# Patient Record
Sex: MALE | Race: WHITE | NOT HISPANIC OR LATINO | Employment: OTHER | URBAN - METROPOLITAN AREA
[De-identification: names, ages, dates, MRNs, and addresses within clinical notes are randomized per-mention and may not be internally consistent; named-entity substitution may affect disease eponyms.]

---

## 2017-08-09 ENCOUNTER — GENERIC CONVERSION - ENCOUNTER (OUTPATIENT)
Dept: OTHER | Facility: OTHER | Age: 72
End: 2017-08-09

## 2017-08-25 ENCOUNTER — ALLSCRIPTS OFFICE VISIT (OUTPATIENT)
Dept: OTHER | Facility: OTHER | Age: 72
End: 2017-08-25

## 2017-11-01 ENCOUNTER — GENERIC CONVERSION - ENCOUNTER (OUTPATIENT)
Dept: OTHER | Facility: OTHER | Age: 72
End: 2017-11-01

## 2017-11-16 ENCOUNTER — GENERIC CONVERSION - ENCOUNTER (OUTPATIENT)
Dept: OTHER | Facility: OTHER | Age: 72
End: 2017-11-16

## 2017-11-22 ENCOUNTER — ALLSCRIPTS OFFICE VISIT (OUTPATIENT)
Dept: OTHER | Facility: OTHER | Age: 72
End: 2017-11-22

## 2017-11-22 DIAGNOSIS — N28.89 OTHER SPECIFIED DISORDERS OF KIDNEY AND URETER: ICD-10-CM

## 2017-11-23 NOTE — PROGRESS NOTES
Assessment    1  Encounter for preventive health examination (V70 0) (Z00 00)   2  Hypertension (401 9) (I10)   3  Hyperlipidemia (272 4) (E78 5)   4  COPD (chronic obstructive pulmonary disease) (496) (J44 9)   5  Paroxysmal supraventricular tachycardia (427 0) (I47 1)   6  Left renal mass (593 9) (N28 89)   7  Colitis (558 9) (K52 9)    Plan  Hypertension    · Eat a low fat and low cholesterol diet ; Status:Complete;   Done: 75EKH6605 11:07AM   · We encourage you to begin to make lifestyle changes to help control your bloodpressure  These may include losing weight, increasing your activity level, limiting salt inyour diet, decreasing alcohol intake, and eating a diet low in fat and rich in fruitsand vegetables ; Status:Complete;   Done: 17FAV8046 11:07AM  Left renal mass    · US KIDNEY AND BLADDER; Status:Hold For - Scheduling; Requested for:22Nov2017;     Discussion/Summary    Finish antibiotics  He has a follow-up with Cardiology including a repeat lipid panel  schedule kidney ultrasound to evaluate lesion left kidney  Possible side effects of new medications were reviewed with the patient/guardian today  The treatment plan was reviewed with the patient/guardian  The patient/guardian understands and agrees with the treatment plan      Chief Complaint    1  Abdominal Pain    History of Present Illness  follow up visit  Patient was seen in ER 11/15/2017 with abdominal pain  workup the emergency room CBC WBC 82507  Hemoglobin 16 2  Platelet count 813990  chemistry profile random blood glucose 93  Electrolytes normal  creatinine 1 06  LFTs normal  Lactate level 1 5  lipase 130  urinalysis trace ketones  CT scan abdomen and pelvis with contrast showed suggestion of mild circumferential diffuse colonic wall thickening  Clinical correlation for infectious / inflammatory colitis suggested  Indeterminate hypodense lesion lower pole left kidney 1 3 cm  No lymphadenopathy  medications reviewed   Patient was discharged on Cipro and Flagyl  He is currently asymptomatic  Last colonoscopy 2 years ago    hypertension stable on Losartan 50 mg daily  hyperlipidemia on Simvastatin 40 mg daily  history of COPD and chronic cough  former smoker  prior pulmonary medicine including pulmonary function tests  chest x-ray and CT scan sinuses  he is currently on [employer] daily  Up-to-date with flu vaccine and pneumococcal vaccines      Review of Systems   Constitutional: recent 10 lb weight loss, but-- as noted in HPI  Eyes: no eyesight problems  ENT: no earache,-- no sore throat,-- no nasal discharge-- and-- no hoarseness  Cardiovascular: cardiomyopathy patient followed by Cardiology  08/2014 cardiac cath normal coronary arteries  11/2017 echocardiogram mildly dilated left ventricle  Mildly reduced left ventricular systolic function  Possible inferoseptal as well as inferolateral wall motion abnormalities  Estimated EF 45% right ventricle normal  No pericardial effusion  No significant valvular disease  stress echocardiogram 12/2013 negative for ischemia  Abnormal septal motion / septal hypokinesis as well as global hypokinesis  EF 40%, but-- no chest pain,-- no palpitations-- and-- no extremity edema  Respiratory: as noted in HPI,-- no orthopnea,-- no wheezing,-- no shortness of breath during exertion-- and-- no PND  Gastrointestinal: colonoscopy < 5 years ago , but-- no abdominal pain,-- no nausea,-- no vomiting,-- no constipation,-- no diarrhea-- and-- no blood in stools  Genitourinary: no dysuria,-- no urinary hesitancy,-- no incontinence-- and-- no nocturia  Musculoskeletal: no arthralgias-- and-- no myalgias  Integumentary: history of basal cell CA left cheek, but-- no rashes-- and-- no skin lesions  Neurological: no headache,-- no dizziness-- and-- no difficulty walking  Psychiatric: no anxiety-- and-- no depression  Endocrine: erectile dysfunction-- and-- ED no improvement with Viagra  , but-- no muscle weakness  Hematologic/Lymphatic: a tendency for easy bruising-- and-- on ASA, but-- no swollen glands  Active Problems  1  Colitis (558 9) (K52 9)   2  COPD (chronic obstructive pulmonary disease) (496) (J44 9)   3  Hyperlipidemia (272 4) (E78 5)   4  Hypertension (401 9) (I10)   5  Left renal mass (593 9) (N28 89)   6  Paroxysmal supraventricular tachycardia (427 0) (I47 1)    Past Medical History  1  History of basal cell carcinoma (V10 83) (Z85 828)   2  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)    Surgical History  1  History of Cervical Vertebral Fusion   2  History of Knee Surgery Left    Family History  Father    1  Family history of malignant neoplasm of stomach (V16 0) (Z80 0)  Sister    2  Family history of Colon cancer    Social History     · Former smoker (H94 70) (Y48 537)    Current Meds   1  Aspirin 81 MG TABS; Therapy: (Recorded:25Mar2015) to Recorded   2  Breo Ellipta 100-25 MCG/INH Inhalation Aerosol Powder Breath Activated; USE 1 INHALATION ONCE DAILY; Therapy: [id number] to (Last Rx:31Jan2017)  Requested for: [id number] Ordered   3  Ciprofloxacin HCl - 500 MG Oral Tablet; TAKE 1 TABLET EVERY 12 HOURS DAILY; Therapy: (Recorded:22Nov2017) to Recorded   4  Losartan Potassium 50 MG Oral Tablet; TAKE ONE (1) TABLET daily; Therapy: (Recorded:25Mar2015) to Recorded   5  Metoprolol Succinate ER 25 MG Oral Tablet Extended Release 24 Hour; One tablet daily; Therapy: (Recorded:25Mar2015) to Recorded   6  MetroNIDAZOLE 500 MG Oral Tablet; 1 po tid; Therapy: (Recorded:22Nov2017) to Recorded   7  Simvastatin 40 MG Oral Tablet; TAKE 1 TABLET BY MOUTH EVERY DAY; Therapy: (Recorded:25Mar2015) to Recorded    Allergies  1  No Known Drug Allergies  2  No Known Environmental Allergies   3   No Known Food Allergies    Vitals  Vital Signs    Recorded: 22Nov2017 08:18AM   Temperature 96 1 F   Heart Rate 78   Respiration 18   Systolic 711   Diastolic 70   BP CUFF SIZE Large   Height 5 ft 7 in   Weight 180 lb 9 6 oz   BMI Calculated 28 29   BSA Calculated 1 94       Physical Exam   Constitutional  General appearance: No acute distress, well appearing and well nourished  Head and Face  Palpation of the face and sinuses: No sinus tenderness  Eyes  Conjunctiva and lids: No erythema, swelling or discharge  -- fundi not seen  Pupils and irises: Equal, round, reactive to light  Ears, Nose, Mouth, and Throat  Otoscopic examination: Tympanic membranes translucent with normal light reflex  Canals patent without erythema  Oropharynx: Normal with no erythema, edema, exudate or lesions  Neck  Neck: Supple, symmetric, trachea midline, no masses  -- no JVD  Thyroid: Normal, no thyromegaly  Pulmonary  Auscultation of lungs: Clear to auscultation  no rales or crackles were heard bilaterally  no wheezing  no diminished breath sounds  Cardiovascular  Auscultation of heart: Normal rate and rhythm, normal S1 and S2, no murmurs  Heart sounds: no gallop heard  Carotid pulses: 2+ bilaterally  no bruit heard over the right carotid-- and-- no bruit heard over the left carotid  Abdominal aorta: Normal   Abdominal aorta: no bruit heard  Examination of extremities for edema and/or varicosities: Normal    Abdomen  Abdomen: Non-tender, no masses  Liver and spleen: No hepatomegaly or splenomegaly  Lymphatic  Palpation of lymph nodes in neck: No lymphadenopathy  no anterior cervical node enlargement,-- no posterior cervical node enlargement,-- no submandibular node enlargement-- and-- no supraclavicular node enlargement  Musculoskeletal  Gait and station: Normal    Inspection/palpation of digits and nails: Normal without clubbing or cyanosis  Range of motion: Normal    Skin  Skin and subcutaneous tissue: Normal without rashes or lesions  Neurologic  Cranial nerves: Cranial nerves 2-12 intact  Psychiatric  Recent and remote memory: Intact     Mood and affect: Normal        Results/Data  ECHO COMPLETE WITH CONTRAST IF INDICATED [id number] 12:00AM      Test Name Result Flag Reference   ECHO COMPLETE WITH CONTRAST IF INDICATED 11/1/2017         Summary / No summary entered :    No summary entered  Documents attached :    Srini Found; Enc: [id number] - Image Encounter - Corky Tovar - (Family    Medicine) (Additional Information Document)  (1) TISSUE EXAM 29Apr2016 11:49AM Bradly Patience     Test Name Result Flag Reference   LAB AP CASE REPORT (Report)       Surgical Pathology Report             Case: [id number]                  Authorizing Provider: Daija Reed MD       Collected:      04/29/2016 1149       Ordering Location:   [facility]    Received:      04/29/2016 Ahmet Lopez [address] Operating Room                           Pathologist:      Ray Patel MD                               Intraop:        Ray Patel MD                               Specimens:  A) - Skin, Other, Left Cheek BCC- Marking suture short superior, long lateral            B) - Skin, Cyst/Tag/Debridement, Left lower lid lesion                        C) - Skin, Other, Right Upper Lid Lesion                               D) - Skin, Other, Right Lower Lid Lesion                               E) - Skin, Other, Right Lateral Lower Lid Lesion                           F) - Skin, Other, New inferior margin- Left Cheek BCC   LAB AP FINAL DIAGNOSIS (Report)       A  Skin, left Cheek BCC- Marking suture short superior, long lateral,  excision: - Basal cell carcinoma (1 1 cm), nodular and infiltrative types, extending  to deep reticular dermis  - No perineural or lymph-vascular invasion identified  - Resection margins (peripheral and deep) are negative for malignancy  - Changes consistent with prior biopsy  B  Skin, left lower lid lesion, shave biopsy: - Acrochordon/skin tag  C  Skin, right upper lid lesion, shave biopsy: - Seborrheic keratosis, pigmented     D  Skin, right lower lid lesion, shave biopsy: - Seborrheic keratosis, pigmented, reticulated and inflamed  E  Skin, right lateral lower lid lesion, shave biopsy: - Unremarkable benign dermis with adnexa and skeletal muscle; no invasive  carcinoma identified  - No epidermis present; multiple levels examined  See Note  F  Skin, new inferior margin- left cheek BCC, re-excision: - No residual basal cell carcinoma; negative for malignancy  -- No perineural or lymph-vascular invasion  Interpretation performed at [facility], [address] [place] [address]  [id number]  LAB AP INTRAOPERATIVE CONSULTATION        FSDXA: Residual basal cell carcinoma  Visualized peripheral and deep  margins negative  Detached tumor fragments at inferior deep margin  Dr Seb Felix notified 4/29/16   LAB AP NOTE (Report)       Initial levels of specimen E revealed only dermis and skeletal muscle with  no epidermis  Despite re-embedding of the tissue and additional levels, no  epidermis is present to evaluate for an epidermal neoplasm  No evidence of  invasive carcinoma is identified  If lesion remains or recurs, additional  biopsy may be considered  A copy of the final report is faxed to Dr Seb Felix  on 5/4/16   LAB AP SURGICAL ADDITIONAL INFORMATION (Report)       These tests were developed and their performance characteristics  determined by [facility] or [facility]  They may not be cleared or approved by the [place]  Food and  Drug Administration  The FDA has determined that such clearance or  approval is not necessary  These tests are used for clinical purposes  They should not be regarded as investigational or for research  This  laboratory has been approved by CLIA 88, designated as a high-complexity  laboratory and is qualified to perform these tests  LAB AP GROSS DESCRIPTION (Report)       A   The specimen is received fresh for frozen section, labeled with the  patient's name and hospital number, and is designated left cheek  BCC-marking suture short superior, long lateral  The specimen consists of  a 2 x 1 6 x 0 5 cm round excision of tan skin with a 1 0 x 0 7 cm pearly  white nodule  A short suture designating superior will be denoted 1200 and  a long suture designating lateral will be denoted 3 o'clock, are present  The specimen is inked as follows: 12-3 o'clock yellow; 3-6 o'clock green;  6-9 o'clock orange; 9-12 o'clock blue  The specimen is sectioned from 12  to 6 o'clock and entirely submitted for frozen section and subsequent  permanents in the following four blocks/cassettes: A1: 12 and 6 o'clock tips, on end, ink side cut first; A2-A4- center of specimen from 12 to 6 o'clock, 2 pieces in A2, A3; one  piece in A4  EMM  B  The specimen is received in formalin, labeled with the patient's name  and hospital number, and is designated left lower lid lesion  The  specimen consists of one tan partially wrinkled, non-hairbearing, skin  papule, which measures 0 3 x 0 2 x 0 1 cm  The margin of resection is  inked blue, and the surface inked red  Entirely submitted  One cassette  C  The specimen is received in formalin, labeled with the patient's name  and hospital number, and is designated right upper lid lesion  The  specimen consists of one tan, wrinkled, non-hairbearing, skin papule  measuring 0 3 x 0 2 x 0 2 centimeters  The margin of resection is inked  blue  Entirely submitted  One cassette  D  The specimen is received in formalin, labeled with the patient's name  and hospital number, and is designated right lower lid lesion  The  specimen consists of one tan, non-hairbearing, portion of skin measuring  0 7 x 0 4 x 0 2 centimeters  The surface exhibits a keratotic, papule  which measures 0 4 by 0 4 by 0 1 centimeters and appears to come within  less than 0 1 centimeters of nearest peripheral margin  The margin of  resection is inked blue, and the surface tips are inked red  Entirely  submitted  One cassette, trisected, and sequentially submitted    E  The specimen is received in formalin, labeled with the patient's name  and hospital number, and is designated right lateral lower lid lesion  The specimen consists of one partially ragged, tan, non-hairbearing,  portion of skin measuring 0 3 x 0 2 x 0 2 centimeters  The surface appears  partially keratotic  The apparent margin of resection is inked blue, and  the apparent surface inked red  Entirely submitted  One cassette  F  The specimen is received in formalin, labeled with the patient's name  and hospital number, and is designated new inferior margin, left cheek  BCC  The specimen consists of one somewhat arrow shaped, nonhairbearing,  portion of skin measuring 0 8 x 0 5 centimeters with attached underlying  soft tissue to a depth of 0 5 centimeters  One end of the specimen appears  partially ragged, and concave, consistent with old margin, and is inked  black  The remaining specimen is inked as follows: 3-6:00 green, 6-9:00  orange, and the 6 o'clock surface tips is inked red  The specimen is  trisected revealing white-tan dense cut surfaces  Entirely submitted  Two  cassettes  1: 6 o'clock end of resection 2: Central portion bisected  Note: The estimated total formalin fixation time based upon information  provided by the submitting clinician and the standard processing schedule  is approximately 72 hours  MAS     (1) CBC/PLT/DIFF [id number] 12:06PM Jessie Baker     Test Name Result Flag Reference   WBC COUNT 10 9         Summary / No summary entered :    No summary entered  Documents attached :    [address] Work - Jessie Olivia;  Enc: [id number] - Image Encounter - Jessie Baker -    (Family Medicine) (Additional Information Document)  Signatures   Electronically signed by : CHRIS Almanza ; Nov 22 2017 11:07AM EST                       (Author)

## 2018-01-13 VITALS
BODY MASS INDEX: 28.35 KG/M2 | HEIGHT: 67 IN | TEMPERATURE: 96.1 F | WEIGHT: 180.6 LBS | HEART RATE: 78 BPM | RESPIRATION RATE: 18 BRPM | DIASTOLIC BLOOD PRESSURE: 70 MMHG | SYSTOLIC BLOOD PRESSURE: 120 MMHG

## 2018-01-13 VITALS — HEIGHT: 67 IN | WEIGHT: 190 LBS | BODY MASS INDEX: 29.82 KG/M2

## 2018-01-18 NOTE — PROGRESS NOTES
Assessment    1  Encounter for preventive health examination (V70 0) (Z00 00)   2  Hypertension (401 9) (I10)   3  Hyperlipidemia (272 4) (E78 5)   4  COPD (chronic obstructive pulmonary disease) (496) (J44 9)   5  Paroxysmal supraventricular tachycardia (427 0) (I47 1)   6  Left renal mass (593 9) (N28 89)   7  Colitis (558 9) (K52 9)    Plan  Left renal mass    · US KIDNEY [facility] BLADDER; Status:Hold For - Scheduling; Requested for:22Nov2017;     Discussion/Summary    Finish antibiotics  He has a follow-up with Cardiology including a repeat lipid panel  schedule kidney ultrasound to evaluate lesion left kidney  Impression: Subsequent Annual Wellness Visit, with preventive exam as well as age and risk appropriate counseling completed  Cardiovascular screening and counseling: screening not indicated, counseling was given on maintaining a healthy diet and counseling was given on maintaining a healthy weight  Diabetes screening and counseling: screening is current  Colorectal cancer screening and counseling: screening is current  Prostate cancer screening and counseling: screening is current and 02/2016 PSA 1 39  Osteoporosis screening and counseling: screening not indicated  Abdominal aortic aneurysm screening and counseling: screening is current  Immunizations: the patient declines the influenza vaccination, influenza vaccine is up to date this year, the patient declines the pneumococcal vaccination, the lifetime pneumococcal vaccine has been completed and the patient declines the Zostavax vaccine  Advice and education were given regarding fall risk reduction, increasing physical activity, nutrition (non-diabetic), skin self-exam, sunscreen use and weight loss  He was referred to cardiology  Patient Discussion: follow-up visit needed in one year  Possible side effects of new medications were reviewed with the patient/guardian today  The treatment plan was reviewed with the patient/guardian  The patient/guardian understands and agrees with the treatment plan      Chief Complaint    1  Abdominal Pain    History of Present Illness  HPI: follow up visit  Patient was seen in ER 11/15/2017 with abdominal pain  workup the emergency room CBC WBC 58130  Hemoglobin 16 2  Platelet count 207881  chemistry profile random blood glucose 93  Electrolytes normal  creatinine 1 06  LFTs normal  Lactate level 1 5  lipase 130  urinalysis trace ketones  CT scan abdomen and pelvis with contrast showed suggestion of mild circumferential diffuse colonic wall thickening  Clinical correlation for infectious / inflammatory colitis suggested  Indeterminate hypodense lesion lower pole left kidney 1 3 cm  No lymphadenopathy  medications reviewed  Patient was discharged on Cipro and Flagyl  He is currently asymptomatic  Last colonoscopy 2 years ago    hypertension stable on Losartan 50 mg daily  hyperlipidemia on Simvastatin 40 mg daily  history of COPD and chronic cough  former smoker  prior pulmonary medicine including pulmonary function tests  chest x-ray and CT scan sinuses  he is currently on [employer] daily  Up-to-date with flu vaccine and pneumococcal vaccines       Welcome to Medicare and Wellness Visits: The patient is being seen for the subsequent annual wellness visit  Medicare Screening and Risk Factors   Hospitalizations: he has been previously hospitalizied  Once per lifetime medicare screening tests: ECG (12/2015) and AAA screening US (04/2013 no AAA  )  Medicare Screening Tests Risk Questions   Abdominal aortic aneurysm risk assessment: tobacco use and over 72years of age  Osteoporosis risk assessment:  and over 48years of age  Drug and Alcohol Use: The patient is a former cigarette smoker  The patient reports rare alcohol use  Diet and Physical Activity: Current diet includes well balanced meals  He exercises infrequently  Exercise: walking     Mood Disorder and Cognitive Impairment Screening: He denies feeling down, depressed, or hopeless over the past two weeks  He denies feeling little interest or pleasure in doing things over the past two weeks  Cognitive impairment screening: denies difficulty learning/retaining new information, denies difficulty handling complex tasks, denies difficulty with reasoning, denies difficulty with spatial ability and orientation, denies difficulty with language and denies difficulty with behavior  Functional Ability/Level of Safety: He denies hearing difficulties  He does not use a hearing aid  The patient is currently able to participate in social activities with limitations and able to drive with limitations, but able to do activities of daily living without limitations and able to do instrumental activities of daily living without limitations  Fall risk factors: The patient fell 0 times in the past 12 months  Injury History: no polypharmacy, no mobility impairment, no antidepressant use, no deconditioning, no postural hypotension, no sedative use, no visual impairment, no urinary incontinence, antihypertensive use, no cognitive impairment and no previous fall  Advance Directives: Advance directives: no living will, no durable power of  for health care directives and no advance directives  Co-Managers and Medical Equipment/Suppliers: See Patient Care Team      Patient Care Team    Care Team Member Role Specialty Office Number   Chantale Porter MD  Cardiology [phone]   Felicita HODGE  Specialist Plastic Surgery [phone]     Review of Systems    Constitutional: recent 10 lb weight loss, but as noted in HPI  Eyes: no eyesight problems  ENT: no earache, no sore throat, no nasal discharge and no hoarseness  Cardiovascular: cardiomyopathy patient followed by Cardiology  08/2014 cardiac cath normal coronary arteries  11/2017 echocardiogram mildly dilated left ventricle  Mildly reduced left ventricular systolic function   Possible inferoseptal as well as inferolateral wall motion abnormalities  Estimated EF 45% right ventricle normal  No pericardial effusion  No significant valvular disease  stress echocardiogram 12/2013 negative for ischemia  Abnormal septal motion / septal hypokinesis as well as global hypokinesis  EF 40%, but no chest pain, no palpitations and no extremity edema  Respiratory: as noted in HPI, no orthopnea, no wheezing, no shortness of breath during exertion and no PND  Gastrointestinal: colonoscopy < 5 years ago , but no abdominal pain, no nausea, no vomiting, no constipation, no diarrhea and no blood in stools  Genitourinary: no dysuria, no urinary hesitancy, no incontinence and no nocturia  Musculoskeletal: no arthralgias and no myalgias  Integumentary: history of basal cell CA left cheek, but no rashes and no skin lesions  Neurological: no headache, no dizziness and no difficulty walking  Psychiatric: no anxiety and no depression  Endocrine: erectile dysfunction and ED no improvement with Viagra  , but no muscle weakness  Hematologic/Lymphatic: a tendency for easy bruising and on ASA, but no swollen glands  Active Problems    1  COPD (chronic obstructive pulmonary disease) (496) (J44 9)   2  Hyperlipidemia (272 4) (E78 5)   3  Hypertension (401 9) (I10)   4  Paroxysmal supraventricular tachycardia (427 0) (I47 1)    Past Medical History    · History of basal cell carcinoma (V10 83) (G66 089)   · 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) (U70 47RF)    Surgical History    · History of Cervical Vertebral Fusion   · History of Knee Surgery Left    Family History  Father    · Family history of malignant neoplasm of stomach (V16 0) (Z80 0)  Sister    · Family history of Colon cancer    The family history was reviewed and updated today  Social History    · Former smoker (Z23 48) (E86 336)    Current Meds   1  Aspirin 81 MG TABS; Therapy: (Recorded:25Mar2015) to Recorded   2   Breo Ellipta 100-25 MCG/INH Inhalation Aerosol Powder Breath Activated; USE 1   INHALATION ONCE DAILY; Therapy: [id number] to (Last Rx:31Jan2017)  Requested for: [id number] Ordered   3  Ciprofloxacin HCl - 500 MG Oral Tablet; TAKE 1 TABLET EVERY 12 HOURS DAILY; Therapy: (Recorded:22Nov2017) to Recorded   4  Losartan Potassium 50 MG Oral Tablet; TAKE ONE (1) TABLET daily; Therapy: (Recorded:25Mar2015) to Recorded   5  Metoprolol Succinate ER 25 MG Oral Tablet Extended Release 24 Hour; One tablet daily; Therapy: (Recorded:25Mar2015) to Recorded   6  MetroNIDAZOLE 500 MG Oral Tablet; 1 po tid; Therapy: (Recorded:22Nov2017) to Recorded   7  Simvastatin 40 MG Oral Tablet; TAKE 1 TABLET BY MOUTH EVERY DAY; Therapy: (Recorded:25Mar2015) to Recorded    Allergies    1  No Known Drug Allergies    2  No Known Environmental Allergies   3  No Known Food Allergies    Vitals  Signs    Temperature: 96 1 F  Heart Rate: 78  Respiration: 18  Systolic: 799  Diastolic: 70  BP Cuff Size: Large  Height: 5 ft 7 in  Weight: 180 lb 9 6 oz  BMI Calculated: 28 29  BSA Calculated: 1 94    Physical Exam    Constitutional   General appearance: No acute distress, well appearing and well nourished  Head and Face   Palpation of the face and sinuses: No sinus tenderness  Eyes   Conjunctiva and lids: No erythema, swelling or discharge  fundi not seen  Pupils and irises: Equal, round, reactive to light  Ears, Nose, Mouth, and Throat   Otoscopic examination: Tympanic membranes translucent with normal light reflex  Canals patent without erythema  Oropharynx: Normal with no erythema, edema, exudate or lesions  Neck   Neck: Supple, symmetric, trachea midline, no masses  no JVD  Thyroid: Normal, no thyromegaly  Pulmonary   Auscultation of lungs: Clear to auscultation  no rales or crackles were heard bilaterally  no wheezing  no diminished breath sounds     Cardiovascular   Auscultation of heart: Normal rate and rhythm, normal S1 and S2, no murmurs  Heart sounds: no gallop heard  Carotid pulses: 2+ bilaterally  no bruit heard over the right carotid and no bruit heard over the left carotid  Abdominal aorta: Normal   Abdominal aorta: no bruit heard  Examination of extremities for edema and/or varicosities: Normal     Abdomen   Abdomen: Non-tender, no masses  Liver and spleen: No hepatomegaly or splenomegaly  Lymphatic   Palpation of lymph nodes in neck: No lymphadenopathy  no anterior cervical node enlargement, no posterior cervical node enlargement, no submandibular node enlargement and no supraclavicular node enlargement  Musculoskeletal   Gait and station: Normal     Inspection/palpation of digits and nails: Normal without clubbing or cyanosis  Range of motion: Normal     Skin   Skin and subcutaneous tissue: Normal without rashes or lesions  Neurologic   Cranial nerves: Cranial nerves 2-12 intact  Psychiatric   Recent and remote memory: Intact  Mood and affect: Normal        Results/Data  ECHO COMPLETE WITH CONTRAST IF INDICATED [id number] 12:00AM      Test Name Result Flag Reference   ECHO COMPLETE WITH CONTRAST IF INDICATED 11/1/2017       Summary / No summary entered :      No summary entered  Documents attached :      [place];  Enc: [id number] - Image Encounter - Atulpatricio ManzoLidya - (Family      Medicine) (Additional Information Document)  (1) TISSUE EXAM 29Apr2016 11:49AM Dori Garcia     Test Name Result Flag Reference   LAB AP CASE REPORT (Report)     Surgical Pathology Report             Case: [id number]                   Authorizing Provider: Paris Vieira MD       Collected:      04/29/2016 1149        Ordering Location:   [facility]    Received:      04/29/2016 [address] Room                            Pathologist:      Emma Zamora MD                                Intraop:        Emma Zamora MD                                Specimens:  A) - Skin, Other, Left Cheek BCC- Marking suture short superior, long lateral             B) - Skin, Cyst/Tag/Debridement, Left lower lid lesion                         C) - Skin, Other, Right Upper Lid Lesion                                D) - Skin, Other, Right Lower Lid Lesion                                E) - Skin, Other, Right Lateral Lower Lid Lesion                            F) - Skin, Other, New inferior margin- Left Cheek BCC   LAB AP FINAL DIAGNOSIS (Report)     A  Skin, left Cheek BCC- Marking suture short superior, long lateral,   excision:  - Basal cell carcinoma (1 1 cm), nodular and infiltrative types, extending   to deep reticular dermis   - No perineural or lymph-vascular invasion identified   - Resection margins (peripheral and deep) are negative for malignancy  - Changes consistent with prior biopsy  B  Skin, left lower lid lesion, shave biopsy:  - Acrochordon/skin tag  C  Skin, right upper lid lesion, shave biopsy:  - Seborrheic keratosis, pigmented  D  Skin, right lower lid lesion, shave biopsy:  - Seborrheic keratosis, pigmented, reticulated and inflamed  E  Skin, right lateral lower lid lesion, shave biopsy:  - Unremarkable benign dermis with adnexa and skeletal muscle; no invasive   carcinoma identified   - No epidermis present; multiple levels examined  See Note  F  Skin, new inferior margin- left cheek BCC, re-excision:  - No residual basal cell carcinoma; negative for malignancy  -- No perineural or lymph-vascular invasion  Interpretation performed at [facility], [address] [place] [address]  LAB AP INTRAOPERATIVE CONSULTATION      FSDXA: Residual basal cell carcinoma  Visualized peripheral and deep   margins negative  Detached tumor fragments at inferior deep margin  Dr René Chaves notified 4/29/16   LAB AP NOTE (Report)     Initial levels of specimen E revealed only dermis and skeletal muscle with   no epidermis   Despite re-embedding of the tissue and additional levels, no   epidermis is present to evaluate for an epidermal neoplasm  No evidence of   invasive carcinoma is identified  If lesion remains or recurs, additional   biopsy may be considered  A copy of the final report is faxed to Dr Thornell Brunner   on 5/4/16   LAB AP SURGICAL ADDITIONAL INFORMATION (Report)     These tests were developed and their performance characteristics   determined by [facility] or [facility]  They may not be cleared or approved by the [place]  Food and   Drug Administration  The FDA has determined that such clearance or   approval is not necessary  These tests are used for clinical purposes  They should not be regarded as investigational or for research  This   laboratory has been approved by [place] [id number], designated as a high-complexity   laboratory and is qualified to perform these tests  LAB AP GROSS DESCRIPTION (Report)     A  The specimen is received fresh for frozen section, labeled with the   patient's name and hospital number, and is designated left cheek   BCC-marking suture short superior, long lateral  The specimen consists of   a 2 x 1 6 x 0 5 cm round excision of tan skin with a 1 0 x 0 7 cm pearly   white nodule  A short suture designating superior will be denoted 1200 and   a long suture designating lateral will be denoted 3 o'clock, are present  The specimen is inked as follows: 12-3 o'clock yellow; 3-6 o'clock green;   6-9 o'clock orange; 9-12 o'clock blue  The specimen is sectioned from 12   to 6 o'clock and entirely submitted for frozen section and subsequent   permanents in the following four blocks/cassettes:  A1: 12 and 6 o'clock tips, on end, ink side cut first;  A2-A4- center of specimen from 12 to 6 o'clock, 2 pieces in A2, A3; one   piece in A4  EMM    B  The specimen is received in formalin, labeled with the patient's name   and hospital number, and is designated left lower lid lesion   The   specimen consists of one tan partially wrinkled, non-hairbearing, skin   papule, which measures 0 3 x 0 2 x 0 1 cm  The margin of resection is   inked blue, and the surface inked red  Entirely submitted  One cassette  C  The specimen is received in formalin, labeled with the patient's name   and hospital number, and is designated right upper lid lesion  The   specimen consists of one tan, wrinkled, non-hairbearing, skin papule   measuring 0 3 x 0 2 x 0 2 centimeters  The margin of resection is inked   blue  Entirely submitted  One cassette  D  The specimen is received in formalin, labeled with the patient's name   and hospital number, and is designated right lower lid lesion  The   specimen consists of one tan, non-hairbearing, portion of skin measuring   0 7 x 0 4 x 0 2 centimeters  The surface exhibits a keratotic, papule   which measures 0 4 by 0 4 by 0 1 centimeters and appears to come within   less than 0 1 centimeters of nearest peripheral margin  The margin of   resection is inked blue, and the surface tips are inked red  Entirely   submitted  One cassette, trisected, and sequentially submitted  E  The specimen is received in formalin, labeled with the patient's name   and hospital number, and is designated right lateral lower lid lesion  The specimen consists of one partially ragged, tan, non-hairbearing,   portion of skin measuring 0 3 x 0 2 x 0 2 centimeters  The surface appears   partially keratotic  The apparent margin of resection is inked blue, and   the apparent surface inked red  Entirely submitted  One cassette  F  The specimen is received in formalin, labeled with the patient's name   and hospital number, and is designated new inferior margin, left cheek   BCC  The specimen consists of one somewhat arrow shaped, nonhairbearing,   portion of skin measuring 0 8 x 0 5 centimeters with attached underlying   soft tissue to a depth of 0 5 centimeters   One end of the specimen appears   partially ragged, and concave, consistent with old margin, and is inked   black  The remaining specimen is inked as follows: 3-6:00 green, 6-9:00   orange, and the 6 o'clock surface tips is inked red  The specimen is   trisected revealing white-tan dense cut surfaces  Entirely submitted  Two   cassettes  1: 6 o'clock end of resection  2: Central portion bisected    Note: The estimated total formalin fixation time based upon information   provided by the submitting clinician and the standard processing schedule   is approximately 72 hours  MAS     (1) CBC/PLT/DIFF [id number] 12:06PM Mraco Cash     Test Name Result Flag Reference   WBC COUNT 10 9       Summary / No summary entered :      No summary entered  Documents attached :      [address] Work - Marco Cash;  Enc: [id number] - Image Encounter - Marco Cash -      (Family Medicine) (Additional Information Document)  Signatures   Electronically signed by : CHRIS Payan ; Nov 22 2017 11:05AM EST                       (Author)

## 2018-01-26 DIAGNOSIS — J44.9 CHRONIC OBSTRUCTIVE PULMONARY DISEASE, UNSPECIFIED COPD TYPE (HCC): Primary | ICD-10-CM

## 2018-01-26 RX ORDER — FLUTICASONE FUROATE AND VILANTEROL TRIFENATATE 100; 25 UG/1; UG/1
POWDER RESPIRATORY (INHALATION)
Qty: 180 EACH | Refills: 3 | Status: SHIPPED | OUTPATIENT
Start: 2018-01-26 | End: 2018-09-17 | Stop reason: SDUPTHER

## 2018-09-17 DIAGNOSIS — J44.9 CHRONIC OBSTRUCTIVE PULMONARY DISEASE, UNSPECIFIED COPD TYPE (HCC): ICD-10-CM

## 2018-09-18 RX ORDER — FLUTICASONE FUROATE AND VILANTEROL 100; 25 UG/1; UG/1
1 POWDER RESPIRATORY (INHALATION) DAILY
Qty: 90 EACH | Refills: 1 | Status: SHIPPED | OUTPATIENT
Start: 2018-09-18 | End: 2019-04-14 | Stop reason: SDUPTHER

## 2019-04-14 DIAGNOSIS — J44.9 CHRONIC OBSTRUCTIVE PULMONARY DISEASE, UNSPECIFIED COPD TYPE (HCC): ICD-10-CM

## 2019-04-15 RX ORDER — FLUTICASONE FUROATE AND VILANTEROL TRIFENATATE 100; 25 UG/1; UG/1
POWDER RESPIRATORY (INHALATION)
Qty: 180 EACH | Refills: 0 | Status: SHIPPED | OUTPATIENT
Start: 2019-04-15 | End: 2019-06-25 | Stop reason: SDUPTHER

## 2019-04-24 ENCOUNTER — OFFICE VISIT (OUTPATIENT)
Dept: FAMILY MEDICINE CLINIC | Facility: CLINIC | Age: 74
End: 2019-04-24
Payer: COMMERCIAL

## 2019-04-24 ENCOUNTER — TELEPHONE (OUTPATIENT)
Dept: FAMILY MEDICINE CLINIC | Facility: CLINIC | Age: 74
End: 2019-04-24

## 2019-04-24 VITALS
BODY MASS INDEX: 26.98 KG/M2 | DIASTOLIC BLOOD PRESSURE: 68 MMHG | OXYGEN SATURATION: 97 % | SYSTOLIC BLOOD PRESSURE: 102 MMHG | HEIGHT: 68 IN | RESPIRATION RATE: 16 BRPM | HEART RATE: 83 BPM | WEIGHT: 178 LBS | TEMPERATURE: 96.8 F

## 2019-04-24 DIAGNOSIS — I10 ESSENTIAL HYPERTENSION: ICD-10-CM

## 2019-04-24 DIAGNOSIS — L81.9 ATYPICAL PIGMENTED LESION: Primary | ICD-10-CM

## 2019-04-24 DIAGNOSIS — E11.65 TYPE 2 DIABETES MELLITUS WITH HYPERGLYCEMIA, WITHOUT LONG-TERM CURRENT USE OF INSULIN (HCC): ICD-10-CM

## 2019-04-24 DIAGNOSIS — E78.2 MIXED HYPERLIPIDEMIA: ICD-10-CM

## 2019-04-24 DIAGNOSIS — I47.1 PAROXYSMAL SUPRAVENTRICULAR TACHYCARDIA (HCC): ICD-10-CM

## 2019-04-24 DIAGNOSIS — J44.9 CHRONIC OBSTRUCTIVE PULMONARY DISEASE, UNSPECIFIED COPD TYPE (HCC): ICD-10-CM

## 2019-04-24 DIAGNOSIS — I50.22 CHF (CONGESTIVE HEART FAILURE), NYHA CLASS I, CHRONIC, SYSTOLIC (HCC): ICD-10-CM

## 2019-04-24 PROBLEM — K52.9 COLITIS: Status: ACTIVE | Noted: 2017-11-22

## 2019-04-24 PROBLEM — K52.9 COLITIS: Status: RESOLVED | Noted: 2017-11-22 | Resolved: 2019-04-24

## 2019-04-24 PROBLEM — N28.89 LEFT RENAL MASS: Status: RESOLVED | Noted: 2017-11-22 | Resolved: 2019-04-24

## 2019-04-24 PROBLEM — N28.89 LEFT RENAL MASS: Status: ACTIVE | Noted: 2017-11-22

## 2019-04-24 LAB — SL AMB POCT HEMOGLOBIN AIC: 10.1 (ref ?–6.5)

## 2019-04-24 PROCEDURE — 1111F DSCHRG MED/CURRENT MED MERGE: CPT | Performed by: FAMILY MEDICINE

## 2019-04-24 PROCEDURE — 11104 PUNCH BX SKIN SINGLE LESION: CPT | Performed by: FAMILY MEDICINE

## 2019-04-24 PROCEDURE — 83036 HEMOGLOBIN GLYCOSYLATED A1C: CPT | Performed by: FAMILY MEDICINE

## 2019-04-29 ENCOUNTER — TELEPHONE (OUTPATIENT)
Dept: FAMILY MEDICINE CLINIC | Facility: CLINIC | Age: 74
End: 2019-04-29

## 2019-04-29 LAB
CLINICAL INFO: NORMAL
PATH REPORT.COMMENTS IMP SPEC: NORMAL
PATH REPORT.FINAL DX SPEC: NORMAL
PROCEDURE TYPE: NORMAL
SPECIMEN SOURCE: NORMAL

## 2019-05-06 PROCEDURE — 88305 TISSUE EXAM BY PATHOLOGIST: CPT | Performed by: PATHOLOGY

## 2019-05-07 PROBLEM — L98.9 BACK SKIN LESION: Status: ACTIVE | Noted: 2019-05-07

## 2019-05-20 ENCOUNTER — OFFICE VISIT (OUTPATIENT)
Dept: LAB | Age: 74
End: 2019-05-20
Payer: COMMERCIAL

## 2019-05-20 ENCOUNTER — APPOINTMENT (OUTPATIENT)
Dept: LAB | Age: 74
End: 2019-05-20
Payer: COMMERCIAL

## 2019-05-20 DIAGNOSIS — IMO0002 SQUAMOUS CELL CARCINOMA: ICD-10-CM

## 2019-05-20 LAB
ALBUMIN SERPL BCP-MCNC: 4.1 G/DL (ref 3.5–5)
ALP SERPL-CCNC: 102 U/L (ref 46–116)
ALT SERPL W P-5'-P-CCNC: 33 U/L (ref 12–78)
ANION GAP SERPL CALCULATED.3IONS-SCNC: 8 MMOL/L (ref 4–13)
AST SERPL W P-5'-P-CCNC: 23 U/L (ref 5–45)
BASOPHILS # BLD AUTO: 0.04 THOUSANDS/ΜL (ref 0–0.1)
BASOPHILS NFR BLD AUTO: 0 % (ref 0–1)
BILIRUB SERPL-MCNC: 0.43 MG/DL (ref 0.2–1)
BUN SERPL-MCNC: 24 MG/DL (ref 5–25)
CALCIUM SERPL-MCNC: 9.3 MG/DL (ref 8.3–10.1)
CHLORIDE SERPL-SCNC: 107 MMOL/L (ref 100–108)
CO2 SERPL-SCNC: 25 MMOL/L (ref 21–32)
CREAT SERPL-MCNC: 1.17 MG/DL (ref 0.6–1.3)
CREAT UR-MCNC: 241 MG/DL
EOSINOPHIL # BLD AUTO: 0.05 THOUSAND/ΜL (ref 0–0.61)
EOSINOPHIL NFR BLD AUTO: 1 % (ref 0–6)
ERYTHROCYTE [DISTWIDTH] IN BLOOD BY AUTOMATED COUNT: 12.8 % (ref 11.6–15.1)
EST. AVERAGE GLUCOSE BLD GHB EST-MCNC: 197 MG/DL
GFR SERPL CREATININE-BSD FRML MDRD: 62 ML/MIN/1.73SQ M
GLUCOSE P FAST SERPL-MCNC: 84 MG/DL (ref 65–99)
HBA1C MFR BLD: 8.5 % (ref 4.2–6.3)
HCT VFR BLD AUTO: 45.4 % (ref 36.5–49.3)
HGB BLD-MCNC: 14.9 G/DL (ref 12–17)
IMM GRANULOCYTES # BLD AUTO: 0.04 THOUSAND/UL (ref 0–0.2)
IMM GRANULOCYTES NFR BLD AUTO: 0 % (ref 0–2)
LYMPHOCYTES # BLD AUTO: 2.74 THOUSANDS/ΜL (ref 0.6–4.47)
LYMPHOCYTES NFR BLD AUTO: 29 % (ref 14–44)
MCH RBC QN AUTO: 29.6 PG (ref 26.8–34.3)
MCHC RBC AUTO-ENTMCNC: 32.8 G/DL (ref 31.4–37.4)
MCV RBC AUTO: 90 FL (ref 82–98)
MICROALBUMIN UR-MCNC: 22.2 MG/L (ref 0–20)
MICROALBUMIN/CREAT 24H UR: 9 MG/G CREATININE (ref 0–30)
MONOCYTES # BLD AUTO: 0.81 THOUSAND/ΜL (ref 0.17–1.22)
MONOCYTES NFR BLD AUTO: 9 % (ref 4–12)
NEUTROPHILS # BLD AUTO: 5.88 THOUSANDS/ΜL (ref 1.85–7.62)
NEUTS SEG NFR BLD AUTO: 61 % (ref 43–75)
NRBC BLD AUTO-RTO: 0 /100 WBCS
PLATELET # BLD AUTO: 329 THOUSANDS/UL (ref 149–390)
PMV BLD AUTO: 9.8 FL (ref 8.9–12.7)
POTASSIUM SERPL-SCNC: 4.2 MMOL/L (ref 3.5–5.3)
PROT SERPL-MCNC: 7.4 G/DL (ref 6.4–8.2)
RBC # BLD AUTO: 5.04 MILLION/UL (ref 3.88–5.62)
SODIUM SERPL-SCNC: 140 MMOL/L (ref 136–145)
TSH SERPL DL<=0.05 MIU/L-ACNC: 1.5 UIU/ML (ref 0.36–3.74)
WBC # BLD AUTO: 9.56 THOUSAND/UL (ref 4.31–10.16)

## 2019-05-20 PROCEDURE — 82043 UR ALBUMIN QUANTITATIVE: CPT | Performed by: FAMILY MEDICINE

## 2019-05-20 PROCEDURE — 36415 COLL VENOUS BLD VENIPUNCTURE: CPT

## 2019-05-20 PROCEDURE — 80053 COMPREHEN METABOLIC PANEL: CPT

## 2019-05-20 PROCEDURE — 84443 ASSAY THYROID STIM HORMONE: CPT | Performed by: FAMILY MEDICINE

## 2019-05-20 PROCEDURE — 85025 COMPLETE CBC W/AUTO DIFF WBC: CPT | Performed by: FAMILY MEDICINE

## 2019-05-20 PROCEDURE — 82570 ASSAY OF URINE CREATININE: CPT | Performed by: FAMILY MEDICINE

## 2019-05-20 PROCEDURE — 93005 ELECTROCARDIOGRAM TRACING: CPT

## 2019-05-20 PROCEDURE — 83036 HEMOGLOBIN GLYCOSYLATED A1C: CPT | Performed by: FAMILY MEDICINE

## 2019-05-21 ENCOUNTER — TELEPHONE (OUTPATIENT)
Dept: FAMILY MEDICINE CLINIC | Facility: CLINIC | Age: 74
End: 2019-05-21

## 2019-05-21 DIAGNOSIS — E11.65 TYPE 2 DIABETES MELLITUS WITH HYPERGLYCEMIA, WITHOUT LONG-TERM CURRENT USE OF INSULIN (HCC): Primary | ICD-10-CM

## 2019-05-21 PROBLEM — IMO0002 SQUAMOUS CELL CARCINOMA: Status: ACTIVE | Noted: 2019-05-21

## 2019-05-21 LAB
ATRIAL RATE: 74 BPM
P AXIS: 66 DEGREES
PR INTERVAL: 162 MS
QRS AXIS: -20 DEGREES
QRSD INTERVAL: 122 MS
QT INTERVAL: 392 MS
QTC INTERVAL: 435 MS
T WAVE AXIS: 3 DEGREES
VENTRICULAR RATE: 74 BPM

## 2019-05-21 PROCEDURE — 93010 ELECTROCARDIOGRAM REPORT: CPT | Performed by: INTERNAL MEDICINE

## 2019-05-30 ENCOUNTER — ANESTHESIA (OUTPATIENT)
Dept: PERIOP | Facility: HOSPITAL | Age: 74
End: 2019-05-30
Payer: COMMERCIAL

## 2019-05-30 ENCOUNTER — HOSPITAL ENCOUNTER (OUTPATIENT)
Facility: HOSPITAL | Age: 74
Setting detail: OUTPATIENT SURGERY
Discharge: HOME | End: 2019-05-30
Attending: SURGERY | Admitting: SURGERY
Payer: COMMERCIAL

## 2019-05-30 ENCOUNTER — ANESTHESIA EVENT (OUTPATIENT)
Dept: PERIOP | Facility: HOSPITAL | Age: 74
End: 2019-05-30
Payer: COMMERCIAL

## 2019-05-30 VITALS
RESPIRATION RATE: 16 BRPM | OXYGEN SATURATION: 95 % | HEIGHT: 68 IN | HEART RATE: 84 BPM | WEIGHT: 185 LBS | SYSTOLIC BLOOD PRESSURE: 130 MMHG | TEMPERATURE: 97 F | DIASTOLIC BLOOD PRESSURE: 66 MMHG | BODY MASS INDEX: 28.04 KG/M2

## 2019-05-30 DIAGNOSIS — IMO0002 SQUAMOUS CELL CARCINOMA: ICD-10-CM

## 2019-05-30 LAB — GLUCOSE SERPL-MCNC: 112 MG/DL (ref 65–140)

## 2019-05-30 PROCEDURE — 82948 REAGENT STRIP/BLOOD GLUCOSE: CPT

## 2019-05-30 PROCEDURE — 13101 CMPLX RPR TRUNK 2.6-7.5 CM: CPT | Performed by: SURGERY

## 2019-05-30 PROCEDURE — 88305 TISSUE EXAM BY PATHOLOGIST: CPT | Performed by: PATHOLOGY

## 2019-05-30 PROCEDURE — 11606 EXC TR-EXT MAL+MARG >4 CM: CPT | Performed by: SURGERY

## 2019-05-30 RX ORDER — MAGNESIUM HYDROXIDE 1200 MG/15ML
LIQUID ORAL AS NEEDED
Status: DISCONTINUED | OUTPATIENT
Start: 2019-05-30 | End: 2019-05-30 | Stop reason: HOSPADM

## 2019-05-30 RX ORDER — EPHEDRINE SULFATE 50 MG/ML
INJECTION INTRAVENOUS AS NEEDED
Status: DISCONTINUED | OUTPATIENT
Start: 2019-05-30 | End: 2019-05-30 | Stop reason: SURG

## 2019-05-30 RX ORDER — ONDANSETRON 2 MG/ML
4 INJECTION INTRAMUSCULAR; INTRAVENOUS ONCE AS NEEDED
Status: DISCONTINUED | OUTPATIENT
Start: 2019-05-30 | End: 2019-05-30 | Stop reason: HOSPADM

## 2019-05-30 RX ORDER — PROPOFOL 10 MG/ML
INJECTION, EMULSION INTRAVENOUS CONTINUOUS PRN
Status: DISCONTINUED | OUTPATIENT
Start: 2019-05-30 | End: 2019-05-30 | Stop reason: SURG

## 2019-05-30 RX ORDER — MIDAZOLAM HYDROCHLORIDE 1 MG/ML
INJECTION INTRAMUSCULAR; INTRAVENOUS AS NEEDED
Status: DISCONTINUED | OUTPATIENT
Start: 2019-05-30 | End: 2019-05-30 | Stop reason: SURG

## 2019-05-30 RX ORDER — KETAMINE HYDROCHLORIDE 100 MG/ML
INJECTION, SOLUTION INTRAMUSCULAR; INTRAVENOUS AS NEEDED
Status: DISCONTINUED | OUTPATIENT
Start: 2019-05-30 | End: 2019-05-30 | Stop reason: SURG

## 2019-05-30 RX ORDER — ONDANSETRON 2 MG/ML
INJECTION INTRAMUSCULAR; INTRAVENOUS AS NEEDED
Status: DISCONTINUED | OUTPATIENT
Start: 2019-05-30 | End: 2019-05-30 | Stop reason: SURG

## 2019-05-30 RX ORDER — FENTANYL CITRATE/PF 50 MCG/ML
50 SYRINGE (ML) INJECTION
Status: DISCONTINUED | OUTPATIENT
Start: 2019-05-30 | End: 2019-05-30 | Stop reason: HOSPADM

## 2019-05-30 RX ORDER — LIDOCAINE HYDROCHLORIDE 10 MG/ML
INJECTION, SOLUTION INFILTRATION; PERINEURAL AS NEEDED
Status: DISCONTINUED | OUTPATIENT
Start: 2019-05-30 | End: 2019-05-30 | Stop reason: SURG

## 2019-05-30 RX ORDER — SODIUM CHLORIDE, SODIUM LACTATE, POTASSIUM CHLORIDE, CALCIUM CHLORIDE 600; 310; 30; 20 MG/100ML; MG/100ML; MG/100ML; MG/100ML
125 INJECTION, SOLUTION INTRAVENOUS CONTINUOUS
Status: DISCONTINUED | OUTPATIENT
Start: 2019-05-30 | End: 2019-05-30 | Stop reason: HOSPADM

## 2019-05-30 RX ORDER — CEFAZOLIN SODIUM 2 G/50ML
2000 SOLUTION INTRAVENOUS EVERY 8 HOURS
Status: DISCONTINUED | OUTPATIENT
Start: 2019-05-30 | End: 2019-05-30 | Stop reason: HOSPADM

## 2019-05-30 RX ORDER — OXYCODONE HYDROCHLORIDE AND ACETAMINOPHEN 5; 325 MG/1; MG/1
1 TABLET ORAL EVERY 4 HOURS PRN
Status: DISCONTINUED | OUTPATIENT
Start: 2019-05-30 | End: 2019-05-30 | Stop reason: HOSPADM

## 2019-05-30 RX ORDER — PROPOFOL 10 MG/ML
INJECTION, EMULSION INTRAVENOUS AS NEEDED
Status: DISCONTINUED | OUTPATIENT
Start: 2019-05-30 | End: 2019-05-30 | Stop reason: SURG

## 2019-05-30 RX ORDER — BUPIVACAINE HYDROCHLORIDE AND EPINEPHRINE 2.5; 5 MG/ML; UG/ML
INJECTION, SOLUTION EPIDURAL; INFILTRATION; INTRACAUDAL; PERINEURAL AS NEEDED
Status: DISCONTINUED | OUTPATIENT
Start: 2019-05-30 | End: 2019-05-30 | Stop reason: HOSPADM

## 2019-05-30 RX ORDER — OXYCODONE HYDROCHLORIDE AND ACETAMINOPHEN 5; 325 MG/1; MG/1
2 TABLET ORAL EVERY 4 HOURS PRN
Status: DISCONTINUED | OUTPATIENT
Start: 2019-05-30 | End: 2019-05-30 | Stop reason: HOSPADM

## 2019-05-30 RX ADMIN — OXYCODONE AND ACETAMINOPHEN 2 TABLET: 5; 325 TABLET ORAL at 16:44

## 2019-05-30 RX ADMIN — CEFAZOLIN SODIUM 2000 MG: 2 SOLUTION INTRAVENOUS at 15:24

## 2019-05-30 RX ADMIN — KETAMINE HYDROCHLORIDE 20 MG: 100 INJECTION INTRAMUSCULAR; INTRAVENOUS at 15:42

## 2019-05-30 RX ADMIN — PROPOFOL 50 MG: 10 INJECTION, EMULSION INTRAVENOUS at 15:29

## 2019-05-30 RX ADMIN — LIDOCAINE HYDROCHLORIDE ANHYDROUS 50 MG: 10 INJECTION, SOLUTION INFILTRATION at 15:29

## 2019-05-30 RX ADMIN — ONDANSETRON 4 MG: 2 INJECTION INTRAMUSCULAR; INTRAVENOUS at 15:42

## 2019-05-30 RX ADMIN — MIDAZOLAM HYDROCHLORIDE 2 MG: 1 INJECTION, SOLUTION INTRAMUSCULAR; INTRAVENOUS at 15:22

## 2019-05-30 RX ADMIN — EPHEDRINE SULFATE 5 MG: 50 INJECTION, SOLUTION INTRAVENOUS at 15:46

## 2019-05-30 RX ADMIN — SODIUM CHLORIDE, SODIUM LACTATE, POTASSIUM CHLORIDE, AND CALCIUM CHLORIDE: .6; .31; .03; .02 INJECTION, SOLUTION INTRAVENOUS at 14:19

## 2019-05-30 RX ADMIN — PROPOFOL 110 MCG/KG/MIN: 10 INJECTION, EMULSION INTRAVENOUS at 15:29

## 2019-06-11 PROBLEM — IMO0002 SQUAMOUS CELL CARCINOMA: Status: RESOLVED | Noted: 2019-05-21 | Resolved: 2019-06-11

## 2019-06-11 PROBLEM — Z48.89 POSTOPERATIVE VISIT: Status: ACTIVE | Noted: 2019-06-11

## 2019-06-25 DIAGNOSIS — J44.9 CHRONIC OBSTRUCTIVE PULMONARY DISEASE, UNSPECIFIED COPD TYPE (HCC): ICD-10-CM

## 2019-06-26 RX ORDER — FLUTICASONE FUROATE AND VILANTEROL TRIFENATATE 100; 25 UG/1; UG/1
POWDER RESPIRATORY (INHALATION)
Qty: 180 EACH | Refills: 0 | Status: SHIPPED | OUTPATIENT
Start: 2019-06-26 | End: 2019-08-21 | Stop reason: SDUPTHER

## 2019-08-21 ENCOUNTER — OFFICE VISIT (OUTPATIENT)
Dept: FAMILY MEDICINE CLINIC | Facility: CLINIC | Age: 74
End: 2019-08-21
Payer: COMMERCIAL

## 2019-08-21 VITALS
RESPIRATION RATE: 16 BRPM | SYSTOLIC BLOOD PRESSURE: 112 MMHG | TEMPERATURE: 96.7 F | BODY MASS INDEX: 26.36 KG/M2 | HEART RATE: 88 BPM | WEIGHT: 178 LBS | HEIGHT: 69 IN | DIASTOLIC BLOOD PRESSURE: 60 MMHG

## 2019-08-21 DIAGNOSIS — Z00.00 MEDICARE ANNUAL WELLNESS VISIT, SUBSEQUENT: ICD-10-CM

## 2019-08-21 DIAGNOSIS — Z23 NEED FOR TD VACCINE: ICD-10-CM

## 2019-08-21 DIAGNOSIS — I42.8 OTHER CARDIOMYOPATHY (HCC): ICD-10-CM

## 2019-08-21 DIAGNOSIS — J44.9 CHRONIC OBSTRUCTIVE PULMONARY DISEASE, UNSPECIFIED COPD TYPE (HCC): ICD-10-CM

## 2019-08-21 DIAGNOSIS — R80.9 TYPE 2 DIABETES MELLITUS WITH MICROALBUMINURIA, WITHOUT LONG-TERM CURRENT USE OF INSULIN (HCC): Primary | ICD-10-CM

## 2019-08-21 DIAGNOSIS — E11.29 TYPE 2 DIABETES MELLITUS WITH MICROALBUMINURIA, WITHOUT LONG-TERM CURRENT USE OF INSULIN (HCC): Primary | ICD-10-CM

## 2019-08-21 DIAGNOSIS — I10 ESSENTIAL HYPERTENSION: ICD-10-CM

## 2019-08-21 DIAGNOSIS — E78.2 MIXED HYPERLIPIDEMIA: ICD-10-CM

## 2019-08-21 DIAGNOSIS — L98.9 SKIN LESION: ICD-10-CM

## 2019-08-21 PROBLEM — Z48.89 POSTOPERATIVE VISIT: Status: RESOLVED | Noted: 2019-06-11 | Resolved: 2019-08-21

## 2019-08-21 LAB — SL AMB POCT HEMOGLOBIN AIC: 6.5 (ref ?–6.5)

## 2019-08-21 PROCEDURE — 1125F AMNT PAIN NOTED PAIN PRSNT: CPT | Performed by: FAMILY MEDICINE

## 2019-08-21 PROCEDURE — 90714 TD VACC NO PRESV 7 YRS+ IM: CPT | Performed by: FAMILY MEDICINE

## 2019-08-21 PROCEDURE — 11104 PUNCH BX SKIN SINGLE LESION: CPT | Performed by: FAMILY MEDICINE

## 2019-08-21 PROCEDURE — 99214 OFFICE O/P EST MOD 30 MIN: CPT | Performed by: FAMILY MEDICINE

## 2019-08-21 PROCEDURE — 90471 IMMUNIZATION ADMIN: CPT | Performed by: FAMILY MEDICINE

## 2019-08-21 PROCEDURE — G0439 PPPS, SUBSEQ VISIT: HCPCS | Performed by: FAMILY MEDICINE

## 2019-08-21 PROCEDURE — 3074F SYST BP LT 130 MM HG: CPT | Performed by: FAMILY MEDICINE

## 2019-08-21 PROCEDURE — 3008F BODY MASS INDEX DOCD: CPT | Performed by: FAMILY MEDICINE

## 2019-08-21 PROCEDURE — 1036F TOBACCO NON-USER: CPT | Performed by: FAMILY MEDICINE

## 2019-08-21 PROCEDURE — 83036 HEMOGLOBIN GLYCOSYLATED A1C: CPT | Performed by: FAMILY MEDICINE

## 2019-08-21 PROCEDURE — 1160F RVW MEDS BY RX/DR IN RCRD: CPT | Performed by: FAMILY MEDICINE

## 2019-08-21 PROCEDURE — 88305 TISSUE EXAM BY PATHOLOGIST: CPT | Performed by: PATHOLOGY

## 2019-08-21 PROCEDURE — 3044F HG A1C LEVEL LT 7.0%: CPT | Performed by: FAMILY MEDICINE

## 2019-08-21 PROCEDURE — 1170F FXNL STATUS ASSESSED: CPT | Performed by: FAMILY MEDICINE

## 2019-08-21 RX ORDER — SIMVASTATIN 40 MG
40 TABLET ORAL
Qty: 90 TABLET | Refills: 3 | Status: SHIPPED | OUTPATIENT
Start: 2019-08-21 | End: 2022-05-31

## 2019-08-21 RX ORDER — METOPROLOL SUCCINATE 25 MG/1
25 TABLET, EXTENDED RELEASE ORAL DAILY
Qty: 90 TABLET | Refills: 3 | Status: SHIPPED | OUTPATIENT
Start: 2019-08-21

## 2019-08-21 RX ORDER — FLUTICASONE FUROATE AND VILANTEROL 100; 25 UG/1; UG/1
POWDER RESPIRATORY (INHALATION)
Qty: 3 INHALER | Refills: 3 | Status: SHIPPED | OUTPATIENT
Start: 2019-08-21 | End: 2020-07-03

## 2019-08-21 RX ORDER — LOSARTAN POTASSIUM 50 MG/1
50 TABLET ORAL DAILY
Qty: 90 TABLET | Refills: 3 | Status: SHIPPED | OUTPATIENT
Start: 2019-08-21 | End: 2021-11-09 | Stop reason: ALTCHOICE

## 2019-08-21 NOTE — PROGRESS NOTES
Assessment/Plan:         Diagnoses and all orders for this visit:    Type 2 diabetes mellitus with microalbuminuria, without long-term current use of insulin (HCC)  -     POCT hemoglobin A1c  -     metFORMIN (GLUCOPHAGE) 1000 MG tablet; Take 1 tablet (1,000 mg total) by mouth 2 (two) times a day with meals for 92 days  -     Hemoglobin A1C  -     Microalbumin / creatinine urine ratio    Essential hypertension  -     losartan (COZAAR) 50 mg tablet; Take 1 tablet (50 mg total) by mouth daily for 90 days  -     metoprolol succinate (TOPROL-XL) 25 mg 24 hr tablet; Take 1 tablet (25 mg total) by mouth daily  -     CBC and differential  -     Comprehensive metabolic panel    Mixed hyperlipidemia  -     simvastatin (ZOCOR) 40 mg tablet; Take 1 tablet (40 mg total) by mouth daily at bedtime for 90 days  -     Lipid panel  -     TSH, 3rd generation with Free T4 reflex    Other cardiomyopathy (HCC)    Chronic obstructive pulmonary disease, unspecified COPD type ([facility] [address] )  -     fluticasone-vilanterol (BREO ELLIPTA) 100-25 mcg/inh inhaler; ONE PUFF DAILY    Skin lesion  -     Tissue Exam  -     Biopsy    Medicare annual wellness visit, subsequent    Need for Td vaccine  -     TD VACCINE GREATER THAN OR EQUAL TO 6YO PRESERVATIVE FREE IM    Other orders  -     Cancel: Ambulatory referral to Gastroenterology; Future        Continue with current medications  OV 6 months with repeat labs at that time  Flu vaccine in the fall  As a separate procedure today I performed 2 mm punch biopsy of lesion left temple  See procedure note  Patient ID: Willa Montalvo is a 69 y o  male  Follow-up visit  Medications reviewed  Patient was seen 04/2019 with newly diagnosed with type 2 DM  A1c at that time 10 1  He was started in Metformin 500 mg BID  Repeat A1c 05/2019 8 5  His dose of Metformin was increased to 1,000 mg BID  In office A1c today 6 5  05/2019 Urine micro albumin 22 2  On ARB  No hypoglycemic events  No DPN  Eye exam 09/2018  Hypertension blood pressures have been stable on Losartan 50 mg daily and Metoprolol ER 25 mg daily  05/2019 Creatinine 1 17  Electrolytes normal  Hgb 14 9  Hyperlipidemia on Simvastatin 40 mg daily  11/2018  Lipid profile cholesterol 160  Triglycerides 155  HDL 41  LDL 88  05/2019 LFTs normal        The following portions of the patient's history were reviewed and updated as appropriate: allergies, current medications, past family history, past medical history, past social history, past surgical history and problem list     Review of Systems   Constitutional: Negative for appetite change, chills, fatigue, fever and unexpected weight change  7 lb weight loss from May 2019 with dieting   HENT: Negative for congestion, ear pain, hearing loss, rhinorrhea, sore throat, trouble swallowing and voice change  Eyes: Negative for visual disturbance  Respiratory: Positive for shortness of breath  Negative for cough and wheezing  Ex-smoker  History of COPD on Breo daily  Exertional dyspnea no changes  No orthopnea or PND   Cardiovascular: Negative for chest pain, palpitations and leg swelling  History of nonischemic cardiomyopathy followed by cardiology  08/2014 cardiac catheterization normal   11/2018 echocardiogram overall normal left ventricular systolic function  EF 55%  Normal right ventricle  Aortic sclerosis  Mild diastolic dysfunction  11/2017 echocardiogram mildly dilated left ventricle  Mildly reduced left ventricular systolic function  Possible inferoseptal as well as inferolateral wall motion abnormalities  Estimated EF 45%  Right ventricle normal   No significant valvular disease  12/2013 stress echocardiogram negative for ischemia  Abnormal septal motion/septal hypokinesis as well as global hypokinesis  EF 40%  Gastrointestinal: Negative for abdominal pain, blood in stool, constipation, diarrhea, nausea and vomiting          11/2017 ER visit for abdominal pain CT scan abdomen pelvis with contrast showed suggestion of mild circumferential diffuse colonic wall thickening  Indeterminate hypodense lesion lower pole left kidney 1 3 cm  No lymphadenopathy  12/2017 renal ultrasound bilateral renal cyst   Abnormality seen on CT scan corresponds to cyst with single thin septation   Endocrine: Negative for polydipsia and polyuria  Genitourinary: Negative for difficulty urinating  Musculoskeletal: Negative for arthralgias and myalgias  Skin: Negative for rash  Check non healing lesion left temple  Status post resection BCC left cheek and left neck  05/2019 s/p resection SCC in situ left lower back  Allergic/Immunologic: Negative for environmental allergies  Neurological: Negative for dizziness and headaches  Hematological: Negative for adenopathy  Does not bruise/bleed easily  Psychiatric/Behavioral: Negative for dysphoric mood and sleep disturbance  Objective:      /60   Pulse 88   Temp (!) 96 7 °F (35 9 °C)   Resp 16   Ht 5' 9" (1 753 m)   Wt 80 7 kg (178 lb)   BMI 26 29 kg/m²          Physical Exam   Constitutional: He is oriented to person, place, and time  He appears well-developed and well-nourished  No distress  HENT:   Right Ear: Tympanic membrane normal    Left Ear: Tympanic membrane normal    Mouth/Throat: Oropharynx is clear and moist    Eyes: Pupils are equal, round, and reactive to light  Conjunctivae and EOM are normal  No scleral icterus  Neck: No JVD present  Carotid bruit is not present  No tracheal deviation present  No thyroid mass and no thyromegaly present  Cardiovascular: Normal rate, regular rhythm and normal heart sounds  Exam reveals no gallop  Pulses are no weak pulses  No murmur heard  Pulses:       Carotid pulses are 2+ on the right side, and 2+ on the left side  Dorsalis pedis pulses are 2+ on the right side, and 2+ on the left side          Posterior tibial pulses are 2+ on the right side, and 2+ on the left side  Pulmonary/Chest: Effort normal and breath sounds normal  No respiratory distress  He has no wheezes  He has no rales  Abdominal: Soft  Bowel sounds are normal  He exhibits no distension, no abdominal bruit and no mass  There is no hepatosplenomegaly  There is no tenderness  There is no rebound and no guarding  Musculoskeletal: Normal range of motion  He exhibits no edema  Feet:   Right Foot:   Skin Integrity: Negative for ulcer, skin breakdown, erythema, warmth, callus or dry skin  Left Foot:   Skin Integrity: Negative for ulcer, skin breakdown, erythema, warmth, callus or dry skin  Lymphadenopathy:     He has no cervical adenopathy  Neurological: He is alert and oriented to person, place, and time  No cranial nerve deficit  Skin: No rash noted  No cyanosis  Nails show no clubbing  1 cm raised crusted lesion with overlying scab left temple area  Psychiatric: He has a normal mood and affect  Nursing note and vitals reviewed  Patient's shoes and socks removed  Right Foot/Ankle   Right Foot Inspection  Skin Exam: skin normal and skin intact no dry skin, no warmth, no callus, no erythema, no maceration, no abnormal color, no pre-ulcer, no ulcer and no callus                          Toe Exam: ROM and strength within normal limits  Sensory       Monofilament testing: intact  Vascular  Capillary refills: < 3 seconds  The right DP pulse is 2+  The right PT pulse is 2+  Left Foot/Ankle  Left Foot Inspection  Skin Exam: skin normal and skin intactno dry skin, no warmth, no erythema, no maceration, normal color, no pre-ulcer, no ulcer and no callus                         Toe Exam: ROM and strength within normal limits                   Sensory       Monofilament: intact  Vascular  Capillary refills: < 3 seconds  The left DP pulse is 2+  The left PT pulse is 2+  Assign Risk Category:  No deformity present; No loss of protective sensation;  No weak pulses       Risk: 0      Biopsy  Date/Time: 8/21/2019 4:21 PM  Performed by: Timothy Chi MD  Authorized by: Timothy Chi MD     Procedure Details - Skin Biopsy:     Biopsy tissue type: skin and subcutaneous    Biopsy method: punch biopsy      Body area:  Head/neck    Head/neck location:  Scalp (left temple area)    Initial size (mm):  2    Final defect size (mm):  2    Malignancy: benign lesion       Using aseptic technique I performed a 2 mm punch biopsy on non healing lesion left temple area  Pressure dressing applied  Wound care instructions reviewed   Specimen sent for pathology

## 2019-08-21 NOTE — PATIENT INSTRUCTIONS
Obesity   AMBULATORY CARE:   Obesity  is when your body mass index (BMI) is greater than 30  Your healthcare provider will use your height and weight to measure your BMI  The risks of obesity include  many health problems, such as injuries or physical disability  You may need tests to check for the following:  · Diabetes     · High blood pressure or high cholesterol     · Heart disease     · Gallbladder or liver disease     · Cancer of the colon, breast, prostate, liver, or kidney     · Sleep apnea     · Arthritis or gout  Seek care immediately if:   · You have a severe headache, confusion, or difficulty speaking  · You have weakness on one side of your body  · You have chest pain, sweating, or shortness of breath  Contact your healthcare provider if:   · You have symptoms of gallbladder or liver disease, such as pain in your upper abdomen  · You have knee or hip pain and discomfort while walking  · You have symptoms of diabetes, such as intense hunger and thirst, and frequent urination  · You have symptoms of sleep apnea, such as snoring or daytime sleepiness  · You have questions or concerns about your condition or care  Treatment for obesity  focuses on helping you lose weight to improve your health  Even a small decrease in BMI can reduce the risk for many health problems  Your healthcare provider will help you set a weight-loss goal   · Lifestyle changes  are the first step in treating obesity  These include making healthy food choices and getting regular physical activity  Your healthcare provider may suggest a weight-loss program that involves coaching, education, and therapy  · Medicine  may help you lose weight when it is used with a healthy diet and physical activity  · Surgery  can help you lose weight if you are very obese and have other health problems  There are several types of weight-loss surgery  Ask your healthcare provider for more information    Be successful losing weight:   · Set small, realistic goals  An example of a small goal is to walk for 20 minutes 5 days a week  Anther goal is to lose 5% of your body weight  · Tell friends, family members, and coworkers about your goals  and ask for their support  Ask a friend to lose weight with you, or join a weight-loss support group  · Identify foods or triggers that may cause you to overeat , and find ways to avoid them  Remove tempting high-calorie foods from your home and workplace  Place a bowl of fresh fruit on your kitchen counter  If stress causes you to eat, then find other ways to cope with stress  · Keep a diary to track what you eat and drink  Also write down how many minutes of physical activity you do each day  Weigh yourself once a week and record it in your diary  Eating changes: You will need to eat 500 to 1,000 fewer calories each day than you currently eat to lose 1 to 2 pounds a week  The following changes will help you cut calories:  · Eat smaller portions  Use small plates, no larger than 9 inches in diameter  Fill your plate half full of fruits and vegetables  Measure your food using measuring cups until you know what a serving size looks like  · Eat 3 meals and 1 or 2 snacks each day  Plan your meals in advance  Clora Giles and eat at home most of the time  Eat slowly  · Eat fruits and vegetables at every meal   They are low in calories and high in fiber, which makes you feel full  Do not add butter, margarine, or cream sauce to vegetables  Use herbs to season steamed vegetables  · Eat less fat and fewer fried foods  Eat more baked or grilled chicken and fish  These protein sources are lower in calories and fat than red meat  Limit fast food  Dress your salads with olive oil and vinegar instead of bottled dressing  · Limit the amount of sugar you eat  Do not drink sugary beverages  Limit alcohol  Activity changes:  Physical activity is good for your body in many ways   It helps you burn calories and build strong muscles  It decreases stress and depression, and improves your mood  It can also help you sleep better  Talk to your healthcare provider before you begin an exercise program   · Exercise for at least 30 minutes 5 days a week  Start slowly  Set aside time each day for physical activity that you enjoy and that is convenient for you  It is best to do both weight training and an activity that increases your heart rate, such as walking, bicycling, or swimming  · Find ways to be more active  Do yard work and housecleaning  Walk up the stairs instead of using elevators  Spend your leisure time going to events that require walking, such as outdoor festivals or fairs  This extra physical activity can help you lose weight and keep it off  Follow up with your healthcare provider as directed: You may need to meet with a dietitian  Write down your questions so you remember to ask them during your visits  © 2017 [address] [employer] Information is for End User's use only and may not be sold, redistributed or otherwise used for commercial purposes  All illustrations and images included in CareNotes® are the copyrighted property of [employer]  or Livan Soto  The above information is an  only  It is not intended as medical advice for individual conditions or treatments  Talk to your doctor, nurse or pharmacist before following any medical regimen to see if it is safe and effective for you  Urinary Incontinence   WHAT YOU NEED TO KNOW:   What is urinary incontinence? Urinary incontinence (UI) is when you lose control of your bladder  What causes UI? UI occurs because your bladder cannot store or empty urine properly  The following are the most common types of UI:  · Stress incontinence  is when you leak urine due to increased bladder pressure  This may happen when you cough, sneeze, or exercise       · Urge incontinence  is when you feel the need to urinate right away and leak urine accidentally  · Mixed incontinence  is when you have both stress and urge UI  What are the signs and symptoms of UI?   · You feel like your bladder does not empty completely when you urinate  · You urinate often and need to urinate immediately  · You leak urine when you sleep, or you wake up with the urge to urinate  · You leak urine when you cough, sneeze, exercise, or laugh  How is UI diagnosed? Your healthcare provider will ask how often you leak urine and whether you have stress or urge symptoms  Tell him which medicines you take, how often you urinate, and how much liquid you drink each day  You may need any of the following tests:  · Urine tests  may show infection or kidney function  · A pelvic exam  may be done to check for blockages  A pelvic exam will also show if your bladder, uterus, or other organs have moved out of place  · An x-ray, ultrasound, or CT  may show problems with parts of your urinary system  You may be given contrast liquid to help your organs show up better in the pictures  Tell the healthcare provider if you have ever had an allergic reaction to contrast liquid  Do not enter the MRI room with anything metal  Metal can cause serious injury  Tell the healthcare provider if you have any metal in or on your body  · A bladder scan  will show how much urine is left in your bladder after you urinate  You will be asked to urinate and then healthcare providers will use a small ultrasound machine to check the urine left in your bladder  · Cystometry  is used to check the function of your urinary system  Your healthcare provider checks the pressure in your bladder while filling it with fluid  Your bladder pressure may also be tested when your bladder is full and while you urinate  How is UI treated? · Medicines  can help strengthen your bladder control      · Electrical stimulation  is used to send a small amount of electrical energy to your pelvic floor muscles  This helps control your bladder function  Electrodes may be placed outside your body or in your rectum  For women, the electrodes may be placed in the vagina  · A bulking agent  may be injected into the wall of your urethra to make it thicker  This helps keep your urethra closed and decreases urine leakage  · Devices  such as a clamp, pessary, or tampon may help stop urine leaks  Ask your healthcare provider for more information about these and other devices  · Surgery  may be needed if other treatments do not work  Several types of surgery can help improve your bladder control  Ask your healthcare provider for more information about the surgery you may need  How can I manage my symptoms? · Do pelvic muscle exercises often  Your pelvic muscles help you stop urinating  Squeeze these muscles tight for 5 seconds, then relax for 5 seconds  Gradually work up to squeezing for 10 seconds  Do 3 sets of 15 repetitions a day, or as directed  This will help strengthen your pelvic muscles and improve bladder control  · A catheter  may be used to help empty your bladder  A catheter is a tiny, plastic tube that is put into your bladder to drain your urine  Your healthcare provider may tell you to use a catheter to prevent your bladder from getting too full and leaking urine  · Keep a UI record  Write down how often you leak urine and how much you leak  Make a note of what you were doing when you leaked urine  · Train your bladder  Go to the bathroom at set times, such as every 2 hours, even if you do not feel the urge to go  You can also try to hold your urine when you feel the urge to go  For example, hold your urine for 5 minutes when you feel the urge to go  As that becomes easier, hold your urine for 10 minutes  · Drink liquids as directed  Ask your healthcare provider how much liquid to drink each day and which liquids are best for you   You may need to limit the amount of liquid you drink to help control your urine leakage  Limit or do not have drinks that contain caffeine or alcohol  Do not drink any liquid right before you go to bed  · Prevent constipation  Eat a variety of high-fiber foods  Good examples are high-fiber cereals, beans, vegetables, and whole-grain breads  Prune juice may help make your bowel movement softer  Walking is the best way to trigger your intestines to have a bowel movement  · Exercise regularly and maintain a healthy weight  Ask your healthcare provider how much you should weigh and about the best exercise plan for you  Weight loss and exercise will decrease pressure on your bladder and help you control your leakage  Ask him to help you create a weight loss plan if you are overweight  When should I seek immediate care? · You have severe pain  · You are confused or cannot think clearly  When should I contact my healthcare provider? · You have a fever  · You see blood in your urine  · You have pain when you urinate  · You have new or worse pain, even after treatment  · Your mouth feels dry or you have vision changes  · Your urine is cloudy or smells bad  · You have questions or concerns about your condition or care  CARE AGREEMENT:   You have the right to help plan your care  Learn about your health condition and how it may be treated  Discuss treatment options with your caregivers to decide what care you want to receive  You always have the right to refuse treatment  The above information is an  only  It is not intended as medical advice for individual conditions or treatments  Talk to your doctor, nurse or pharmacist before following any medical regimen to see if it is safe and effective for you  © 2017 [address] [employer] Information is for End User's use only and may not be sold, redistributed or otherwise used for commercial purposes   All illustrations and images included in CareNotes® are the copyrighted property of [employer]  or iLvan Soto  Cigarette Smoking and Your Health   AMBULATORY CARE:   Risks to your health if you smoke:  Nicotine and other chemicals found in tobacco damage every cell in your body  Even if you are a light smoker, you have an increased risk for cancer, heart disease, and lung disease  If you are pregnant or have diabetes, smoking increases your risk for complications  Benefits to your health if you stop smoking:   · You decrease respiratory symptoms such as coughing, wheezing, and shortness of breath  · You reduce your risk for cancers of the lung, mouth, throat, kidney, bladder, pancreas, stomach, and cervix  If you already have cancer, you increase the benefits of chemotherapy  You also reduce your risk for cancer returning or a second cancer from developing  · You reduce your risk for heart disease, blood clots, heart attack, and stroke  · You reduce your risk for lung infections, and diseases such as pneumonia, asthma, chronic bronchitis, and emphysema  · Your circulation improves  More oxygen can be delivered to your body  If you have diabetes, you lower your risk for complications, such as kidney, artery, and eye diseases  You also lower your risk for nerve damage  Nerve damage can lead to amputations, poor vision, and blindness  · You improve your body's ability to heal and to fight infections  Benefits to the health of others if you stop smoking:  Tobacco is harmful to nonsmokers who breathe in your secondhand smoke  The following are ways the health of others around you may improve when you stop smoking:  · You lower the risks for lung cancer and heart disease in nonsmoking adults  · If you are pregnant, you lower the risk for miscarriage, early delivery, low birth weight, and stillbirth  You also lower your baby's risk for SIDS, obesity, developmental delay, and neurobehavioral problems, such as ADHD  · If you have children, you lower their risk for ear infections, colds, pneumonia, bronchitis, and asthma  For more information and support to stop smoking:   · Smokefree  gov  Phone: 7- 554 - 581-1939  Web Address: www smokefrSentrigo  Follow up with your healthcare provider as directed:  Write down your questions so you remember to ask them during your visits  © 2017 [address] [employer] Information is for End User's use only and may not be sold, redistributed or otherwise used for commercial purposes  All illustrations and images included in CareNotes® are the copyrighted property of [employer]  or Livan Soto  The above information is an  only  It is not intended as medical advice for individual conditions or treatments  Talk to your doctor, nurse or pharmacist before following any medical regimen to see if it is safe and effective for you  Fall Prevention   WHAT YOU NEED TO KNOW:   What is fall prevention? Fall prevention includes ways to make your home and other areas safer  It also includes ways you can move more carefully to prevent a fall  What increases my risk for falls? · Lack of vitamin D    · Not getting enough sleep each night    · Trouble walking or keeping your balance, or foot problems    · Health conditions that cause changes in your blood pressure, vision, or muscle strength and coordination    · Medicines that make you dizzy, weak, or sleepy    · Problems seeing clearly    · Shoes that have high heels or are not supportive    · Tripping hazards, such as items left on the floor, no handrails on the stairs, or broken steps  How can I help protect myself from falls? · Stand or sit up slowly  This may help you keep your balance and prevent falls  If you need to get up during the night, sit up first  Be sure you are fully awake before you stand  Turn on the light before you start walking  Go slowly in case you are still sleepy   Make sure you will not trip over any pets sleeping in the bedroom  · Use assistive devices as directed  Your healthcare provider may suggest that you use a cane or walker to help you keep your balance  You may need to have grab bars put in your bathroom near the toilet or in the shower  · Wear shoes that fit well and have soles that   Wear shoes both inside and outside  Use slippers with good   Do not wear shoes with high heels  · Wear a personal alarm  This is a device that allows you to call 911 if you fall and need help  Ask your healthcare provider for more information  · Stay active  Exercise can help strengthen your muscles and improve your balance  Your healthcare provider may recommend water aerobics or walking  He or she may also recommend physical therapy to improve your coordination  Never start an exercise program without talking to your healthcare provider first      · Manage medical conditions  Keep all appointments with your healthcare providers  Visit your eye doctor as directed  How can I make my home safer? · Add items to prevent falls in the bathroom  Put nonslip strips on your bath or shower floor to prevent you from slipping  Use a bath mat if you do not have carpet in the bathroom  This will prevent you from falling when you step out of the bath or shower  Use a shower seat so you do not need to stand while you shower  Sit on the toilet or a chair in your bathroom to dry yourself and put on clothing  This will prevent you from losing your balance from drying or dressing yourself while you are standing  · Keep paths clear  Remove books, shoes, and other objects from walkways and stairs  Place cords for telephones and lamps out of the way so that you do not need to walk over them  Tape them down if you cannot move them  Remove small rugs  If you cannot remove a rug, secure it with double-sided tape  This will prevent you from tripping  · Install bright lights in your home  Use night lights to help light paths to the bathroom or kitchen  Always turn on the light before you start walking  · Keep items you use often on shelves within reach  Do not use a step stool to help you reach an item  · Paint or place reflective tape on the edges of your stairs  This will help you see the stairs better  Call 911 or have someone else call if:   · You have fallen and are unconscious  · You have fallen and cannot move part of your body  Contact your healthcare provider if:   · You have fallen and have pain or a headache  · You have questions or concerns about your condition or care  CARE AGREEMENT:   You have the right to help plan your care  Learn about your health condition and how it may be treated  Discuss treatment options with your caregivers to decide what care you want to receive  You always have the right to refuse treatment  The above information is an  only  It is not intended as medical advice for individual conditions or treatments  Talk to your doctor, nurse or pharmacist before following any medical regimen to see if it is safe and effective for you  © 2017 [address] [facility] Information is for End User's use only and may not be sold, redistributed or otherwise used for commercial purposes  All illustrations and images included in CareNotes® are the copyrighted property of [employer]  or Livan Soto  Advance Directives   WHAT YOU NEED TO KNOW:   What are advance directives? Advance directives are legal documents that state your wishes and plans for medical care  These plans are made ahead of time in case you lose your ability to make decisions for yourself  Advance directives can apply to any medical decision, such as the treatments you want, and if you want to donate organs  What are the types of advance directives? There are many types of advance directives, and each state has rules about how to use them   You may choose a combination of any of the following:  · Living will: This is a written record of the treatment you want  You can also choose which treatments you do not want, which to limit, and which to stop at a certain time  This includes surgery, medicine, IV fluid, and tube feedings  · Durable power of  for healthcare [facility]): This is a written record that states who you want to make healthcare choices for you when you are unable to make them for yourself  This person, called a proxy, is usually a family member or a friend  You may choose more than 1 proxy  · Do not resuscitate (DNR) order:  A DNR order is used in case your heart stops beating or you stop breathing  It is a request not to have certain forms of treatment, such as CPR  A DNR order may be included in other types of advance directives  · Medical directive: This covers the care that you want if you are in a coma, near death, or unable to make decisions for yourself  You can list the treatments you want for each condition  Treatment may include pain medicine, surgery, blood transfusions, dialysis, IV or tube feedings, and a ventilator (breathing machine)  · Values history: This document has questions about your views, beliefs, and how you feel and think about life  This information can help others choose the care that you would choose  Why are advance directives important? An advance directive helps you control your care  Although spoken wishes may be used, it is better to have your wishes written down  Spoken wishes can be misunderstood, or not followed  Treatments may be given even if you do not want them  An advance directive may make it easier for your family to make difficult choices about your care  How do I decide what to put in my advance directives? · Make informed decisions:  Make sure you fully understand treatments or care you may receive   Think about the benefits and problems your decisions could cause for you or your family  Talk to healthcare providers if you have concerns or questions before you write down your wishes  You may also want to talk with your [employer] or , or a   Check your state laws to make sure that what you put in your advance directive is legal      · Sign all forms:  Sign and date your advance directive when you have finished  You may also need 2 witnesses to sign the forms  Witnesses cannot be your doctor or his staff, your spouse, heirs or beneficiaries, people you owe money to, or your chosen proxy  Talk to your family, proxy, and healthcare providers about your advance directive  Give each person a copy, and keep one for yourself in a place you can get to easily  Do not keep it hidden or locked away  · Review and revise your plans: You can revise your advance directive at any time, as long as you are able to make decisions  Review your plan every year, and when there are changes in your life, or your health  When you make changes, let your family, proxy, and healthcare providers know  Give each a new copy  Where can I find more information? · [employer]  [address] , [address]  Phone: 1- 518 - 561-0526  Phone: 4- 947 - [phone]  Web Address: http://www  aafp org  · [address] [facility])  [address] [facility], [address] [facility] , [address] [place] [address]  Phone: 1- 322 - [phone]  Phone: [phone]  Web Address: Osman gonzalez  [facility] AGREEMENT:   You have the right to help plan your care  To help with this plan, you must learn about your health condition and treatment options  You must also learn about advance directives and how they are used  Work with your healthcare providers to decide what care will be used to treat you  You always have the right to refuse treatment  The above information is an  only   It is not intended as medical advice for individual conditions or treatments  Talk to your doctor, nurse or pharmacist before following any medical regimen to see if it is safe and effective for you  © 2017 [address] [employer] Information is for End User's use only and may not be sold, redistributed or otherwise used for commercial purposes  All illustrations and images included in CareNotes® are the copyrighted property of [employer] M [employer]  or Livan Soto

## 2019-08-21 NOTE — PROGRESS NOTES
Assessment and Plan:     Problem List Items Addressed This Visit        Respiratory    COPD (chronic obstructive pulmonary disease) ([facility] )    Relevant Medications    fluticasone-vilanterol (BREO ELLIPTA) 100-25 mcg/inh inhaler       Cardiovascular and Mediastinum    Hypertension    Relevant Medications    losartan (COZAAR) 50 mg tablet    metoprolol succinate (TOPROL-XL) 25 mg 24 hr tablet    Other Relevant Orders    CBC and differential    Comprehensive metabolic panel       Other    Hyperlipidemia    Relevant Medications    simvastatin (ZOCOR) 40 mg tablet    Other Relevant Orders    Lipid panel    TSH, 3rd generation with Free T4 reflex      Other Visit Diagnoses     Need for Td vaccine    -  Primary    Relevant Orders    TD VACCINE GREATER THAN OR EQUAL TO 8YO PRESERVATIVE FREE IM    Type 2 diabetes mellitus with microalbuminuria, without long-term current use of insulin (HCC)        Relevant Medications    metFORMIN (GLUCOPHAGE) 1000 MG tablet    Other Relevant Orders    POCT hemoglobin A1c    Hemoglobin A1C    Microalbumin / creatinine urine ratio         History of Present Illness:     Patient presents for Medicare Annual Wellness visit    Patient Care Team:  Alaina El MD as PCP - MD Ana Soliz MD     Problem List:     Patient Active Problem List   Diagnosis    Cardiomyopathy [facility])    CHF (congestive heart failure), NYHA class I, chronic, systolic (HCC)    COPD (chronic obstructive pulmonary disease) ([facility] )    Hyperlipidemia    Hypertension    Old myocardial infarction    Paroxysmal supraventricular tachycardia ([address] )    Back skin lesion    Postoperative visit      Past Medical and Surgical History:     Past Medical History:   Diagnosis Date    Angina pectoris ([address] )     "in the past"    Basal cell carcinoma     Resolved 8/25/2017     Colitis 11/22/2017    COPD (chronic obstructive pulmonary disease) ([facility] [address] [facility] )     Diabetes mellitus (HCC)     NIDDM    Hyperlipidemia     Hypertension      Past Surgical History:   Procedure Laterality Date    CARDIAC SURGERY      cardiac cath    COLONOSCOPY      HAND SURGERY Left     KNEE ARTHROSCOPY Right     NECK SURGERY      DE EXC SKIN MALIG >4 CM FACE,FACIAL N/A 2016    Procedure: LEFT CHEEK BCC EXCISION; FROZEN SECTION; BILATERAL LOWER EYELIDS SUSPICIOUS LESION EXCISION; LEFT CHEEK EXTRACTION OF COMEDONES X 2;  Surgeon: Fely Pearson MD;  [facility];  Service: Plastics    DE EXC SKIN MALIG >4 CM TRUNK,ARM,LEG Left 2019    Procedure: EXCISION WIDE LESION TRUNK/ABDOMEN/BACK;  Surgeon: Xiang Tay MD;  [facility];  Service: General      Family History:     Family History   Problem Relation Age of Onset    Stomach cancer Father     Colon cancer Sister       Social History:     Social History     Tobacco Use   Smoking Status Former Smoker    Packs/day: 1 00    Years: 40 00    Pack years: 40 00    Last attempt to quit: 10/4/2017    Years since quittin 8   Smokeless Tobacco Never Used     Social History     Substance and Sexual Activity   Alcohol Use Yes    Alcohol/week: 1 0 standard drinks    Types: 1 Cans of beer per week    Comment: socially     Social History     Substance and Sexual Activity   Drug Use Never      Medications and Allergies:     Current Outpatient Medications   Medication Sig Dispense Refill    aspirin 81 MG tablet Take 81 mg by mouth daily        fluticasone-vilanterol (BREO ELLIPTA) 100-25 mcg/inh inhaler ONE PUFF DAILY 3 Inhaler 3    losartan (COZAAR) 50 mg tablet Take 1 tablet (50 mg total) by mouth daily for 90 days 90 tablet 3    metFORMIN (GLUCOPHAGE) 1000 MG tablet Take 1 tablet (1,000 mg total) by mouth 2 (two) times a day with meals for 92 days 180 tablet 3    metoprolol succinate (TOPROL-XL) 25 mg 24 hr tablet Take 1 tablet (25 mg total) by mouth daily 90 tablet 3    simvastatin (ZOCOR) 40 mg tablet Take 1 tablet (40 mg total) by mouth daily at bedtime for 90 days 90 tablet 3     No current facility-administered medications for this visit  No Known Allergies   Immunizations:     Immunization History   Administered Date(s) Administered    INFLUENZA 11/11/2013, 10/24/2014, 10/27/2017, 10/08/2018    Influenza Split High Dose Preservative Free IM 10/08/2018    Pneumococcal Conjugate 13-Valent 10/27/2017    Pneumococcal Polysaccharide PPV23 10/27/2018      Medicare Screening Tests and Risk Assessments:         Health Risk Assessment:  Patient feels that their physical health rating is Same  Eyesight was rated as Same  Hearing was rated as Same  Patient feels that their emotional and mental health rating is Slightly better  Pain experienced by patient in the last 7 days has been Some  Patient's pain rating has been 1/10  Patient states that he has experienced no weight loss or gain in last 6 months  Emotional/Mental Health:  Patient has not been feeling nervous/anxious  PHQ-9 Depression Screening:    Frequency of the following problems over the past two weeks:      1  Little interest or pleasure in doing things: 0 - not at all      2  Feeling down, depressed, or hopeless: 0 - not at all  PHQ-2 Score: 0          Broken Bones/Falls: Fall Risk Assessment:    In the past year, patient has experienced: No history of falling in past year          Bladder/Bowel:  Patient has not leaked urine accidently in the last six months  Patient reports no loss of bowel control  Immunizations:  Patient has not had a flu vaccination within the last year  Patient has received a pneumonia shot  Patient has not received a shingles shot  Patient has not received tetanus/diphtheria shot  Home Safety:  Patient does not have trouble with stairs inside or outside of their home  Patient currently reports that there are no safety hazards present in home, working smoke alarms, working carbon monoxide detectors        Preventative Screenings:   no prostate cancer screen performed, no colon cancer screen completed, cholesterol screen completed, no glaucoma eye exam completed    Nutrition:  Current diet: Diabetic and No Added Salt with servings of the following:    Medications:  Patient is not currently taking any over-the-counter supplements  Patient is able to manage medications  Lifestyle Choices:  Patient reports no tobacco use  Patient has smoked or used tobacco in the past   Patient has stopped his tobacco use  Tobacco use quit date: 01/01/2018  Patient reports alcohol use  Alcohol use per week: 7  Patient drives a vehicle  Patient wears seat belt  Current level of exercise of physical activity described by patient as: WALKING DOG, SHOPPING  Activities of Daily Living:  Can get out of bed by his or her self, able to dress self, able to make own meals, able to do own shopping, able to bathe self, can do own laundry/housekeeping, can manage own money, pay bills and track expenses    Previous Hospitalizations:  Hospitalization or ED visit in past 12 months  Number of hospitalizations within the last year: 1-2        Advanced Directives:  Patient has decided on a power of   Patient has spoken to designated power of   Patient has not completed advanced directive

## 2019-08-21 NOTE — PROGRESS NOTES
Assessment and Plan:     Problem List Items Addressed This Visit        Respiratory    COPD (chronic obstructive pulmonary disease) ([facility] [address] )    Relevant Medications    fluticasone-vilanterol (BREO ELLIPTA) 100-25 mcg/inh inhaler       Cardiovascular and Mediastinum    Cardiomyopathy ([facility] [address] )    Relevant Medications    metoprolol succinate (TOPROL-XL) 25 mg 24 hr tablet    Hypertension    Relevant Medications    losartan (COZAAR) 50 mg tablet    metoprolol succinate (TOPROL-XL) 25 mg 24 hr tablet    Other Relevant Orders    CBC and differential    Comprehensive metabolic panel       Other    Hyperlipidemia    Relevant Medications    simvastatin (ZOCOR) 40 mg tablet    Other Relevant Orders    Lipid panel    TSH, 3rd generation with Free T4 reflex      Other Visit Diagnoses     Type 2 diabetes mellitus with microalbuminuria, without long-term current use of insulin (HCC)    -  Primary    Relevant Medications    metFORMIN (GLUCOPHAGE) 1000 MG tablet    Other Relevant Orders    POCT hemoglobin A1c    Hemoglobin A1C    Microalbumin / creatinine urine ratio    Skin lesion        Relevant Orders    Tissue Exam    Medicare annual wellness visit, subsequent        Need for Td vaccine        Relevant Orders    TD VACCINE GREATER THAN OR EQUAL TO 6YO PRESERVATIVE FREE IM (Completed)         History of Present Illness:     Patient presents for Medicare Annual Wellness visit    Patient Care Team:  Corin Bermudez MD as PCP - MD Erika Guan MD     Problem List:     Patient Active Problem List   Diagnosis    Cardiomyopathy [facility])    CHF (congestive heart failure), NYHA class I, chronic, systolic ([facility]    COPD (chronic obstructive pulmonary disease) ([facility] )    Hyperlipidemia    Hypertension    Old myocardial infarction    Paroxysmal supraventricular tachycardia ([facility] [address] )    Back skin lesion      Past Medical and Surgical History:     Past Medical History:   Diagnosis Date    Angina pectoris ([facility] )     "in the past"    Basal cell carcinoma     Resolved 2017     Colitis 2017    COPD (chronic obstructive pulmonary disease) (HCC)     Diabetes mellitus (HCC)     NIDDM    Hyperlipidemia     Hypertension      Past Surgical History:   Procedure Laterality Date    CARDIAC SURGERY      cardiac cath    COLONOSCOPY      HAND SURGERY Left     KNEE ARTHROSCOPY Right     NECK SURGERY      MI EXC SKIN MALIG >4 CM FACE,FACIAL N/A 2016    Procedure: LEFT CHEEK BCC EXCISION; FROZEN SECTION; BILATERAL LOWER EYELIDS SUSPICIOUS LESION EXCISION; LEFT CHEEK EXTRACTION OF COMEDONES X 2;  Surgeon: Ranjana Zuniga MD;  [facility];  Service: Plastics    MI EXC SKIN MALIG >4 CM TRUNK,ARM,LEG Left 2019    Procedure: EXCISION WIDE LESION TRUNK/ABDOMEN/BACK;  Surgeon: Heide Cowan MD;  [facility];  Service: General      Family History:     Family History   Problem Relation Age of Onset    Stomach cancer Father     Colon cancer Sister       Social History:     Social History     Tobacco Use   Smoking Status Former Smoker    Packs/day: 1 00    Years: 40 00    Pack years: 40 00    Last attempt to quit: 10/4/2017    Years since quittin 8   Smokeless Tobacco Never Used     Social History     Substance and Sexual Activity   Alcohol Use Yes    Alcohol/week: 1 0 standard drinks    Types: 1 Cans of beer per week    Comment: socially     Social History     Substance and Sexual Activity   Drug Use Never      Medications and Allergies:     Current Outpatient Medications   Medication Sig Dispense Refill    aspirin 81 MG tablet Take 81 mg by mouth daily        fluticasone-vilanterol (BREO ELLIPTA) 100-25 mcg/inh inhaler ONE PUFF DAILY 3 Inhaler 3    losartan (COZAAR) 50 mg tablet Take 1 tablet (50 mg total) by mouth daily for 90 days 90 tablet 3    metFORMIN (GLUCOPHAGE) 1000 MG tablet Take 1 tablet (1,000 mg total) by mouth 2 (two) times a day with meals for 92 days 180 tablet 3    metoprolol succinate (TOPROL-XL) 25 mg 24 hr tablet Take 1 tablet (25 mg total) by mouth daily 90 tablet 3    simvastatin (ZOCOR) 40 mg tablet Take 1 tablet (40 mg total) by mouth daily at bedtime for 90 days 90 tablet 3     No current facility-administered medications for this visit  No Known Allergies   Immunizations:     Immunization History   Administered Date(s) Administered    INFLUENZA 11/11/2013, 10/24/2014, 10/27/2017, 10/08/2018    Influenza Split High Dose Preservative Free IM 10/08/2018    Pneumococcal Conjugate 13-Valent 10/27/2017    Pneumococcal Polysaccharide PPV23 10/27/2018    TD (adult) Preservative Free 08/21/2019      Medicare Screening Tests and Risk Assessments:     Esha Walker is here for his Subsequent Wellness visit  Last Medicare Wellness visit information reviewed, patient interviewed and updates made to the record today  Health Risk Assessment:  Patient rates overall health as very good  Patient feels that their physical health rating is Same  Eyesight was rated as Same  Hearing was rated as Same  Patient feels that their emotional and mental health rating is Same  Pain experienced by patient in the last 7 days has been None  Patient states that he has experienced no weight loss or gain in last 6 months  Emotional/Mental Health:  Patient has not been feeling nervous/anxious  PHQ-9 Depression Screening:    Frequency of the following problems over the past two weeks:      1  Little interest or pleasure in doing things: 0 - not at all      2  Feeling down, depressed, or hopeless: 0 - not at all  PHQ-2 Score: 0          Broken Bones/Falls: Fall Risk Assessment:    In the past year, patient has experienced: No history of falling in past year          Bladder/Bowel:  Patient has not leaked urine accidently in the last six months  Patient reports no loss of bowel control  Immunizations:  Patient has had a flu vaccination within the last year  Patient has received a pneumonia shot  Patient has received a shingles shot  Patient has not received tetanus/diphtheria shot  Preventative Screenings:   prostate cancer screen performed, colon cancer screen completed, cholesterol screen completed, glaucoma eye exam completed, (Additional Comments: Colonoscopy 2014  Lipid profile 11/2018  Eye exam 09/2018 )    Nutrition:  Current diet: Regular with servings of the following:    Medications:  Patient is currently taking over-the-counter supplements  List of OTC medications includes: Fish oils, ASA  Patient is able to manage medications  Lifestyle Choices:  Patient reports no tobacco use  Patient has smoked or used tobacco in the past   Patient has stopped his tobacco use  Patient reports alcohol use  Patient drives a vehicle  Patient wears seat belt  Current level of exercise of physical activity described by patient as: walking   Activities of Daily Living:  Can get out of bed by his or her self, able to dress self, able to make own meals, able to do own shopping, able to bathe self, can do own laundry/housekeeping, can manage own money, pay bills and track expenses    Previous Hospitalizations:  No hospitalization or ED visit in past 12 months        Advanced Directives:  Patient has decided on a power of   Patient has spoken to designated power of   Patient has not completed advanced directive          Preventative Screening/Counseling:      Cardiovascular:      General: Screening Not Indicated      Counseling: Healthy Diet and Healthy Weight      Comments: EKG 05/2019         Diabetes:      General: Screening Not Indicated      Comments: N/A type 2 DM         Colorectal Cancer:      General: Risks and Benefits Discussed and Patient Declines          Prostate Cancer:      General: Screening Not Indicated          Osteoporosis:      General: Screening Not Indicated          AAA:      General: Risks and Benefits Discussed and Patient Declines Glaucoma:      General: Screening Current          HIV:      General: Screening Not Indicated          Hepatitis C:      General: Risks and Benefits Discussed and Patient Declines        Advanced Directives:   Patient has no living will for healthcare, does not have durable POA for healthcare, patient does not have an advanced directive  Immunizations:      Influenza: Influenza Recommended Annually      Pneumococcal: Lifetime Vaccine Completed      Shingrix: Risks & Benefits Discussed and Patient Declines      TD: Td Vaccine Needed Today      Other Preventative Counseling (Non-Medicare):   Fall Prevention, Increase physical activity, Nutrition Counseling, Skin self-exam, Sunscreen use and Weight reduction discussed      Referrals:  Referral(s) to: Cardiologist

## 2019-08-28 ENCOUNTER — TELEPHONE (OUTPATIENT)
Dept: FAMILY MEDICINE CLINIC | Facility: CLINIC | Age: 74
End: 2019-08-28

## 2020-02-10 ENCOUNTER — TRANSCRIBE ORDERS (OUTPATIENT)
Dept: ADMINISTRATIVE | Age: 75
End: 2020-02-10

## 2020-02-10 ENCOUNTER — APPOINTMENT (OUTPATIENT)
Dept: LAB | Age: 75
End: 2020-02-10
Payer: COMMERCIAL

## 2020-02-10 LAB
ALBUMIN SERPL BCP-MCNC: 3.6 G/DL (ref 3.5–5)
ALP SERPL-CCNC: 83 U/L (ref 46–116)
ALT SERPL W P-5'-P-CCNC: 23 U/L (ref 12–78)
ANION GAP SERPL CALCULATED.3IONS-SCNC: 3 MMOL/L (ref 4–13)
AST SERPL W P-5'-P-CCNC: 14 U/L (ref 5–45)
BASOPHILS # BLD AUTO: 0.07 THOUSANDS/ΜL (ref 0–0.1)
BASOPHILS NFR BLD AUTO: 1 % (ref 0–1)
BILIRUB SERPL-MCNC: 0.66 MG/DL (ref 0.2–1)
BUN SERPL-MCNC: 17 MG/DL (ref 5–25)
CALCIUM SERPL-MCNC: 9 MG/DL (ref 8.3–10.1)
CHLORIDE SERPL-SCNC: 106 MMOL/L (ref 100–108)
CHOLEST SERPL-MCNC: 114 MG/DL (ref 50–200)
CO2 SERPL-SCNC: 28 MMOL/L (ref 21–32)
CREAT SERPL-MCNC: 0.93 MG/DL (ref 0.6–1.3)
CREAT UR-MCNC: 164 MG/DL
EOSINOPHIL # BLD AUTO: 0.13 THOUSAND/ΜL (ref 0–0.61)
EOSINOPHIL NFR BLD AUTO: 1 % (ref 0–6)
ERYTHROCYTE [DISTWIDTH] IN BLOOD BY AUTOMATED COUNT: 13.3 % (ref 11.6–15.1)
EST. AVERAGE GLUCOSE BLD GHB EST-MCNC: 148 MG/DL
GFR SERPL CREATININE-BSD FRML MDRD: 82 ML/MIN/1.73SQ M
GLUCOSE P FAST SERPL-MCNC: 130 MG/DL (ref 65–99)
HBA1C MFR BLD: 6.8 % (ref 4.2–6.3)
HCT VFR BLD AUTO: 43.8 % (ref 36.5–49.3)
HDLC SERPL-MCNC: 37 MG/DL
HGB BLD-MCNC: 14.6 G/DL (ref 12–17)
IMM GRANULOCYTES # BLD AUTO: 0.06 THOUSAND/UL (ref 0–0.2)
IMM GRANULOCYTES NFR BLD AUTO: 1 % (ref 0–2)
LDLC SERPL CALC-MCNC: 59 MG/DL (ref 0–100)
LYMPHOCYTES # BLD AUTO: 3.4 THOUSANDS/ΜL (ref 0.6–4.47)
LYMPHOCYTES NFR BLD AUTO: 35 % (ref 14–44)
MCH RBC QN AUTO: 30 PG (ref 26.8–34.3)
MCHC RBC AUTO-ENTMCNC: 33.3 G/DL (ref 31.4–37.4)
MCV RBC AUTO: 90 FL (ref 82–98)
MICROALBUMIN UR-MCNC: 22.4 MG/L (ref 0–20)
MICROALBUMIN/CREAT 24H UR: 14 MG/G CREATININE (ref 0–30)
MONOCYTES # BLD AUTO: 0.77 THOUSAND/ΜL (ref 0.17–1.22)
MONOCYTES NFR BLD AUTO: 8 % (ref 4–12)
NEUTROPHILS # BLD AUTO: 5.27 THOUSANDS/ΜL (ref 1.85–7.62)
NEUTS SEG NFR BLD AUTO: 54 % (ref 43–75)
NONHDLC SERPL-MCNC: 77 MG/DL
NRBC BLD AUTO-RTO: 0 /100 WBCS
PLATELET # BLD AUTO: 299 THOUSANDS/UL (ref 149–390)
PMV BLD AUTO: 9 FL (ref 8.9–12.7)
POTASSIUM SERPL-SCNC: 4.4 MMOL/L (ref 3.5–5.3)
PROT SERPL-MCNC: 6.8 G/DL (ref 6.4–8.2)
RBC # BLD AUTO: 4.87 MILLION/UL (ref 3.88–5.62)
SODIUM SERPL-SCNC: 137 MMOL/L (ref 136–145)
T4 FREE SERPL-MCNC: 0.95 NG/DL (ref 0.76–1.46)
TRIGL SERPL-MCNC: 92 MG/DL
TSH SERPL DL<=0.05 MIU/L-ACNC: 4.12 UIU/ML (ref 0.36–3.74)
WBC # BLD AUTO: 9.7 THOUSAND/UL (ref 4.31–10.16)

## 2020-02-10 PROCEDURE — 82570 ASSAY OF URINE CREATININE: CPT | Performed by: FAMILY MEDICINE

## 2020-02-10 PROCEDURE — 82043 UR ALBUMIN QUANTITATIVE: CPT | Performed by: FAMILY MEDICINE

## 2020-02-10 PROCEDURE — 84443 ASSAY THYROID STIM HORMONE: CPT | Performed by: FAMILY MEDICINE

## 2020-02-10 PROCEDURE — 80061 LIPID PANEL: CPT | Performed by: FAMILY MEDICINE

## 2020-02-10 PROCEDURE — 3061F NEG MICROALBUMINURIA REV: CPT | Performed by: FAMILY MEDICINE

## 2020-02-10 PROCEDURE — 36415 COLL VENOUS BLD VENIPUNCTURE: CPT | Performed by: FAMILY MEDICINE

## 2020-02-10 PROCEDURE — 85025 COMPLETE CBC W/AUTO DIFF WBC: CPT | Performed by: FAMILY MEDICINE

## 2020-02-10 PROCEDURE — 3044F HG A1C LEVEL LT 7.0%: CPT | Performed by: FAMILY MEDICINE

## 2020-02-10 PROCEDURE — 80053 COMPREHEN METABOLIC PANEL: CPT | Performed by: FAMILY MEDICINE

## 2020-02-10 PROCEDURE — 83036 HEMOGLOBIN GLYCOSYLATED A1C: CPT | Performed by: FAMILY MEDICINE

## 2020-02-10 PROCEDURE — 84439 ASSAY OF FREE THYROXINE: CPT | Performed by: FAMILY MEDICINE

## 2020-02-12 ENCOUNTER — TELEPHONE (OUTPATIENT)
Dept: FAMILY MEDICINE CLINIC | Facility: CLINIC | Age: 75
End: 2020-02-12

## 2020-02-12 NOTE — TELEPHONE ENCOUNTER
Call re labs he has an appt 02/24/2020   < 100  A1c 6 8 goal < 7  Cholesterol 114 < 200   TSH or thyroid borderline abnormal   I will review results at his next visit     Recent Results (from the past 504 hour(s))   Hemoglobin A1C    Collection Time: 02/10/20  7:53 AM   Result Value Ref Range    Hemoglobin A1C 6 8 (H) 4 2 - 6 3 %     mg/dl   CBC and differential    Collection Time: 02/10/20  7:53 AM   Result Value Ref Range    WBC 9 70 4 31 - 10 16 Thousand/uL    RBC 4 87 3 88 - 5 62 Million/uL    Hemoglobin 14 6 12 0 - 17 0 g/dL    Hematocrit 43 8 36 5 - 49 3 %    MCV 90 82 - 98 fL    MCH 30 0 26 8 - 34 3 pg    MCHC 33 3 31 4 - 37 4 g/dL    RDW 13 3 11 6 - 15 1 %    MPV 9 0 8 9 - 12 7 fL    Platelets 924 157 - 464 Thousands/uL    nRBC 0 /100 WBCs    Neutrophils Relative 54 43 - 75 %    Immat GRANS % 1 0 - 2 %    Lymphocytes Relative 35 14 - 44 %    Monocytes Relative 8 4 - 12 %    Eosinophils Relative 1 0 - 6 %    Basophils Relative 1 0 - 1 %    Neutrophils Absolute 5 27 1 85 - 7 62 Thousands/µL    Immature Grans Absolute 0 06 0 00 - 0 20 Thousand/uL    Lymphocytes Absolute 3 40 0 60 - 4 47 Thousands/µL    Monocytes Absolute 0 77 0 17 - 1 22 Thousand/µL    Eosinophils Absolute 0 13 0 00 - 0 61 Thousand/µL    Basophils Absolute 0 07 0 00 - 0 10 Thousands/µL   Comprehensive metabolic panel    Collection Time: 02/10/20  7:53 AM   Result Value Ref Range    Sodium 137 136 - 145 mmol/L    Potassium 4 4 3 5 - 5 3 mmol/L    Chloride 106 100 - 108 mmol/L    CO2 28 21 - 32 mmol/L    ANION GAP 3 (L) 4 - 13 mmol/L    BUN 17 5 - 25 mg/dL    Creatinine 0 93 0 60 - 1 30 mg/dL    Glucose, Fasting 130 (H) 65 - 99 mg/dL    Calcium 9 0 8 3 - 10 1 mg/dL    AST 14 5 - 45 U/L    ALT 23 12 - 78 U/L    Alkaline Phosphatase 83 46 - 116 U/L    Total Protein 6 8 6 4 - 8 2 g/dL    Albumin 3 6 3 5 - 5 0 g/dL    Total Bilirubin 0 66 0 20 - 1 00 mg/dL    eGFR 82 ml/min/1 73sq m   Lipid panel    Collection Time: 02/10/20  7:53 AM Result Value Ref Range    Cholesterol 114 50 - 200 mg/dL    Triglycerides 92 <=150 mg/dL    HDL, Direct 37 (L) >=40 mg/dL    LDL Calculated 59 0 - 100 mg/dL    Non-HDL-Chol (CHOL-HDL) 77 mg/dl   Microalbumin / creatinine urine ratio    Collection Time: 02/10/20  7:53 AM   Result Value Ref Range    Creatinine, Ur 164 0 mg/dL    Microalbum  ,U,Random 22 4 (H) 0 0 - 20 0 mg/L    Microalb Creat Ratio 14 0 - 30 mg/g creatinine   TSH, 3rd generation with Free T4 reflex    Collection Time: 02/10/20  7:53 AM   Result Value Ref Range    TSH 3RD GENERATON 4 120 (H) 0 358 - 3 740 uIU/mL   T4, free    Collection Time: 02/10/20  7:53 AM   Result Value Ref Range    Free T4 0 95 0 76 - 1 46 ng/dL

## 2020-02-23 NOTE — PROGRESS NOTES
Assessment/Plan:     Diagnoses and all orders for this visit:    Type 2 diabetes mellitus with microalbuminuria, without long-term current use of insulin (HCC)  -     Hemoglobin A1C    Essential hypertension  -     CBC and differential  -     Comprehensive metabolic panel    Mixed hyperlipidemia  -     Lipid panel    Chronic obstructive pulmonary disease, unspecified COPD type (HCC)    Paroxysmal supraventricular tachycardia (HCC)    Other cardiomyopathy ([facility] )          Continue with current medications  OV 6 months with repeat labs at that time  BMI Counseling: Body mass index is 26 14 kg/m²  The BMI is above normal  Nutrition recommendations include reducing portion sizes, decreasing overall calorie intake, consuming healthier snacks, moderation in carbohydrate intake, reducing intake of saturated fat and trans fat and reducing intake of cholesterol  Exercise recommendations include exercising 3-5 times per week  Patient ID: Cesar Blandon is a 69 y o  male  Follow-up visit  Medications reviewed  Type 2 DM diagnosed 04/2019 on Metformin 1,000 mg BID  02/2020 A1c 6 8  Urine micro albumin 22 4  On ARB  No hypoglycemic events  No DPN  Eye exam 09/2018  Hypertension blood pressures have been stable on Losartan 50 mg daily and Metoprolol ER 25 mg daily  02/2020 Creatinine 0 93  Electrolytes normal  Hgb 14  6  Hyperlipidemia on Simvastatin 40 mg daily  02/202  Lipid profile cholesterol 114  Triglycerides 92  HDL 37  LDL 59  LFTs normal  TSH 4 120 normal free T4 at 0 95  The following portions of the patient's history were reviewed and updated as appropriate: allergies, current medications, past family history, past medical history, past social history, past surgical history and problem list     Review of Systems   Constitutional: Positive for unexpected weight change (8 lb weight loss from 05/2019 with diet)  Negative for appetite change, chills, fatigue and fever     HENT: Negative for congestion, ear pain, hearing loss, rhinorrhea, sore throat, trouble swallowing and voice change  Eyes: Negative for visual disturbance  Respiratory: Positive for shortness of breath  Negative for cough and wheezing  Ex-smoker  COPD on Breo daily  Exertional dyspnea no changes  No orthopnea or PND   Cardiovascular: Negative for chest pain, palpitations and leg swelling  History of nonischemic cardiomyopathy followed by cardiology  08/2014 cardiac catheterization normal   11/2018 echocardiogram overall normal left ventricular systolic function  EF 55%  Normal right ventricle  Aortic sclerosis  Mild diastolic dysfunction  11/2017 echocardiogram mildly dilated left ventricle  Mildly reduced left ventricular systolic function  Possible inferoseptal as well as inferolateral wall motion abnormalities  Estimated EF 45%  Right ventricle normal   No significant valvular disease  12/2013 stress echocardiogram negative for ischemia  Abnormal septal motion/septal hypokinesis as well as global hypokinesis  EF 40%  Gastrointestinal: Negative for abdominal pain, blood in stool, constipation, diarrhea, nausea and vomiting  11/2017 ER visit for abdominal pain CT scan abdomen pelvis with contrast showed suggestion of mild circumferential diffuse colonic wall thickening  Indeterminate hypodense lesion lower pole left kidney 1 3 cm  No lymphadenopathy  12/2017 renal ultrasound bilateral renal cyst   Abnormality seen on CT scan corresponds to cyst with single thin septation   Endocrine: Negative for polydipsia and polyuria  See HPI   Genitourinary: Negative for difficulty urinating  Musculoskeletal: Negative for arthralgias and myalgias  Skin: Negative for rash  Status post resection BCC left cheek and left neck  05/2019 s/p resection SCC in situ left lower back  Allergic/Immunologic: Negative for environmental allergies  Neurological: Negative for dizziness and headaches  Hematological: Negative for adenopathy  Does not bruise/bleed easily  Psychiatric/Behavioral: Negative for dysphoric mood and sleep disturbance  Objective:      /70   Pulse 76   Temp (!) 97 °F (36 1 °C)   Resp 16   Ht 5' 9" (1 753 m)   Wt 80 3 kg (177 lb)   BMI 26 14 kg/m²     Wt Readings from Last 3 Encounters:   02/24/20 80 3 kg (177 lb)   08/21/19 80 7 kg (178 lb)   05/30/19 83 9 kg (185 lb)        Physical Exam   Constitutional: He is oriented to person, place, and time  He appears well-developed and well-nourished  No distress  HENT:   Right Ear: Tympanic membrane normal    Left Ear: Tympanic membrane normal    Mouth/Throat: Oropharynx is clear and moist    Eyes: Pupils are equal, round, and reactive to light  Conjunctivae and EOM are normal  No scleral icterus  Neck: No JVD present  Carotid bruit is not present  No tracheal deviation present  No thyroid mass and no thyromegaly present  Cardiovascular: Normal rate, regular rhythm and normal heart sounds  Exam reveals no gallop  No murmur heard  Pulses:       Carotid pulses are 2+ on the right side, and 2+ on the left side  + varicose veins left calf    Pulmonary/Chest: Effort normal and breath sounds normal  No respiratory distress  He has no wheezes  He has no rales  Abdominal: Soft  Bowel sounds are normal  He exhibits no distension, no abdominal bruit and no mass  There is no hepatosplenomegaly  There is no tenderness  There is no rebound and no guarding  Musculoskeletal: Normal range of motion  He exhibits no edema  Lymphadenopathy:     He has no cervical adenopathy  Neurological: He is alert and oriented to person, place, and time  No cranial nerve deficit  Skin: No rash noted  No cyanosis  Nails show no clubbing  Psychiatric: He has a normal mood and affect  Nursing note and vitals reviewed            Hemoglobin A1C   Date Value   02/10/2020 6 8 % (H)   08/21/2019 6 5   05/20/2019 8 5 % (H)   04/24/2019 10 1 (A)       Lab Results   Component Value Date    CHOLESTEROL 114 02/10/2020     Lab Results   Component Value Date    HDL 37 (L) 02/10/2020     Lab Results   Component Value Date    TRIG 92 02/10/2020     Lab Results   Component Value Date    NONHDLC 77 02/10/2020       Recent Results (from the past 672 hour(s))   Hemoglobin A1C    Collection Time: 02/10/20  7:53 AM   Result Value Ref Range    Hemoglobin A1C 6 8 (H) 4 2 - 6 3 %     mg/dl   CBC and differential    Collection Time: 02/10/20  7:53 AM   Result Value Ref Range    WBC 9 70 4 31 - 10 16 Thousand/uL    RBC 4 87 3 88 - 5 62 Million/uL    Hemoglobin 14 6 12 0 - 17 0 g/dL    Hematocrit 43 8 36 5 - 49 3 %    MCV 90 82 - 98 fL    MCH 30 0 26 8 - 34 3 pg    MCHC 33 3 31 4 - 37 4 g/dL    RDW 13 3 11 6 - 15 1 %    MPV 9 0 8 9 - 12 7 fL    Platelets 648 774 - 259 Thousands/uL    nRBC 0 /100 WBCs    Neutrophils Relative 54 43 - 75 %    Immat GRANS % 1 0 - 2 %    Lymphocytes Relative 35 14 - 44 %    Monocytes Relative 8 4 - 12 %    Eosinophils Relative 1 0 - 6 %    Basophils Relative 1 0 - 1 %    Neutrophils Absolute 5 27 1 85 - 7 62 Thousands/µL    Immature Grans Absolute 0 06 0 00 - 0 20 Thousand/uL    Lymphocytes Absolute 3 40 0 60 - 4 47 Thousands/µL    Monocytes Absolute 0 77 0 17 - 1 22 Thousand/µL    Eosinophils Absolute 0 13 0 00 - 0 61 Thousand/µL    Basophils Absolute 0 07 0 00 - 0 10 Thousands/µL   Comprehensive metabolic panel    Collection Time: 02/10/20  7:53 AM   Result Value Ref Range    Sodium 137 136 - 145 mmol/L    Potassium 4 4 3 5 - 5 3 mmol/L    Chloride 106 100 - 108 mmol/L    CO2 28 21 - 32 mmol/L    ANION GAP 3 (L) 4 - 13 mmol/L    BUN 17 5 - 25 mg/dL    Creatinine 0 93 0 60 - 1 30 mg/dL    Glucose, Fasting 130 (H) 65 - 99 mg/dL    Calcium 9 0 8 3 - 10 1 mg/dL    AST 14 5 - 45 U/L    ALT 23 12 - 78 U/L    Alkaline Phosphatase 83 46 - 116 U/L    Total Protein 6 8 6 4 - 8 2 g/dL    Albumin 3 6 3 5 - 5 0 g/dL    Total Bilirubin 0 66 0 20 - 1 00 mg/dL    eGFR 82 ml/min/1 73sq m   Lipid panel    Collection Time: 02/10/20  7:53 AM   Result Value Ref Range    Cholesterol 114 50 - 200 mg/dL    Triglycerides 92 <=150 mg/dL    HDL, Direct 37 (L) >=40 mg/dL    LDL Calculated 59 0 - 100 mg/dL    Non-HDL-Chol (CHOL-HDL) 77 mg/dl   Microalbumin / creatinine urine ratio    Collection Time: 02/10/20  7:53 AM   Result Value Ref Range    Creatinine, Ur 164 0 mg/dL    Microalbum  ,U,Random 22 4 (H) 0 0 - 20 0 mg/L    Microalb Creat Ratio 14 0 - 30 mg/g creatinine   TSH, 3rd generation with Free T4 reflex    Collection Time: 02/10/20  7:53 AM   Result Value Ref Range    TSH 3RD GENERATON 4 120 (H) 0 358 - 3 740 uIU/mL   T4, free    Collection Time: 02/10/20  7:53 AM   Result Value Ref Range    Free T4 0 95 0 76 - 1 46 ng/dL

## 2020-02-24 ENCOUNTER — OFFICE VISIT (OUTPATIENT)
Dept: FAMILY MEDICINE CLINIC | Facility: CLINIC | Age: 75
End: 2020-02-24
Payer: COMMERCIAL

## 2020-02-24 VITALS
WEIGHT: 177 LBS | RESPIRATION RATE: 16 BRPM | BODY MASS INDEX: 26.22 KG/M2 | TEMPERATURE: 97 F | HEART RATE: 76 BPM | HEIGHT: 69 IN | DIASTOLIC BLOOD PRESSURE: 70 MMHG | SYSTOLIC BLOOD PRESSURE: 124 MMHG

## 2020-02-24 DIAGNOSIS — I42.8 OTHER CARDIOMYOPATHY (HCC): ICD-10-CM

## 2020-02-24 DIAGNOSIS — I10 ESSENTIAL HYPERTENSION: ICD-10-CM

## 2020-02-24 DIAGNOSIS — E11.29 TYPE 2 DIABETES MELLITUS WITH MICROALBUMINURIA, WITHOUT LONG-TERM CURRENT USE OF INSULIN (HCC): Primary | ICD-10-CM

## 2020-02-24 DIAGNOSIS — I47.1 PAROXYSMAL SUPRAVENTRICULAR TACHYCARDIA (HCC): ICD-10-CM

## 2020-02-24 DIAGNOSIS — R80.9 TYPE 2 DIABETES MELLITUS WITH MICROALBUMINURIA, WITHOUT LONG-TERM CURRENT USE OF INSULIN (HCC): Primary | ICD-10-CM

## 2020-02-24 DIAGNOSIS — J44.9 CHRONIC OBSTRUCTIVE PULMONARY DISEASE, UNSPECIFIED COPD TYPE (HCC): ICD-10-CM

## 2020-02-24 DIAGNOSIS — E78.2 MIXED HYPERLIPIDEMIA: ICD-10-CM

## 2020-02-24 PROBLEM — C43.9 MALIGNANT MELANOMA (HCC): Status: ACTIVE | Noted: 2020-02-24

## 2020-02-24 PROBLEM — C43.9 MALIGNANT MELANOMA (HCC): Status: RESOLVED | Noted: 2020-02-24 | Resolved: 2020-02-24

## 2020-02-24 PROBLEM — L98.9 BACK SKIN LESION: Status: RESOLVED | Noted: 2019-05-07 | Resolved: 2020-02-24

## 2020-02-24 PROCEDURE — 99214 OFFICE O/P EST MOD 30 MIN: CPT | Performed by: FAMILY MEDICINE

## 2020-02-24 PROCEDURE — 1036F TOBACCO NON-USER: CPT | Performed by: FAMILY MEDICINE

## 2020-02-24 PROCEDURE — 1160F RVW MEDS BY RX/DR IN RCRD: CPT | Performed by: FAMILY MEDICINE

## 2020-02-24 PROCEDURE — 3074F SYST BP LT 130 MM HG: CPT | Performed by: FAMILY MEDICINE

## 2020-02-24 PROCEDURE — 3044F HG A1C LEVEL LT 7.0%: CPT | Performed by: FAMILY MEDICINE

## 2020-02-24 PROCEDURE — 3008F BODY MASS INDEX DOCD: CPT | Performed by: FAMILY MEDICINE

## 2020-02-24 PROCEDURE — 3060F POS MICROALBUMINURIA REV: CPT | Performed by: FAMILY MEDICINE

## 2020-02-24 PROCEDURE — 3066F NEPHROPATHY DOC TX: CPT | Performed by: FAMILY MEDICINE

## 2020-02-24 PROCEDURE — 4040F PNEUMOC VAC/ADMIN/RCVD: CPT | Performed by: FAMILY MEDICINE

## 2020-02-24 PROCEDURE — 3078F DIAST BP <80 MM HG: CPT | Performed by: FAMILY MEDICINE

## 2020-07-02 DIAGNOSIS — J44.9 CHRONIC OBSTRUCTIVE PULMONARY DISEASE, UNSPECIFIED COPD TYPE (HCC): ICD-10-CM

## 2020-07-03 RX ORDER — FLUTICASONE FUROATE AND VILANTEROL 100; 25 UG/1; UG/1
POWDER RESPIRATORY (INHALATION)
Qty: 180 EACH | Refills: 1 | Status: SHIPPED | OUTPATIENT
Start: 2020-07-03 | End: 2020-12-16

## 2020-07-29 DIAGNOSIS — R80.9 TYPE 2 DIABETES MELLITUS WITH MICROALBUMINURIA, WITHOUT LONG-TERM CURRENT USE OF INSULIN (HCC): ICD-10-CM

## 2020-07-29 DIAGNOSIS — E11.29 TYPE 2 DIABETES MELLITUS WITH MICROALBUMINURIA, WITHOUT LONG-TERM CURRENT USE OF INSULIN (HCC): ICD-10-CM

## 2020-11-10 ENCOUNTER — VBI (OUTPATIENT)
Dept: ADMINISTRATIVE | Facility: OTHER | Age: 75
End: 2020-11-10

## 2020-12-15 DIAGNOSIS — J44.9 CHRONIC OBSTRUCTIVE PULMONARY DISEASE, UNSPECIFIED COPD TYPE (HCC): ICD-10-CM

## 2020-12-16 RX ORDER — FLUTICASONE FUROATE AND VILANTEROL TRIFENATATE 100; 25 UG/1; UG/1
POWDER RESPIRATORY (INHALATION)
Qty: 180 EACH | Refills: 3 | Status: SHIPPED | OUTPATIENT
Start: 2020-12-16 | End: 2022-05-31 | Stop reason: SDUPTHER

## 2021-01-20 ENCOUNTER — TELEMEDICINE (OUTPATIENT)
Dept: FAMILY MEDICINE CLINIC | Facility: CLINIC | Age: 76
End: 2021-01-20
Payer: COMMERCIAL

## 2021-01-20 DIAGNOSIS — L03.113 CELLULITIS OF RIGHT UPPER EXTREMITY: Primary | ICD-10-CM

## 2021-01-20 PROCEDURE — 99213 OFFICE O/P EST LOW 20 MIN: CPT | Performed by: FAMILY MEDICINE

## 2021-01-20 NOTE — PROGRESS NOTES
Virtual Regular Visit      Assessment/Plan:    Problem List Items Addressed This Visit     None      Visit Diagnoses     Cellulitis of right upper extremity    -  Primary    Relevant Orders    CBC    Basic metabolic panel    C-reactive protein    Blood culture        Patient currently being treated with cellulitis her right hand and wrist with Bactrim  Patient and daughter report that the area of erythema has improved since switching to Bactrim from Keflex  He was previously having episodes of confusion which resolved however he reports he does Hospital ongoing chills and diaphoresis though improved from initial presentation  Recommended that patient continue course of Bactrim, will assess CBC, metabolic panel, CRP, blood cultures and if any significant abnormal findings would consider either imaging or referral to ER for further evaluation and management  Strict ER precautions given for fevers, chills, altered mental status  Follow-up if symptoms do not improve  Reason for visit is   Chief Complaint   Patient presents with    Virtual Regular Visit        Encounter provider Socorro Barton MD    Provider located at [place] [address]  [phone]      Recent Visits  No visits were found meeting these conditions  Showing recent visits within past 7 days and meeting all other requirements     Today's Visits  Date Type Provider Dept   01/20/21 Telemedicine Socorro Barton MD [facility]   Showing today's visits and meeting all other requirements     Future Appointments  No visits were found meeting these conditions  Showing future appointments within next 150 days and meeting all other requirements        The patient was identified by name and date of birth   Abhay Hunter was informed that this is a telemedicine visit and that the visit is being conducted through [facility] and patient was informed that this is a secure, HIPAA-compliant platform  He agrees to proceed     My office door was closed  No one else was in the room  He acknowledged consent and understanding of privacy and security of the video platform  The patient has agreed to participate and understands they can discontinue the visit at any time  Patient is aware this is a billable service  Subjective  Reji Reza is a 70 y o  male for virtual sick visit  HPI     He report that two days ago wrist swelled  He went to Urgent Care at [facility] on Sunday and was started on an antibiotic with Keflex intially  Patient and daughter report that the redness spread up his arm yesterday so they went back to Urgent Care and was started on Bactrim last night  His daughter reports that he had shaking chills, confusion on Sunday in addition to the arm redness and swelling  Miguel A Bruch He is no longer having confusion, he had chills this morning       Past Medical History:   Diagnosis Date    Angina pectoris ([facility] )     "in the past"    Basal cell carcinoma     Resolved 8/25/2017     Colitis 11/22/2017    COPD (chronic obstructive pulmonary disease) ([facility] )     Diabetes mellitus ([facility] )     NIDDM    Hyperlipidemia     Hypertension     [facility] 2/24/2020       Past Surgical History:   Procedure Laterality Date    CARDIAC SURGERY  2014    cardiac cath    COLONOSCOPY      HAND SURGERY Left     KNEE ARTHROSCOPY Right     NECK SURGERY      MT EXC SKIN MALIG >4 CM FACE,FACIAL N/A 4/29/2016    Procedure: LEFT CHEEK BCC EXCISION; FROZEN SECTION; BILATERAL LOWER EYELIDS SUSPICIOUS LESION EXCISION; LEFT CHEEK EXTRACTION OF COMEDONES X 2;  Surgeon: Vinod Meade MD;  [facility];  Service: Plastics    MT EXC SKIN MALIG >4 CM TRUNK,ARM,LEG Left 5/30/2019    Procedure: EXCISION WIDE LESION TRUNK/ABDOMEN/BACK;  Surgeon: Samella Najjar, MD;  [facility];  Service: General       Current Outpatient Medications   Medication Sig Dispense Refill    aspirin 81 MG tablet Take 81 mg by mouth daily   fluticasone-vilanterol (Breo Ellipta) 100-25 mcg/inh inhaler USE 1 INHALATION BY MOUTH  DAILY 180 each 3    losartan (COZAAR) 50 mg tablet Take 1 tablet (50 mg total) by mouth daily for 90 days 90 tablet 3    metFORMIN (GLUCOPHAGE) 1000 MG tablet TAKE 1 TABLET BY MOUTH 2  TIMES A DAY WITH MEALS 180 tablet 3    metoprolol succinate (TOPROL-XL) 25 mg 24 hr tablet Take 1 tablet (25 mg total) by mouth daily 90 tablet 3    simvastatin (ZOCOR) 40 mg tablet Take 1 tablet (40 mg total) by mouth daily at bedtime for 90 days 90 tablet 3     No current facility-administered medications for this visit  No Known Allergies    Review of Systems   Constitutional: Positive for chills and diaphoresis  Negative for fatigue  Musculoskeletal: Positive for joint swelling  Skin: Positive for color change  Psychiatric/Behavioral: Positive for confusion  Video Exam    There were no vitals filed for this visit  Physical Exam  Constitutional:       General: He is not in acute distress  Appearance: He is not ill-appearing or toxic-appearing  Neck:      Musculoskeletal: Normal range of motion  Pulmonary:      Effort: Pulmonary effort is normal  No respiratory distress  Skin:     Comments: Patient reports redness in left hand and wrist, denies any areas concerning for abscess  I unfortunately am not able to appreciate any erythema or induration or fluctuance over virtual visit   Neurological:      Mental Status: He is alert  I spent 12 minutes with patient today in which greater than 50% of the time was spent in counseling/coordination of care regarding impressions, instructions for management  VIRTUAL VISIT DISCLAIMER    Sheyla Bustos acknowledges that he has consented to an online visit or consultation   He understands that the online visit is based solely on information provided by him, and that, in the absence of a face-to-face physical evaluation by the physician, the diagnosis he receives is both limited and provisional in terms of accuracy and completeness  This is not intended to replace a full medical face-to-face evaluation by the physician  Sheyla Bustos understands and accepts these terms

## 2021-01-21 ENCOUNTER — LAB (OUTPATIENT)
Dept: LAB | Facility: CLINIC | Age: 76
End: 2021-01-21
Payer: COMMERCIAL

## 2021-01-21 DIAGNOSIS — L03.113 CELLULITIS OF RIGHT UPPER EXTREMITY: ICD-10-CM

## 2021-01-21 LAB
ANION GAP SERPL CALCULATED.3IONS-SCNC: 5 MMOL/L (ref 4–13)
BUN SERPL-MCNC: 20 MG/DL (ref 5–25)
CALCIUM SERPL-MCNC: 9.3 MG/DL (ref 8.3–10.1)
CHLORIDE SERPL-SCNC: 108 MMOL/L (ref 100–108)
CO2 SERPL-SCNC: 27 MMOL/L (ref 21–32)
CREAT SERPL-MCNC: 1.02 MG/DL (ref 0.6–1.3)
CRP SERPL QL: 18.7 MG/L
ERYTHROCYTE [DISTWIDTH] IN BLOOD BY AUTOMATED COUNT: 13 % (ref 11.6–15.1)
GFR SERPL CREATININE-BSD FRML MDRD: 73 ML/MIN/1.73SQ M
GLUCOSE P FAST SERPL-MCNC: 115 MG/DL (ref 65–99)
HCT VFR BLD AUTO: 44 % (ref 36.5–49.3)
HGB BLD-MCNC: 14 G/DL (ref 12–17)
MCH RBC QN AUTO: 29.2 PG (ref 26.8–34.3)
MCHC RBC AUTO-ENTMCNC: 31.8 G/DL (ref 31.4–37.4)
MCV RBC AUTO: 92 FL (ref 82–98)
PLATELET # BLD AUTO: 307 THOUSANDS/UL (ref 149–390)
PMV BLD AUTO: 9.6 FL (ref 8.9–12.7)
POTASSIUM SERPL-SCNC: 4.6 MMOL/L (ref 3.5–5.3)
RBC # BLD AUTO: 4.8 MILLION/UL (ref 3.88–5.62)
SODIUM SERPL-SCNC: 140 MMOL/L (ref 136–145)
WBC # BLD AUTO: 6.73 THOUSAND/UL (ref 4.31–10.16)

## 2021-01-21 PROCEDURE — 36415 COLL VENOUS BLD VENIPUNCTURE: CPT

## 2021-01-21 PROCEDURE — 80048 BASIC METABOLIC PNL TOTAL CA: CPT

## 2021-01-21 PROCEDURE — 86140 C-REACTIVE PROTEIN: CPT

## 2021-01-21 PROCEDURE — 85027 COMPLETE CBC AUTOMATED: CPT

## 2021-01-21 PROCEDURE — 87040 BLOOD CULTURE FOR BACTERIA: CPT

## 2021-01-22 ENCOUNTER — TELEPHONE (OUTPATIENT)
Dept: FAMILY MEDICINE CLINIC | Facility: CLINIC | Age: 76
End: 2021-01-22

## 2021-01-22 NOTE — TELEPHONE ENCOUNTER
I could not continue previously telephone encounter    If patient on Bactrim for 7 days he should not run out until next week  I wonder if he is taking this 4 times daily, when it is a twice daily medication, since he was initially prescribed Keflex which is 4 times daily  He should take Bactrim only twice daily  As long as it is improving he should be fine to complete the course once he runs out of medication

## 2021-01-22 NOTE — TELEPHONE ENCOUNTER
This is a really difficult situation  Especially since I have no records from what was recommended at the  Urgent care other than what patient's daughter told me the other day, which I am not sure was correct  I feel as though as long as it is getting better not getting worse he should complete the course of treatment with the current antibiotic  If he has worsening symptoms he should just be seen in the  ER

## 2021-01-26 LAB — BACTERIA BLD CULT: NORMAL

## 2021-05-03 ENCOUNTER — APPOINTMENT (OUTPATIENT)
Dept: RADIOLOGY | Age: 76
End: 2021-05-03
Payer: COMMERCIAL

## 2021-05-03 ENCOUNTER — OFFICE VISIT (OUTPATIENT)
Dept: URGENT CARE | Age: 76
End: 2021-05-03
Payer: COMMERCIAL

## 2021-05-03 ENCOUNTER — TELEPHONE (OUTPATIENT)
Dept: FAMILY MEDICINE CLINIC | Facility: CLINIC | Age: 76
End: 2021-05-03

## 2021-05-03 VITALS
HEART RATE: 94 BPM | TEMPERATURE: 97.3 F | DIASTOLIC BLOOD PRESSURE: 66 MMHG | RESPIRATION RATE: 18 BRPM | OXYGEN SATURATION: 97 % | SYSTOLIC BLOOD PRESSURE: 133 MMHG

## 2021-05-03 DIAGNOSIS — M54.50 ACUTE MIDLINE LOW BACK PAIN WITHOUT SCIATICA: ICD-10-CM

## 2021-05-03 DIAGNOSIS — M10.9 ACUTE GOUTY ARTHRITIS: ICD-10-CM

## 2021-05-03 DIAGNOSIS — I10 ESSENTIAL HYPERTENSION: ICD-10-CM

## 2021-05-03 DIAGNOSIS — E11.29 TYPE 2 DIABETES MELLITUS WITH MICROALBUMINURIA, WITHOUT LONG-TERM CURRENT USE OF INSULIN (HCC): Primary | ICD-10-CM

## 2021-05-03 DIAGNOSIS — R60.0 EDEMA OF RIGHT FOOT: Primary | ICD-10-CM

## 2021-05-03 DIAGNOSIS — R60.0 EDEMA OF RIGHT FOOT: ICD-10-CM

## 2021-05-03 DIAGNOSIS — R80.9 TYPE 2 DIABETES MELLITUS WITH MICROALBUMINURIA, WITHOUT LONG-TERM CURRENT USE OF INSULIN (HCC): Primary | ICD-10-CM

## 2021-05-03 DIAGNOSIS — E78.2 MIXED HYPERLIPIDEMIA: ICD-10-CM

## 2021-05-03 PROCEDURE — 72110 X-RAY EXAM L-2 SPINE 4/>VWS: CPT

## 2021-05-03 PROCEDURE — 99213 OFFICE O/P EST LOW 20 MIN: CPT | Performed by: NURSE PRACTITIONER

## 2021-05-03 PROCEDURE — 73630 X-RAY EXAM OF FOOT: CPT

## 2021-05-03 PROCEDURE — S9083 URGENT CARE CENTER GLOBAL: HCPCS | Performed by: NURSE PRACTITIONER

## 2021-05-03 RX ORDER — KETOROLAC TROMETHAMINE 30 MG/ML
30 INJECTION, SOLUTION INTRAMUSCULAR; INTRAVENOUS ONCE
Status: COMPLETED | OUTPATIENT
Start: 2021-05-03 | End: 2021-05-03

## 2021-05-03 RX ADMIN — KETOROLAC TROMETHAMINE 30 MG: 30 INJECTION, SOLUTION INTRAMUSCULAR; INTRAVENOUS at 14:33

## 2021-05-03 NOTE — PROGRESS NOTES
[facility]        NAME: Gris Camejo is a 70 y o  male  : 1945    MRN: [id number]  DATE: May 3, 2021  TIME: 2:28 PM    Assessment and Plan   Edema of right foot [R60 0]  1  Edema of right foot  XR foot 3+ vw right   2  Acute midline low back pain without sciatica  XR spine lumbar minimum 4 views non injury    ketorolac (TORADOL) injection 30 mg         Patient Instructions     Toradol IM at the clinic  NSAID OTC prn for pain on the R foot and lower back  X-rays on the lower back and R foot; results pending  Follow up with PCP in 3-5 days  Proceed to  ER if symptoms worsen  Chief Complaint     Chief Complaint   Patient presents with    Leg Swelling     LE, symptoms x 4 days     Leg Pain     entire leg up into groin     Shortness of Breath    Back Pain         History of Present Illness       HPI   Reports right foot pain for a few days  No fever  No trauma  Sometimes also has pain on the right groin, intermittent  No radiation  Last episode was 2 days ago  Also low back pain  Review of Systems   Review of Systems   Constitutional: Negative for fever  Respiratory: Negative for shortness of breath  Cardiovascular: Negative for chest pain  Musculoskeletal: Positive for back pain (lower back) and gait problem (d/t pain on the right foot)  Negative for joint swelling  Skin: Negative for wound  Current Medications       Current Outpatient Medications:     aspirin 81 MG tablet, Take 81 mg by mouth daily  , Disp: , Rfl:     fluticasone-vilanterol (Breo Ellipta) 100-25 mcg/inh inhaler, USE 1 INHALATION BY MOUTH  DAILY, Disp: 180 each, Rfl: 3    losartan (COZAAR) 50 mg tablet, Take 1 tablet (50 mg total) by mouth daily for 90 days, Disp: 90 tablet, Rfl: 3    metFORMIN (GLUCOPHAGE) 1000 MG tablet, TAKE 1 TABLET BY MOUTH 2  TIMES A DAY WITH MEALS, Disp: 180 tablet, Rfl: 3    metoprolol succinate (TOPROL-XL) 25 mg 24 hr tablet, Take 1 tablet (25 mg total) by mouth daily, Disp: 90 tablet, Rfl: 3    simvastatin (ZOCOR) 40 mg tablet, Take 1 tablet (40 mg total) by mouth daily at bedtime for 90 days, Disp: 90 tablet, Rfl: 3    Current Facility-Administered Medications:     ketorolac (TORADOL) injection 30 mg, 30 mg, Intramuscular, Once, Martha FernandoISSA herrera    Current Allergies     Allergies as of 05/03/2021    (No Known Allergies)            The following portions of the patient's history were reviewed and updated as appropriate: allergies, current medications, past family history, past medical history, past social history, past surgical history and problem list      Past Medical History:   Diagnosis Date    [facility]     "in [facility] past"    Basal cell carcinoma     Resolved 8/25/2017     Colitis 11/22/2017    [facility] obstructive [facility] disease) ([facility] )     Diabetes mellitus ([facility] )     NIDDM    Hyperlipidemia     Hypertension     Malignant melanoma ([facility] ) 2/24/2020       Past Surgical History:   Procedure Laterality Date    CARDIAC SURGERY  2014    cardiac cath    COLONOSCOPY      HAND SURGERY Left     KNEE ARTHROSCOPY Right     NECK SURGERY      PA EXC SKIN MALIG >4 CM FACE,FACIAL N/A 4/29/2016    Procedure: LEFT CHEEK BCC EXCISION; FROZEN SECTION; BILATERAL LOWER EYELIDS SUSPICIOUS LESION EXCISION; LEFT CHEEK EXTRACTION OF COMEDONES X 2;  Surgeon: Queenie Wallace MD;  [facility];  Service: Plastics    PA EXC SKIN MALIG >4 CM TRUNK,ARM,LEG Left 5/30/2019    Procedure: EXCISION WIDE LESION TRUNK/ABDOMEN/BACK;  Surgeon: Pipo Martinez MD;  [facility];  Service: General       Family History   Problem Relation Age of Onset    Stomach cancer Father     Colon cancer Sister          Medications have been verified  Objective   /66   Pulse 94   Temp (!) 97 3 °F (36 3 °C)   Resp 18   SpO2 97%   No LMP for male patient  Physical Exam     Physical Exam  Pulmonary:      Breath sounds: Normal breath sounds   No decreased breath sounds or wheezing  Musculoskeletal:      Right lower leg: He exhibits tenderness (with palpation of the R foot)  Edema (Moderate to severe swelling of the entire R foot) present  Left lower leg: He exhibits no tenderness  No edema (with varicose veins)  Skin:     Capillary Refill: Capillary refill takes less than 2 seconds  Findings: Erythema (the base of the R great toe) present  Comments: Dry skin on the B/L feet  Advised to use moisturizing foot cream daily   Neurological:      Mental Status: He is alert

## 2021-05-03 NOTE — TELEPHONE ENCOUNTER
I put in an order for uric acid level he has not had complete labs since 02/2020  Type 2 diabetes mellitus, hypertension and hyperlipidemia- I put in labs for his other conditions as well      Current Outpatient Medications:     aspirin 81 MG tablet, Take 81 mg by mouth daily  , Disp: , Rfl:     fluticasone-vilanterol (Breo Ellipta) 100-25 mcg/inh inhaler, USE 1 INHALATION BY MOUTH  DAILY, Disp: 180 each, Rfl: 3    losartan (COZAAR) 50 mg tablet, Take 1 tablet (50 mg total) by mouth daily for 90 days, Disp: 90 tablet, Rfl: 3    metFORMIN (GLUCOPHAGE) 1000 MG tablet, TAKE 1 TABLET BY MOUTH 2  TIMES A DAY WITH MEALS, Disp: 180 tablet, Rfl: 3    metoprolol succinate (TOPROL-XL) 25 mg 24 hr tablet, Take 1 tablet (25 mg total) by mouth daily, Disp: 90 tablet, Rfl: 3    simvastatin (ZOCOR) 40 mg tablet, Take 1 tablet (40 mg total) by mouth daily at bedtime for 90 days, Disp: 90 tablet, Rfl: 3  No current facility-administered medications for this visit

## 2021-05-03 NOTE — TELEPHONE ENCOUNTER
Daughter called  Patient was seen in urgent care for gout  Urgent care recommended labwork be completed  Can labs be ordered for patient without seeing him?

## 2021-05-03 NOTE — PATIENT INSTRUCTIONS
Acute Low Back Pain   WHAT YOU NEED TO KNOW:   Acute low back pain is sudden discomfort in your lower back area that lasts for up to 6 weeks  The discomfort makes it difficult to tolerate activity  DISCHARGE INSTRUCTIONS:   Return to the emergency department if:   · You have severe pain  · You have sudden stiffness and heaviness on both buttocks down to both legs  · You have numbness or weakness in one leg, or pain in both legs  · You have numbness in your genital area or across your lower back  · You cannot control your urine or bowel movements  Contact your healthcare provider if:   · You have a fever  · You have pain at night or when you rest     · Your pain does not get better with treatment  · You have pain that worsens when you cough or sneeze  · You suddenly feel something pop or snap in your back  · You have questions or concerns about your condition or care  Medicines: You may need any of the following:  · NSAIDs  help decrease swelling and pain  This medicine is available with or without a doctor's order  NSAIDs can cause stomach bleeding or kidney problems in certain people  If you take blood thinner medicine, always ask your healthcare provider if NSAIDs are safe for you  Always read the medicine label and follow directions  · Acetaminophen  decreases pain and fever  It is available without a doctor's order  Ask how much to take and how often to take it  Follow directions  Read the labels of all other medicines you are using to see if they also contain acetaminophen, or ask your doctor or pharmacist  Acetaminophen can cause liver damage if not taken correctly  Do not use more than 4 grams (4,000 milligrams) total of acetaminophen in one day  · Muscle relaxers  decrease pain by relaxing the muscles in your lower spine  · Prescription pain medicine  may be given  Ask your healthcare provider how to take this medicine safely   Some prescription pain medicines contain acetaminophen  Do not take other medicines that contain acetaminophen without talking to your healthcare provider  Too much acetaminophen may cause liver damage  Prescription pain medicine may cause constipation  Ask your healthcare provider how to prevent or treat constipation  Self-care:   · Stay active  as much as you can without causing more pain  Bed rest could make your back pain worse  Start with some light exercises, such as walking  Avoid heavy lifting until your pain is gone  Ask for more information about the activities or exercises that are right for you  · Apply ice  on your back for 15 to 20 minutes every hour or as directed  Use an ice pack, or put crushed ice in a plastic bag  Cover it with a towel before you apply it to your skin  Ice helps prevent tissue damage and decreases swelling and pain  · Apply heat  on your back for 20 to 30 minutes every 2 hours for as many days as directed  Heat helps decrease pain and muscle spasms  Alternate heat and ice  Prevent acute low back pain:   · Use proper body mechanics  ? Bend at the hips and knees when you  objects  Do not bend from the waist  Use your leg muscles as you lift the load  Do not use your back  Keep the object close to your chest as you lift it  Try not to twist or lift anything above your waist     ? Change your position often when you stand for long periods of time  Rest one foot on a small box or footrest, and then switch to the other foot often  ? Try not to sit for long periods of time  When you do, sit in a straight-backed chair with your feet flat on the floor  Never reach, pull, or push while you are sitting  · Do exercises that strengthen your back muscles  Warm up before you exercise  Ask your healthcare provider the best exercises for you  · Maintain a healthy weight  Ask your healthcare provider how much you should weigh   Ask him or her to help you create a weight loss plan if you are overweight  Follow up with your healthcare provider as directed:  Return for a follow-up visit if you still have pain after 1 to 3 weeks of treatment  You may need to visit an orthopedist if your back pain lasts longer than 12 weeks  Write down your questions so you remember to ask them during your visits  © Copyright [address] Information is for End User's use only and may not be sold, redistributed or otherwise used for commercial purposes  All illustrations and images included in CareNotes® are the copyrighted property of A D [employer] M [employer]  or [facility] Darell Jiménez   The above information is an  only  It is not intended as medical advice for individual conditions or treatments  Talk to your doctor, nurse or pharmacist before following any medical regimen to see if it is safe and effective for you

## 2021-05-04 ENCOUNTER — APPOINTMENT (OUTPATIENT)
Dept: LAB | Age: 76
End: 2021-05-04
Payer: COMMERCIAL

## 2021-05-04 DIAGNOSIS — M10.9 ACUTE GOUTY ARTHRITIS: ICD-10-CM

## 2021-05-04 LAB
ALBUMIN SERPL BCP-MCNC: 3.3 G/DL (ref 3.5–5)
ALP SERPL-CCNC: 95 U/L (ref 46–116)
ALT SERPL W P-5'-P-CCNC: 31 U/L (ref 12–78)
ANION GAP SERPL CALCULATED.3IONS-SCNC: 6 MMOL/L (ref 4–13)
AST SERPL W P-5'-P-CCNC: 19 U/L (ref 5–45)
BASOPHILS # BLD AUTO: 0.04 THOUSANDS/ΜL (ref 0–0.1)
BASOPHILS NFR BLD AUTO: 0 % (ref 0–1)
BILIRUB SERPL-MCNC: 0.65 MG/DL (ref 0.2–1)
BUN SERPL-MCNC: 35 MG/DL (ref 5–25)
CALCIUM ALBUM COR SERPL-MCNC: 10.3 MG/DL (ref 8.3–10.1)
CALCIUM SERPL-MCNC: 9.7 MG/DL (ref 8.3–10.1)
CHLORIDE SERPL-SCNC: 107 MMOL/L (ref 100–108)
CHOLEST SERPL-MCNC: 116 MG/DL (ref 50–200)
CO2 SERPL-SCNC: 26 MMOL/L (ref 21–32)
CREAT SERPL-MCNC: 1.2 MG/DL (ref 0.6–1.3)
CREAT UR-MCNC: 295 MG/DL
EOSINOPHIL # BLD AUTO: 0.03 THOUSAND/ΜL (ref 0–0.61)
EOSINOPHIL NFR BLD AUTO: 0 % (ref 0–6)
ERYTHROCYTE [DISTWIDTH] IN BLOOD BY AUTOMATED COUNT: 12.7 % (ref 11.6–15.1)
EST. AVERAGE GLUCOSE BLD GHB EST-MCNC: 128 MG/DL
GFR SERPL CREATININE-BSD FRML MDRD: 60 ML/MIN/1.73SQ M
GLUCOSE SERPL-MCNC: 102 MG/DL (ref 65–140)
HBA1C MFR BLD: 6.1 %
HCT VFR BLD AUTO: 43 % (ref 36.5–49.3)
HDLC SERPL-MCNC: 40 MG/DL
HGB BLD-MCNC: 13.8 G/DL (ref 12–17)
IMM GRANULOCYTES # BLD AUTO: 0.03 THOUSAND/UL (ref 0–0.2)
IMM GRANULOCYTES NFR BLD AUTO: 0 % (ref 0–2)
LDLC SERPL CALC-MCNC: 58 MG/DL (ref 0–100)
LYMPHOCYTES # BLD AUTO: 1.96 THOUSANDS/ΜL (ref 0.6–4.47)
LYMPHOCYTES NFR BLD AUTO: 20 % (ref 14–44)
MCH RBC QN AUTO: 28.6 PG (ref 26.8–34.3)
MCHC RBC AUTO-ENTMCNC: 32.1 G/DL (ref 31.4–37.4)
MCV RBC AUTO: 89 FL (ref 82–98)
MICROALBUMIN UR-MCNC: 113 MG/L (ref 0–20)
MICROALBUMIN/CREAT 24H UR: 38 MG/G CREATININE (ref 0–30)
MONOCYTES # BLD AUTO: 0.82 THOUSAND/ΜL (ref 0.17–1.22)
MONOCYTES NFR BLD AUTO: 8 % (ref 4–12)
NEUTROPHILS # BLD AUTO: 6.88 THOUSANDS/ΜL (ref 1.85–7.62)
NEUTS SEG NFR BLD AUTO: 72 % (ref 43–75)
NONHDLC SERPL-MCNC: 76 MG/DL
NRBC BLD AUTO-RTO: 0 /100 WBCS
PLATELET # BLD AUTO: 406 THOUSANDS/UL (ref 149–390)
PMV BLD AUTO: 9.7 FL (ref 8.9–12.7)
POTASSIUM SERPL-SCNC: 4.2 MMOL/L (ref 3.5–5.3)
PROT SERPL-MCNC: 7.3 G/DL (ref 6.4–8.2)
RBC # BLD AUTO: 4.82 MILLION/UL (ref 3.88–5.62)
SODIUM SERPL-SCNC: 139 MMOL/L (ref 136–145)
TRIGL SERPL-MCNC: 90 MG/DL
URATE SERPL-MCNC: 8.2 MG/DL (ref 4.2–8)
WBC # BLD AUTO: 9.76 THOUSAND/UL (ref 4.31–10.16)

## 2021-05-04 PROCEDURE — 3044F HG A1C LEVEL LT 7.0%: CPT | Performed by: FAMILY MEDICINE

## 2021-05-04 PROCEDURE — 85025 COMPLETE CBC W/AUTO DIFF WBC: CPT | Performed by: FAMILY MEDICINE

## 2021-05-04 PROCEDURE — 80053 COMPREHEN METABOLIC PANEL: CPT | Performed by: FAMILY MEDICINE

## 2021-05-04 PROCEDURE — 83036 HEMOGLOBIN GLYCOSYLATED A1C: CPT | Performed by: FAMILY MEDICINE

## 2021-05-04 PROCEDURE — 82043 UR ALBUMIN QUANTITATIVE: CPT | Performed by: FAMILY MEDICINE

## 2021-05-04 PROCEDURE — 84443 ASSAY THYROID STIM HORMONE: CPT | Performed by: FAMILY MEDICINE

## 2021-05-04 PROCEDURE — 36415 COLL VENOUS BLD VENIPUNCTURE: CPT | Performed by: FAMILY MEDICINE

## 2021-05-04 PROCEDURE — 3060F POS MICROALBUMINURIA REV: CPT | Performed by: FAMILY MEDICINE

## 2021-05-04 PROCEDURE — 80061 LIPID PANEL: CPT | Performed by: FAMILY MEDICINE

## 2021-05-04 PROCEDURE — 82570 ASSAY OF URINE CREATININE: CPT | Performed by: FAMILY MEDICINE

## 2021-05-04 PROCEDURE — 84550 ASSAY OF BLOOD/URIC ACID: CPT

## 2021-05-06 ENCOUNTER — TELEMEDICINE (OUTPATIENT)
Dept: FAMILY MEDICINE CLINIC | Facility: CLINIC | Age: 76
End: 2021-05-06
Payer: COMMERCIAL

## 2021-05-06 DIAGNOSIS — M51.36 LUMBAR DEGENERATIVE DISC DISEASE: ICD-10-CM

## 2021-05-06 DIAGNOSIS — E11.29 TYPE 2 DIABETES MELLITUS WITH MICROALBUMINURIA, WITHOUT LONG-TERM CURRENT USE OF INSULIN (HCC): Primary | ICD-10-CM

## 2021-05-06 DIAGNOSIS — R80.9 TYPE 2 DIABETES MELLITUS WITH MICROALBUMINURIA, WITHOUT LONG-TERM CURRENT USE OF INSULIN (HCC): Primary | ICD-10-CM

## 2021-05-06 DIAGNOSIS — I47.1 PAROXYSMAL SUPRAVENTRICULAR TACHYCARDIA (HCC): ICD-10-CM

## 2021-05-06 DIAGNOSIS — D75.839 THROMBOCYTOSIS: ICD-10-CM

## 2021-05-06 DIAGNOSIS — E78.2 MIXED HYPERLIPIDEMIA: ICD-10-CM

## 2021-05-06 DIAGNOSIS — I25.10 CORONARY ARTERY DISEASE INVOLVING NATIVE CORONARY ARTERY OF NATIVE HEART WITHOUT ANGINA PECTORIS: ICD-10-CM

## 2021-05-06 DIAGNOSIS — E79.0 HYPERURICEMIA: ICD-10-CM

## 2021-05-06 DIAGNOSIS — I10 ESSENTIAL HYPERTENSION: ICD-10-CM

## 2021-05-06 DIAGNOSIS — E83.52 HYPERCALCEMIA: ICD-10-CM

## 2021-05-06 DIAGNOSIS — E03.8 SUBCLINICAL HYPOTHYROIDISM: ICD-10-CM

## 2021-05-06 DIAGNOSIS — J44.9 CHRONIC OBSTRUCTIVE PULMONARY DISEASE, UNSPECIFIED COPD TYPE (HCC): ICD-10-CM

## 2021-05-06 PROBLEM — M51.369 LUMBAR DEGENERATIVE DISC DISEASE: Status: ACTIVE | Noted: 2021-05-06

## 2021-05-06 LAB — TSH SERPL DL<=0.05 MIU/L-ACNC: 1.29 UIU/ML (ref 0.36–3.74)

## 2021-05-06 PROCEDURE — 1036F TOBACCO NON-USER: CPT | Performed by: FAMILY MEDICINE

## 2021-05-06 PROCEDURE — 3725F SCREEN DEPRESSION PERFORMED: CPT | Performed by: FAMILY MEDICINE

## 2021-05-06 PROCEDURE — 1160F RVW MEDS BY RX/DR IN RCRD: CPT | Performed by: FAMILY MEDICINE

## 2021-05-06 PROCEDURE — 99214 OFFICE O/P EST MOD 30 MIN: CPT | Performed by: FAMILY MEDICINE

## 2021-05-06 PROCEDURE — 3066F NEPHROPATHY DOC TX: CPT | Performed by: FAMILY MEDICINE

## 2021-05-06 NOTE — PROGRESS NOTES
Virtual Regular Visit      Assessment/Plan:    Problem List Items Addressed This Visit        Endocrine    Type 2 diabetes mellitus with microalbuminuria, without long-term current use of insulin (HCC) - Primary       Respiratory    COPD (chronic obstructive pulmonary disease) ([facility] [address] )       Cardiovascular and Mediastinum    Hypertension    Paroxysmal supraventricular tachycardia (HCC)    Coronary artery disease involving native coronary artery of native heart without angina pectoris       Musculoskeletal and Integument    Lumbar degenerative disc disease       Other    Hyperlipidemia    Hyperuricemia      Other Visit Diagnoses     Hypercalcemia        Relevant Orders    Basic metabolic panel    Protein electrophoresis, serum    Thrombocytosis (HCC)        Relevant Orders    CBC and differential    Subclinical hypothyroidism        Relevant Orders    TSH, 3rd generation with Free T4 reflex        Continue with current medications  Try to limit NSAID usage  He is not interested in starting Allopurinol  Recheck calcium and platelet count in 4 weeks- I included an SPEP  COVID-19 vaccine refused  Follow-up office visit 6 months  He is due for an eye exam     BMI Counseling: There is no height or weight on file to calculate BMI  The BMI is above normal  Nutrition recommendations include reducing portion sizes, decreasing overall calorie intake, consuming healthier snacks, moderation in carbohydrate intake, reducing intake of saturated fat and trans fat and reducing intake of cholesterol         Reason for visit is   Chief Complaint   Patient presents with    Virtual Regular Visit        Encounter provider Collette Soles, MD    Provider located at [place] [address]  [phone]      Recent Visits  Date Type Provider Dept   05/03/21 Telephone Paul Edgar [facility] recent visits within past 7 days and meeting all other requirements     Today's Visits  Date Type Provider Dept   05/06/21 Telemedicine Chad Coffey MD [facility]   Showing today's visits and meeting all other requirements     Future Appointments  No visits were found meeting these conditions  Showing future appointments within next 150 days and meeting all other requirements        The patient was identified by name and date of birth  Balwinder Velazquez was informed that this is a telemedicine visit and that the visit is being conducted through [address] [place] [address] Road [employer] and patient was informed that this is a secure, HIPAA-compliant platform  He agrees to proceed     My office door was closed  No one else was in the room  He acknowledged consent and understanding of privacy and security of the video platform  The patient has agreed to participate and understands they can discontinue the visit at any time  Patient is aware this is a billable service  Subjective  Balwinder Velazquez is a 70 y o  male    Telemedicine visit  Daughter present  Medications reviewed  Labs 04/2021 see note Type 2 DM  on Metformin 1,000 mg BID  A1c 6 1  Urine micro albumin 113 increased from 22 4  On ARB  No hypoglycemic events  No DPN  Eye exam 09/2018  Hypertension blood pressures have been stable on Losartan 50 mg daily and Metoprolol ER 25 mg daily  Creatinine 1 20 increased from 0 93  Electrolytes normal except for corrected Ca++ for an albumin 3 3= 10 3  Hgb 13 8  Hyperlipidemia on Simvastatin 40 mg daily  Lipid profile cholesterol 116  Triglycerides 90  HDL 40  LDL 58  LFTs normal  02/2020 TSH 4 120 normal free T4 at 0 95        Recent Results (from the past 672 hour(s))   CBC and differential    Collection Time: 05/04/21 10:51 AM   Result Value Ref Range    WBC 9 76 4 31 - 10 16 Thousand/uL    RBC 4 82 3 88 - 5 62 Million/uL    Hemoglobin 13 8 12 0 - 17 0 g/dL    Hematocrit 43 0 36 5 - 49 3 %    MCV 89 82 - 98 fL    MCH 28 6 26 8 - 34 3 pg    MCHC 32 1 31 4 - 37 4 g/dL    RDW 12 7 11 6 - 15 1 %    MPV 9 7 8 9 - 12 7 fL    Platelets 815 (H) 661 - 390 Thousands/uL    nRBC 0 /100 WBCs    Neutrophils Relative 72 43 - 75 %    Immat GRANS % 0 0 - 2 %    Lymphocytes Relative 20 14 - 44 %    Monocytes Relative 8 4 - 12 %    Eosinophils Relative 0 0 - 6 %    Basophils Relative 0 0 - 1 %    Neutrophils Absolute 6 88 1 85 - 7 62 Thousands/µL    Immature Grans Absolute 0 03 0 00 - 0 20 Thousand/uL    Lymphocytes Absolute 1 96 0 60 - 4 47 Thousands/µL    Monocytes Absolute 0 82 0 17 - 1 22 Thousand/µL    Eosinophils Absolute 0 03 0 00 - 0 61 Thousand/µL    Basophils Absolute 0 04 0 00 - 0 10 Thousands/µL   Comprehensive metabolic panel    Collection Time: 05/04/21 10:51 AM   Result Value Ref Range    Sodium 139 136 - 145 mmol/L    Potassium 4 2 3 5 - 5 3 mmol/L    Chloride 107 100 - 108 mmol/L    CO2 26 21 - 32 mmol/L    ANION GAP 6 4 - 13 mmol/L    BUN 35 (H) 5 - 25 mg/dL    Creatinine 1 20 0 60 - 1 30 mg/dL    Glucose 102 65 - 140 mg/dL    Calcium 9 7 8 3 - 10 1 mg/dL    Corrected Calcium 10 3 (H) 8 3 - 10 1 mg/dL    AST 19 5 - 45 U/L    ALT 31 12 - 78 U/L    Alkaline Phosphatase 95 46 - 116 U/L    Total Protein 7 3 6 4 - 8 2 g/dL    Albumin 3 3 (L) 3 5 - 5 0 g/dL    Total Bilirubin 0 65 0 20 - 1 00 mg/dL    eGFR 60 ml/min/1 73sq m   Hemoglobin A1C    Collection Time: 05/04/21 10:51 AM   Result Value Ref Range    Hemoglobin A1C 6 1 (H) Normal 3 8-5 6%; PreDiabetic 5 7-6 4%; Diabetic >=6 5%; Glycemic control for adults with diabetes <7 0% %     mg/dl   Lipid panel    Collection Time: 05/04/21 10:51 AM   Result Value Ref Range    Cholesterol 116 50 - 200 mg/dL    Triglycerides 90 <=150 mg/dL    HDL, Direct 40 >=40 mg/dL    LDL Calculated 58 0 - 100 mg/dL    Non-HDL-Chol (CHOL-HDL) 76 mg/dl   Microalbumin / creatinine urine ratio    Collection Time: 05/04/21 10:51 AM   Result Value Ref Range    Creatinine, Ur 295 0 mg/dL    Microalbum  ,U,Random 113 0 (H) 0 0 - 20 0 mg/L    Microalb Creat Ratio 38 (H) 0 - 30 mg/g creatinine Uric acid    Collection Time: 05/04/21 10:51 AM   Result Value Ref Range    Uric Acid 8 2 (H) 4 2 - 8 0 mg/dL     Procedure: Xr Spine Lumbar Minimum 4 Views Non Injury    Result Date: 5/3/2021  Narrative: LUMBAR SPINE INDICATION:   M54 5: Low back pain  COMPARISON:  None VIEWS:  XR SPINE LUMBAR MINIMUM 4 VIEWS NON INJURY FINDINGS: There are 5 non rib bearing lumbar vertebral bodies  There is no evidence of acute fracture or destructive osseous lesion  Mild scoliotic deformity is noted  Alignment is otherwise unremarkable  Age-appropriate lumbar degenerative changes are seen  The pedicles appear intact  There are atherosclerotic calcifications  Soft tissues are otherwise unremarkable  Impression: No acute osseous abnormality  Degenerative changes as described  Workstation performed: [id number]     Procedure: Xr Foot 3+ Vw Right    Result Date: 5/3/2021  Narrative: RIGHT FOOT INDICATION:   R60 0: Localized edema  COMPARISON:  None VIEWS:  XR FOOT 3+ VW RIGHT FINDINGS: There is no acute fracture or dislocation  No significant degenerative changes  No lytic or blastic osseous lesion  Soft tissue calcification adjacent to the head of the 1st metatarsal      Impression: No acute osseous abnormality   Workstation performed: [id number]           Past Medical History:   Diagnosis Date    Angina pectoris ([facility] [address] )     "in the past"    Basal cell carcinoma     Resolved 8/25/2017     Colitis 11/22/2017    COPD (chronic obstructive pulmonary disease) ([facility] [address] )     Diabetes mellitus ([facility] [address] )     NIDDM    Hyperlipidemia     Hypertension     Malignant melanoma ([facility] [address] ) 2/24/2020       Past Surgical History:   Procedure Laterality Date    CARDIAC SURGERY  2014    cardiac cath    COLONOSCOPY      HAND SURGERY Left     KNEE ARTHROSCOPY Right     NECK SURGERY      NV EXC SKIN MALIG >4 CM FACE,FACIAL N/A 4/29/2016    Procedure: LEFT CHEEK BCC EXCISION; FROZEN SECTION; BILATERAL LOWER EYELIDS SUSPICIOUS LESION EXCISION; LEFT CHEEK EXTRACTION OF COMEDONES X 2;  Surgeon: Lyubov Galindo MD;  [facility]: [facility];  Service: Plastics    W[place] EXC SKIN MALIG >4 CM TRUNK,ARM,LEG Left 5/30/2019    Procedure: EXCISION WIDE LESION TRUNK/ABDOMEN/BACK;  Surgeon: Filipe Fernando MD;  Location: [facility];  Service: General       Current Outpatient Medications   Medication Sig Dispense Refill    aspirin 81 MG tablet Take 81 mg by mouth daily   fluticasone-vilanterol (Breo Ellipta) 100-25 mcg/inh inhaler USE 1 INHALATION BY MOUTH  DAILY 180 each 3    losartan (COZAAR) 50 mg tablet Take 1 tablet (50 mg total) by mouth daily for 90 days 90 tablet 3    metFORMIN (GLUCOPHAGE) 1000 MG tablet TAKE 1 TABLET BY MOUTH 2  TIMES A DAY WITH MEALS 180 tablet 3    metoprolol succinate (TOPROL-XL) 25 mg 24 hr tablet Take 1 tablet (25 mg total) by mouth daily 90 tablet 3    simvastatin (ZOCOR) 40 mg tablet Take 1 tablet (40 mg total) by mouth daily at bedtime for 90 days 90 tablet 3     No current facility-administered medications for this visit  No Known Allergies    Review of Systems   Constitutional: Negative for appetite change, chills, fatigue, fever and unexpected weight change  Eyes: Negative for visual disturbance  Respiratory: Negative for cough, shortness of breath and wheezing  Ex-smoker  COPD on Breo daily  Exertional dyspnea no changes  No orthopnea or PND   Cardiovascular: Negative for chest pain, palpitations and leg swelling  History of nonischemic cardiomyopathy followed by Cardiology  02/2020 echocardiogram mildly reduced left ventricular systolic function EF 58%  Mild global hypokinesis  Mildly dilated left atrium  Mildly dilated right atrium  Mildly enlarged ascending aorta  No significant valve abnormalities  Compared to prior study 2018 overall left ventricular systolic function similar  08/2014 cardiac catheterization normal   11/2018 echocardiogram overall normal left ventricular systolic function  EF 55%  Normal right ventricle  Aortic sclerosis  Mild diastolic dysfunction  11/2017 echocardiogram mildly dilated left ventricle  Mildly reduced left ventricular systolic function  Possible inferoseptal as well as inferolateral wall motion abnormalities  Estimated EF 45%  Right ventricle normal   No significant valvular disease  12/2013 stress echocardiogram negative for ischemia  Abnormal septal motion/septal hypokinesis as well as global hypokinesis  EF 40%  Gastrointestinal: Negative for abdominal pain, blood in stool, constipation, diarrhea, nausea and vomiting  11/2017 ER visit for abdominal pain CT scan abdomen pelvis with contrast showed suggestion of mild circumferential diffuse colonic wall thickening  Indeterminate hypodense lesion lower pole left kidney 1 3 cm  No lymphadenopathy  12/2017 renal ultrasound bilateral renal cyst   Abnormality seen on CT scan corresponds to cyst with single thin septation   Endocrine: Negative for polydipsia and polyuria  Subclinical hypothyroidism not on medication       Component                Value               Date                       [id number]             4 120 (H)           02/10/2020               Genitourinary: Negative for difficulty urinating  Musculoskeletal: Positive for arthralgias and back pain  Negative for myalgias  Patient was seen at urgent care this month for back pain and right foot pain x-rays of lower back showed degenerative disc disease with atherosclerotic calcification aorta  Right foot x-ray normal except for soft tissue calcification adjacent to the head of the 1st metatarsal History of gout  05/2021 uric acid 8 2  He has been using ibuprofen as needed for pain   Skin: Negative for rash  Status post resection BCC left cheek and left neck  05/2019 s/p resection SCC in situ left lower back  Allergic/Immunologic: Negative for environmental allergies     Neurological: Negative for dizziness and headaches  Hematological: Negative for adenopathy  Does not bruise/bleed easily  Lab Results       Component                Value               Date                       WBC                      9 76                05/04/2021                 HGB                      13 8                05/04/2021                 HCT                      43 0                05/04/2021                 MCV                      89                  05/04/2021                 PLT                      406 (H)             05/04/2021              Platelets       Date                     Value               Ref Range           Status                05/04/2021               406 (H)             149 - 390 Thou*     Final                 01/21/2021               307                 149 - 390 Thou*     Final                 02/10/2020               299                 149 - 390 Thou*     Final                 Psychiatric/Behavioral: Negative for dysphoric mood and sleep disturbance  Video Exam    There were no vitals filed for this visit  BP Readings from Last 3 Encounters:   05/03/21 133/66   02/24/20 124/70   08/21/19 112/60     Wt Readings from Last 3 Encounters:   02/24/20 80 3 kg (177 lb)   08/21/19 80 7 kg (178 lb)   05/30/19 83 9 kg (185 lb)     BMI Readings from Last 3 Encounters:   02/24/20 26 14 kg/m²   08/21/19 26 29 kg/m²   05/30/19 28 13 kg/m²           Physical Exam     I spent 17 minutes directly with the patient during this visit      VIRTUAL VISIT DISCLAIMER    Balwinder Velazquez acknowledges that he has consented to an online visit or consultation  He understands that the online visit is based solely on information provided by him, and that, in the absence of a face-to-face physical evaluation by the physician, the diagnosis he receives is both limited and provisional in terms of accuracy and completeness  This is not intended to replace a full medical face-to-face evaluation by the physician   Balwinder Velazquez understands and accepts these terms

## 2021-05-26 ENCOUNTER — LAB (OUTPATIENT)
Dept: LAB | Age: 76
End: 2021-05-26
Payer: COMMERCIAL

## 2021-05-26 DIAGNOSIS — D75.839 THROMBOCYTOSIS: Primary | ICD-10-CM

## 2021-05-26 DIAGNOSIS — E83.52 HYPERCALCEMIA: ICD-10-CM

## 2021-05-26 LAB
ANION GAP SERPL CALCULATED.3IONS-SCNC: 6 MMOL/L (ref 4–13)
BASOPHILS # BLD AUTO: 0.03 THOUSANDS/ΜL (ref 0–0.1)
BASOPHILS NFR BLD AUTO: 0 % (ref 0–1)
BUN SERPL-MCNC: 16 MG/DL (ref 5–25)
CALCIUM SERPL-MCNC: 9.7 MG/DL (ref 8.3–10.1)
CHLORIDE SERPL-SCNC: 110 MMOL/L (ref 100–108)
CO2 SERPL-SCNC: 26 MMOL/L (ref 21–32)
CREAT SERPL-MCNC: 0.93 MG/DL (ref 0.6–1.3)
EOSINOPHIL # BLD AUTO: 0.05 THOUSAND/ΜL (ref 0–0.61)
EOSINOPHIL NFR BLD AUTO: 0 % (ref 0–6)
ERYTHROCYTE [DISTWIDTH] IN BLOOD BY AUTOMATED COUNT: 12.9 % (ref 11.6–15.1)
GFR SERPL CREATININE-BSD FRML MDRD: 81 ML/MIN/1.73SQ M
GLUCOSE P FAST SERPL-MCNC: 88 MG/DL (ref 65–99)
HCT VFR BLD AUTO: 44.9 % (ref 36.5–49.3)
HGB BLD-MCNC: 14.3 G/DL (ref 12–17)
IMM GRANULOCYTES # BLD AUTO: 0.07 THOUSAND/UL (ref 0–0.2)
IMM GRANULOCYTES NFR BLD AUTO: 1 % (ref 0–2)
LYMPHOCYTES # BLD AUTO: 2.36 THOUSANDS/ΜL (ref 0.6–4.47)
LYMPHOCYTES NFR BLD AUTO: 17 % (ref 14–44)
MCH RBC QN AUTO: 28.5 PG (ref 26.8–34.3)
MCHC RBC AUTO-ENTMCNC: 31.8 G/DL (ref 31.4–37.4)
MCV RBC AUTO: 89 FL (ref 82–98)
MONOCYTES # BLD AUTO: 0.92 THOUSAND/ΜL (ref 0.17–1.22)
MONOCYTES NFR BLD AUTO: 7 % (ref 4–12)
NEUTROPHILS # BLD AUTO: 10.34 THOUSANDS/ΜL (ref 1.85–7.62)
NEUTS SEG NFR BLD AUTO: 75 % (ref 43–75)
NRBC BLD AUTO-RTO: 0 /100 WBCS
PLATELET # BLD AUTO: 393 THOUSANDS/UL (ref 149–390)
PMV BLD AUTO: 9.8 FL (ref 8.9–12.7)
POTASSIUM SERPL-SCNC: 4.4 MMOL/L (ref 3.5–5.3)
RBC # BLD AUTO: 5.02 MILLION/UL (ref 3.88–5.62)
SODIUM SERPL-SCNC: 142 MMOL/L (ref 136–145)
WBC # BLD AUTO: 13.77 THOUSAND/UL (ref 4.31–10.16)

## 2021-05-26 PROCEDURE — 85025 COMPLETE CBC W/AUTO DIFF WBC: CPT | Performed by: FAMILY MEDICINE

## 2021-05-26 PROCEDURE — 84165 PROTEIN E-PHORESIS SERUM: CPT | Performed by: PATHOLOGY

## 2021-05-26 PROCEDURE — 36415 COLL VENOUS BLD VENIPUNCTURE: CPT | Performed by: FAMILY MEDICINE

## 2021-05-26 PROCEDURE — 84165 PROTEIN E-PHORESIS SERUM: CPT | Performed by: FAMILY MEDICINE

## 2021-05-26 PROCEDURE — 80048 BASIC METABOLIC PNL TOTAL CA: CPT

## 2021-05-26 NOTE — RESULT ENCOUNTER NOTE
Maegan Alcazar   Your repeat CBC continues to show a mildly elevated platelet count improved from 3 weeks ago  Your white cell count is mildly elevated  No specific workup would be needed at this time I would recommend repeating this in 3 months       WBC       Date                     Value               Ref Range           Status                05/26/2021               13 77 (H)           4 31 - 10 16 T*     Final                 05/04/2021               9 76                4 31 - 10 16 T*     Final                 01/21/2021               6 73                4 31 - 10 16 T*     Final        Platelets       Date                     Value               Ref Range           Status                05/26/2021               393 (H)             149 - 390 Thou*     Final                 05/04/2021               406 (H)             149 - 390 Thou*     Final                 01/21/2021               307                 149 - 390 Thou*     Final

## 2021-05-27 ENCOUNTER — TELEPHONE (OUTPATIENT)
Dept: FAMILY MEDICINE CLINIC | Facility: CLINIC | Age: 76
End: 2021-05-27

## 2021-05-27 LAB
ALBUMIN SERPL ELPH-MCNC: 3.98 G/DL (ref 3.5–5)
ALBUMIN SERPL ELPH-MCNC: 58.5 % (ref 52–65)
ALPHA1 GLOB SERPL ELPH-MCNC: 0.4 G/DL (ref 0.1–0.4)
ALPHA1 GLOB SERPL ELPH-MCNC: 5.9 % (ref 2.5–5)
ALPHA2 GLOB SERPL ELPH-MCNC: 0.9 G/DL (ref 0.4–1.2)
ALPHA2 GLOB SERPL ELPH-MCNC: 13.2 % (ref 7–13)
BETA GLOB ABNORMAL SERPL ELPH-MCNC: 0.47 G/DL (ref 0.4–0.8)
BETA1 GLOB SERPL ELPH-MCNC: 6.9 % (ref 5–13)
BETA2 GLOB SERPL ELPH-MCNC: 5.4 % (ref 2–8)
BETA2+GAMMA GLOB SERPL ELPH-MCNC: 0.37 G/DL (ref 0.2–0.5)
GAMMA GLOB ABNORMAL SERPL ELPH-MCNC: 0.69 G/DL (ref 0.5–1.6)
GAMMA GLOB SERPL ELPH-MCNC: 10.1 % (ref 12–22)
IGG/ALB SER: 1.41 {RATIO} (ref 1.1–1.8)
PROT PATTERN SERPL ELPH-IMP: ABNORMAL
PROT SERPL-MCNC: 6.8 G/DL (ref 6.4–8.2)

## 2021-05-27 NOTE — TELEPHONE ENCOUNTER
Patient called for his lab results  I read him the message that Dr Killian Cuevas wrote  Patient would like a call back from clinical  He has questions about his elevated red and white blood count

## 2021-05-27 NOTE — RESULT ENCOUNTER NOTE
Krystina Joe your repeat creatinine or kidney function test is normal at 0 93 improved from 1 20  normal 0 6 to 1 30  no additional testing needed at this time

## 2021-05-28 NOTE — TELEPHONE ENCOUNTER
Explained in detail  blood work results to patient   Patient verbalized understanding and will repeat blood work in 3 months

## 2021-06-19 DIAGNOSIS — E11.29 TYPE 2 DIABETES MELLITUS WITH MICROALBUMINURIA, WITHOUT LONG-TERM CURRENT USE OF INSULIN (HCC): ICD-10-CM

## 2021-06-19 DIAGNOSIS — R80.9 TYPE 2 DIABETES MELLITUS WITH MICROALBUMINURIA, WITHOUT LONG-TERM CURRENT USE OF INSULIN (HCC): ICD-10-CM

## 2021-08-17 ENCOUNTER — VBI (OUTPATIENT)
Dept: ADMINISTRATIVE | Facility: OTHER | Age: 76
End: 2021-08-17

## 2021-09-13 ENCOUNTER — VBI (OUTPATIENT)
Dept: ADMINISTRATIVE | Facility: OTHER | Age: 76
End: 2021-09-13

## 2021-10-01 DIAGNOSIS — E11.29 TYPE 2 DIABETES MELLITUS WITH MICROALBUMINURIA, WITHOUT LONG-TERM CURRENT USE OF INSULIN (HCC): ICD-10-CM

## 2021-10-01 DIAGNOSIS — R80.9 TYPE 2 DIABETES MELLITUS WITH MICROALBUMINURIA, WITHOUT LONG-TERM CURRENT USE OF INSULIN (HCC): ICD-10-CM

## 2021-10-06 DIAGNOSIS — R80.9 TYPE 2 DIABETES MELLITUS WITH MICROALBUMINURIA, WITHOUT LONG-TERM CURRENT USE OF INSULIN (HCC): ICD-10-CM

## 2021-10-06 DIAGNOSIS — E11.29 TYPE 2 DIABETES MELLITUS WITH MICROALBUMINURIA, WITHOUT LONG-TERM CURRENT USE OF INSULIN (HCC): ICD-10-CM

## 2021-11-09 ENCOUNTER — OFFICE VISIT (OUTPATIENT)
Dept: FAMILY MEDICINE CLINIC | Facility: CLINIC | Age: 76
End: 2021-11-09
Payer: COMMERCIAL

## 2021-11-09 VITALS
BODY MASS INDEX: 21.77 KG/M2 | TEMPERATURE: 96.7 F | HEIGHT: 69 IN | WEIGHT: 147 LBS | DIASTOLIC BLOOD PRESSURE: 64 MMHG | RESPIRATION RATE: 16 BRPM | HEART RATE: 84 BPM | SYSTOLIC BLOOD PRESSURE: 112 MMHG

## 2021-11-09 DIAGNOSIS — R63.4 WEIGHT LOSS: Primary | ICD-10-CM

## 2021-11-09 DIAGNOSIS — E11.29 TYPE 2 DIABETES MELLITUS WITH MICROALBUMINURIA, WITHOUT LONG-TERM CURRENT USE OF INSULIN (HCC): ICD-10-CM

## 2021-11-09 DIAGNOSIS — D75.839 THROMBOCYTOSIS: ICD-10-CM

## 2021-11-09 DIAGNOSIS — R80.9 TYPE 2 DIABETES MELLITUS WITH MICROALBUMINURIA, WITHOUT LONG-TERM CURRENT USE OF INSULIN (HCC): ICD-10-CM

## 2021-11-09 DIAGNOSIS — I10 PRIMARY HYPERTENSION: ICD-10-CM

## 2021-11-09 DIAGNOSIS — N28.1 BILATERAL RENAL CYSTS: ICD-10-CM

## 2021-11-09 DIAGNOSIS — Z23 INFLUENZA VACCINE NEEDED: ICD-10-CM

## 2021-11-09 DIAGNOSIS — J44.9 CHRONIC OBSTRUCTIVE PULMONARY DISEASE, UNSPECIFIED COPD TYPE (HCC): ICD-10-CM

## 2021-11-09 DIAGNOSIS — M17.0 PRIMARY OSTEOARTHRITIS OF BOTH KNEES: ICD-10-CM

## 2021-11-09 DIAGNOSIS — I25.5 ISCHEMIC CARDIOMYOPATHY: ICD-10-CM

## 2021-11-09 DIAGNOSIS — E78.2 MIXED HYPERLIPIDEMIA: ICD-10-CM

## 2021-11-09 DIAGNOSIS — I47.1 PAROXYSMAL SUPRAVENTRICULAR TACHYCARDIA (HCC): ICD-10-CM

## 2021-11-09 DIAGNOSIS — I25.10 CORONARY ARTERY DISEASE INVOLVING NATIVE CORONARY ARTERY OF NATIVE HEART WITHOUT ANGINA PECTORIS: ICD-10-CM

## 2021-11-09 DIAGNOSIS — I50.22 CHF (CONGESTIVE HEART FAILURE), NYHA CLASS I, CHRONIC, SYSTOLIC (HCC): ICD-10-CM

## 2021-11-09 LAB — SL AMB POCT HEMOGLOBIN AIC: 6.4 (ref ?–6.5)

## 2021-11-09 PROCEDURE — 1100F PTFALLS ASSESS-DOCD GE2>/YR: CPT | Performed by: FAMILY MEDICINE

## 2021-11-09 PROCEDURE — 3066F NEPHROPATHY DOC TX: CPT | Performed by: FAMILY MEDICINE

## 2021-11-09 PROCEDURE — 83036 HEMOGLOBIN GLYCOSYLATED A1C: CPT | Performed by: FAMILY MEDICINE

## 2021-11-09 PROCEDURE — 90662 IIV NO PRSV INCREASED AG IM: CPT

## 2021-11-09 PROCEDURE — 3008F BODY MASS INDEX DOCD: CPT | Performed by: FAMILY MEDICINE

## 2021-11-09 PROCEDURE — 3288F FALL RISK ASSESSMENT DOCD: CPT | Performed by: FAMILY MEDICINE

## 2021-11-09 PROCEDURE — 3044F HG A1C LEVEL LT 7.0%: CPT | Performed by: FAMILY MEDICINE

## 2021-11-09 PROCEDURE — 99215 OFFICE O/P EST HI 40 MIN: CPT | Performed by: FAMILY MEDICINE

## 2021-11-09 PROCEDURE — G0008 ADMIN INFLUENZA VIRUS VAC: HCPCS

## 2021-11-09 PROCEDURE — 1160F RVW MEDS BY RX/DR IN RCRD: CPT | Performed by: FAMILY MEDICINE

## 2021-11-09 PROCEDURE — 1036F TOBACCO NON-USER: CPT | Performed by: FAMILY MEDICINE

## 2021-11-09 RX ORDER — SACUBITRIL AND VALSARTAN 49; 51 MG/1; MG/1
1 TABLET, FILM COATED ORAL 2 TIMES DAILY
COMMUNITY
Start: 2021-10-11

## 2021-11-09 RX ORDER — SPIRONOLACTONE 25 MG/1
12.5 TABLET ORAL DAILY
COMMUNITY
Start: 2021-11-08 | End: 2022-05-31 | Stop reason: ALTCHOICE

## 2021-11-09 RX ORDER — CARVEDILOL 6.25 MG/1
1 TABLET ORAL 2 TIMES DAILY WITH MEALS
COMMUNITY
Start: 2021-11-08 | End: 2022-06-01

## 2021-11-09 RX ORDER — NITROGLYCERIN 0.4 MG/1
TABLET SUBLINGUAL
COMMUNITY
Start: 2021-10-05

## 2021-11-12 ENCOUNTER — TELEPHONE (OUTPATIENT)
Dept: FAMILY MEDICINE CLINIC | Facility: CLINIC | Age: 76
End: 2021-11-12

## 2021-11-15 ENCOUNTER — OFFICE VISIT (OUTPATIENT)
Dept: URGENT CARE | Age: 76
End: 2021-11-15
Payer: COMMERCIAL

## 2021-11-15 ENCOUNTER — APPOINTMENT (OUTPATIENT)
Dept: RADIOLOGY | Age: 76
End: 2021-11-15
Payer: COMMERCIAL

## 2021-11-15 ENCOUNTER — APPOINTMENT (OUTPATIENT)
Dept: LAB | Facility: CLINIC | Age: 76
End: 2021-11-15
Payer: COMMERCIAL

## 2021-11-15 VITALS
BODY MASS INDEX: 21.33 KG/M2 | HEIGHT: 69 IN | TEMPERATURE: 97.7 F | WEIGHT: 144 LBS | HEART RATE: 83 BPM | DIASTOLIC BLOOD PRESSURE: 90 MMHG | OXYGEN SATURATION: 100 % | RESPIRATION RATE: 16 BRPM | SYSTOLIC BLOOD PRESSURE: 121 MMHG

## 2021-11-15 DIAGNOSIS — M17.0 PRIMARY OSTEOARTHRITIS OF BOTH KNEES: ICD-10-CM

## 2021-11-15 DIAGNOSIS — M79.89 SWELLING OF RIGHT HAND: ICD-10-CM

## 2021-11-15 DIAGNOSIS — M79.89 SWELLING OF RIGHT HAND: Primary | ICD-10-CM

## 2021-11-15 LAB
ALBUMIN SERPL BCP-MCNC: 3 G/DL (ref 3.5–5)
ALP SERPL-CCNC: 87 U/L (ref 46–116)
ALT SERPL W P-5'-P-CCNC: 14 U/L (ref 12–78)
ANION GAP SERPL CALCULATED.3IONS-SCNC: 6 MMOL/L (ref 4–13)
AST SERPL W P-5'-P-CCNC: 7 U/L (ref 5–45)
BASOPHILS # BLD AUTO: 0.04 THOUSANDS/ΜL (ref 0–0.1)
BASOPHILS NFR BLD AUTO: 1 % (ref 0–1)
BILIRUB SERPL-MCNC: 0.41 MG/DL (ref 0.2–1)
BUN SERPL-MCNC: 15 MG/DL (ref 5–25)
CALCIUM ALBUM COR SERPL-MCNC: 10.4 MG/DL (ref 8.3–10.1)
CALCIUM SERPL-MCNC: 9.6 MG/DL (ref 8.3–10.1)
CHLORIDE SERPL-SCNC: 104 MMOL/L (ref 100–108)
CHOLEST SERPL-MCNC: 120 MG/DL (ref 50–200)
CO2 SERPL-SCNC: 26 MMOL/L (ref 21–32)
CREAT SERPL-MCNC: 0.79 MG/DL (ref 0.6–1.3)
EOSINOPHIL # BLD AUTO: 0.11 THOUSAND/ΜL (ref 0–0.61)
EOSINOPHIL NFR BLD AUTO: 1 % (ref 0–6)
ERYTHROCYTE [DISTWIDTH] IN BLOOD BY AUTOMATED COUNT: 14.2 % (ref 11.6–15.1)
ERYTHROCYTE [SEDIMENTATION RATE] IN BLOOD: 34 MM/HOUR (ref 0–19)
GFR SERPL CREATININE-BSD FRML MDRD: 88 ML/MIN/1.73SQ M
GLUCOSE P FAST SERPL-MCNC: 118 MG/DL (ref 65–99)
HCT VFR BLD AUTO: 38.2 % (ref 36.5–49.3)
HDLC SERPL-MCNC: 37 MG/DL
HGB BLD-MCNC: 12 G/DL (ref 12–17)
IMM GRANULOCYTES # BLD AUTO: 0.02 THOUSAND/UL (ref 0–0.2)
IMM GRANULOCYTES NFR BLD AUTO: 0 % (ref 0–2)
LDLC SERPL CALC-MCNC: 68 MG/DL (ref 0–100)
LYMPHOCYTES # BLD AUTO: 2.29 THOUSANDS/ΜL (ref 0.6–4.47)
LYMPHOCYTES NFR BLD AUTO: 28 % (ref 14–44)
MAGNESIUM SERPL-MCNC: 2 MG/DL (ref 1.6–2.6)
MCH RBC QN AUTO: 26.8 PG (ref 26.8–34.3)
MCHC RBC AUTO-ENTMCNC: 31.4 G/DL (ref 31.4–37.4)
MCV RBC AUTO: 85 FL (ref 82–98)
MONOCYTES # BLD AUTO: 0.66 THOUSAND/ΜL (ref 0.17–1.22)
MONOCYTES NFR BLD AUTO: 8 % (ref 4–12)
NEUTROPHILS # BLD AUTO: 5.05 THOUSANDS/ΜL (ref 1.85–7.62)
NEUTS SEG NFR BLD AUTO: 62 % (ref 43–75)
NONHDLC SERPL-MCNC: 83 MG/DL
NRBC BLD AUTO-RTO: 0 /100 WBCS
PLATELET # BLD AUTO: 418 THOUSANDS/UL (ref 149–390)
PMV BLD AUTO: 9.2 FL (ref 8.9–12.7)
POTASSIUM SERPL-SCNC: 4.7 MMOL/L (ref 3.5–5.3)
PROT SERPL-MCNC: 7.2 G/DL (ref 6.4–8.2)
RBC # BLD AUTO: 4.48 MILLION/UL (ref 3.88–5.62)
SODIUM SERPL-SCNC: 136 MMOL/L (ref 136–145)
TRIGL SERPL-MCNC: 75 MG/DL
TSH SERPL DL<=0.05 MIU/L-ACNC: 1.44 UIU/ML (ref 0.36–3.74)
WBC # BLD AUTO: 8.17 THOUSAND/UL (ref 4.31–10.16)

## 2021-11-15 PROCEDURE — 85652 RBC SED RATE AUTOMATED: CPT

## 2021-11-15 PROCEDURE — 80053 COMPREHEN METABOLIC PANEL: CPT | Performed by: FAMILY MEDICINE

## 2021-11-15 PROCEDURE — 84443 ASSAY THYROID STIM HORMONE: CPT | Performed by: FAMILY MEDICINE

## 2021-11-15 PROCEDURE — 85025 COMPLETE CBC W/AUTO DIFF WBC: CPT | Performed by: FAMILY MEDICINE

## 2021-11-15 PROCEDURE — 99213 OFFICE O/P EST LOW 20 MIN: CPT | Performed by: NURSE PRACTITIONER

## 2021-11-15 PROCEDURE — 36415 COLL VENOUS BLD VENIPUNCTURE: CPT | Performed by: FAMILY MEDICINE

## 2021-11-15 PROCEDURE — 80061 LIPID PANEL: CPT | Performed by: FAMILY MEDICINE

## 2021-11-15 PROCEDURE — S9083 URGENT CARE CENTER GLOBAL: HCPCS | Performed by: NURSE PRACTITIONER

## 2021-11-15 PROCEDURE — 83735 ASSAY OF MAGNESIUM: CPT | Performed by: FAMILY MEDICINE

## 2021-11-15 PROCEDURE — 84165 PROTEIN E-PHORESIS SERUM: CPT | Performed by: PATHOLOGY

## 2021-11-15 PROCEDURE — 73130 X-RAY EXAM OF HAND: CPT

## 2021-11-16 ENCOUNTER — TELEPHONE (OUTPATIENT)
Dept: FAMILY MEDICINE CLINIC | Facility: CLINIC | Age: 76
End: 2021-11-16

## 2021-11-22 ENCOUNTER — APPOINTMENT (OUTPATIENT)
Dept: CT IMAGING | Facility: HOSPITAL | Age: 76
End: 2021-11-22
Payer: COMMERCIAL

## 2021-12-02 DIAGNOSIS — R63.4 WEIGHT LOSS, UNINTENTIONAL: Primary | ICD-10-CM

## 2021-12-04 ENCOUNTER — HOSPITAL ENCOUNTER (OUTPATIENT)
Dept: ULTRASOUND IMAGING | Facility: HOSPITAL | Age: 76
Discharge: HOME | End: 2021-12-04
Payer: COMMERCIAL

## 2021-12-04 DIAGNOSIS — R63.4 WEIGHT LOSS, UNINTENTIONAL: ICD-10-CM

## 2021-12-04 DIAGNOSIS — R63.0 ANOREXIA: ICD-10-CM

## 2021-12-04 PROCEDURE — 76700 US EXAM ABDOM COMPLETE: CPT

## 2021-12-08 ENCOUNTER — CONSULT (OUTPATIENT)
Dept: SURGERY | Facility: CLINIC | Age: 76
End: 2021-12-08
Payer: COMMERCIAL

## 2021-12-08 ENCOUNTER — PREP FOR PROCEDURE (OUTPATIENT)
Dept: SURGERY | Facility: CLINIC | Age: 76
End: 2021-12-08

## 2021-12-08 VITALS — HEIGHT: 68 IN | WEIGHT: 140 LBS | BODY MASS INDEX: 21.22 KG/M2

## 2021-12-08 DIAGNOSIS — R10.9 ABDOMINAL PAIN: Primary | ICD-10-CM

## 2021-12-08 DIAGNOSIS — R63.4 WEIGHT LOSS: Primary | ICD-10-CM

## 2021-12-08 DIAGNOSIS — R63.4 WEIGHT LOSS: ICD-10-CM

## 2021-12-08 PROCEDURE — 99202 OFFICE O/P NEW SF 15 MIN: CPT | Performed by: SURGERY

## 2021-12-08 PROCEDURE — 1036F TOBACCO NON-USER: CPT | Performed by: SURGERY

## 2021-12-08 PROCEDURE — 3008F BODY MASS INDEX DOCD: CPT | Performed by: SURGERY

## 2021-12-08 RX ORDER — FUROSEMIDE 20 MG/1
TABLET ORAL
COMMUNITY
Start: 2021-11-15 | End: 2022-05-31 | Stop reason: ALTCHOICE

## 2021-12-09 ENCOUNTER — TELEPHONE (OUTPATIENT)
Dept: FAMILY MEDICINE CLINIC | Facility: CLINIC | Age: 76
End: 2021-12-09

## 2021-12-10 ENCOUNTER — VBI (OUTPATIENT)
Dept: ADMINISTRATIVE | Facility: OTHER | Age: 76
End: 2021-12-10

## 2021-12-14 ENCOUNTER — TELEPHONE (OUTPATIENT)
Dept: FAMILY MEDICINE CLINIC | Facility: CLINIC | Age: 76
End: 2021-12-14

## 2021-12-17 ENCOUNTER — TELEPHONE (OUTPATIENT)
Dept: GASTROENTEROLOGY | Facility: HOSPITAL | Age: 76
End: 2021-12-17

## 2021-12-19 ENCOUNTER — ANESTHESIA (OUTPATIENT)
Dept: ANESTHESIOLOGY | Facility: HOSPITAL | Age: 76
End: 2021-12-19

## 2021-12-19 ENCOUNTER — ANESTHESIA EVENT (OUTPATIENT)
Dept: ANESTHESIOLOGY | Facility: HOSPITAL | Age: 76
End: 2021-12-19

## 2021-12-20 ENCOUNTER — ANESTHESIA (OUTPATIENT)
Dept: GASTROENTEROLOGY | Facility: HOSPITAL | Age: 76
End: 2021-12-20

## 2021-12-20 ENCOUNTER — ANESTHESIA EVENT (OUTPATIENT)
Dept: GASTROENTEROLOGY | Facility: HOSPITAL | Age: 76
End: 2021-12-20

## 2021-12-20 ENCOUNTER — HOSPITAL ENCOUNTER (OUTPATIENT)
Dept: GASTROENTEROLOGY | Facility: HOSPITAL | Age: 76
Setting detail: OUTPATIENT SURGERY
Discharge: HOME | End: 2021-12-20
Attending: SURGERY | Admitting: SURGERY
Payer: COMMERCIAL

## 2021-12-20 VITALS
RESPIRATION RATE: 18 BRPM | OXYGEN SATURATION: 97 % | SYSTOLIC BLOOD PRESSURE: 119 MMHG | TEMPERATURE: 97.1 F | DIASTOLIC BLOOD PRESSURE: 70 MMHG | HEART RATE: 91 BPM

## 2021-12-20 DIAGNOSIS — R63.4 WEIGHT LOSS: ICD-10-CM

## 2021-12-20 PROCEDURE — 43239 EGD BIOPSY SINGLE/MULTIPLE: CPT | Performed by: SURGERY

## 2021-12-20 PROCEDURE — 88305 TISSUE EXAM BY PATHOLOGIST: CPT | Performed by: PATHOLOGY

## 2021-12-20 PROCEDURE — 45385 COLONOSCOPY W/LESION REMOVAL: CPT | Performed by: SURGERY

## 2021-12-20 RX ORDER — PROPOFOL 10 MG/ML
INJECTION, EMULSION INTRAVENOUS AS NEEDED
Status: DISCONTINUED | OUTPATIENT
Start: 2021-12-20 | End: 2021-12-20

## 2021-12-20 RX ORDER — SODIUM CHLORIDE 9 MG/ML
INJECTION, SOLUTION INTRAVENOUS CONTINUOUS PRN
Status: DISCONTINUED | OUTPATIENT
Start: 2021-12-20 | End: 2021-12-20

## 2021-12-20 RX ORDER — PROPOFOL 10 MG/ML
INJECTION, EMULSION INTRAVENOUS CONTINUOUS PRN
Status: DISCONTINUED | OUTPATIENT
Start: 2021-12-20 | End: 2021-12-20

## 2021-12-20 RX ORDER — EPHEDRINE SULFATE 50 MG/ML
INJECTION INTRAVENOUS AS NEEDED
Status: DISCONTINUED | OUTPATIENT
Start: 2021-12-20 | End: 2021-12-20

## 2021-12-20 RX ORDER — LIDOCAINE HYDROCHLORIDE 10 MG/ML
INJECTION, SOLUTION EPIDURAL; INFILTRATION; INTRACAUDAL; PERINEURAL AS NEEDED
Status: DISCONTINUED | OUTPATIENT
Start: 2021-12-20 | End: 2021-12-20

## 2021-12-20 RX ORDER — FENTANYL CITRATE 50 UG/ML
INJECTION, SOLUTION INTRAMUSCULAR; INTRAVENOUS AS NEEDED
Status: DISCONTINUED | OUTPATIENT
Start: 2021-12-20 | End: 2021-12-20

## 2021-12-20 RX ADMIN — FENTANYL CITRATE 25 MCG: 50 INJECTION INTRAMUSCULAR; INTRAVENOUS at 09:20

## 2021-12-20 RX ADMIN — PROPOFOL 10 MG: 10 INJECTION, EMULSION INTRAVENOUS at 09:23

## 2021-12-20 RX ADMIN — SODIUM CHLORIDE: 0.9 INJECTION, SOLUTION INTRAVENOUS at 09:12

## 2021-12-20 RX ADMIN — PROPOFOL 30 MG: 10 INJECTION, EMULSION INTRAVENOUS at 09:20

## 2021-12-20 RX ADMIN — PROPOFOL 10 MG: 10 INJECTION, EMULSION INTRAVENOUS at 09:22

## 2021-12-20 RX ADMIN — PROPOFOL 10 MG: 10 INJECTION, EMULSION INTRAVENOUS at 09:24

## 2021-12-20 RX ADMIN — EPHEDRINE SULFATE 5 MG: 50 INJECTION, SOLUTION INTRAVENOUS at 09:32

## 2021-12-20 RX ADMIN — FENTANYL CITRATE 25 MCG: 50 INJECTION INTRAMUSCULAR; INTRAVENOUS at 09:23

## 2021-12-20 RX ADMIN — EPHEDRINE SULFATE 5 MG: 50 INJECTION, SOLUTION INTRAVENOUS at 09:36

## 2021-12-20 RX ADMIN — LIDOCAINE HYDROCHLORIDE 50 MG: 10 INJECTION, SOLUTION EPIDURAL; INFILTRATION; INTRACAUDAL; PERINEURAL at 09:20

## 2021-12-20 RX ADMIN — EPHEDRINE SULFATE 5 MG: 50 INJECTION, SOLUTION INTRAVENOUS at 09:21

## 2021-12-20 RX ADMIN — PROPOFOL 10 MG: 10 INJECTION, EMULSION INTRAVENOUS at 09:25

## 2021-12-20 RX ADMIN — PROPOFOL 10 MG: 10 INJECTION, EMULSION INTRAVENOUS at 09:28

## 2021-12-20 RX ADMIN — PROPOFOL 10 MG: 10 INJECTION, EMULSION INTRAVENOUS at 09:32

## 2021-12-20 RX ADMIN — PROPOFOL 100 MCG/KG/MIN: 10 INJECTION, EMULSION INTRAVENOUS at 09:20

## 2021-12-20 RX ADMIN — PROPOFOL 20 MG: 10 INJECTION, EMULSION INTRAVENOUS at 09:21

## 2021-12-23 ENCOUNTER — HOSPITAL ENCOUNTER (OUTPATIENT)
Dept: NON INVASIVE DIAGNOSTICS | Facility: CLINIC | Age: 76
Discharge: HOME | End: 2021-12-23
Payer: COMMERCIAL

## 2021-12-23 DIAGNOSIS — R10.9 ABDOMINAL PAIN: ICD-10-CM

## 2021-12-23 PROCEDURE — 93975 VASCULAR STUDY: CPT | Performed by: SURGERY

## 2021-12-23 PROCEDURE — 93975 VASCULAR STUDY: CPT

## 2022-01-06 DIAGNOSIS — R63.4 WEIGHT LOSS: ICD-10-CM

## 2022-01-06 DIAGNOSIS — R10.9 ABDOMINAL PAIN: Primary | ICD-10-CM

## 2022-01-06 NOTE — PROGRESS NOTES
Spoke to close by phone regarding his mesenteric duplex  Feel he would benefit from a vascular opinion  He is going for [employer] on January 22nd and the [facility]   He would like to do that 1st before going for a vascular opinion

## 2022-01-07 ENCOUNTER — TELEPHONE (OUTPATIENT)
Dept: FAMILY MEDICINE CLINIC | Facility: CLINIC | Age: 77
End: 2022-01-07

## 2022-01-07 PROBLEM — I77.1 CELIAC ARTERY STENOSIS (HCC): Status: ACTIVE | Noted: 2022-01-07

## 2022-01-07 PROBLEM — K55.1 SUPERIOR MESENTERIC ARTERY STENOSIS (HCC): Status: ACTIVE | Noted: 2022-01-07

## 2022-01-07 PROBLEM — I77.4 CELIAC ARTERY STENOSIS (HCC): Status: ACTIVE | Noted: 2022-01-07

## 2022-01-07 NOTE — TELEPHONE ENCOUNTER
Call patient-I reviewed note from Dr Mary Sewell re vascular surgery evaluation  Was a referral made? Dx celiac artery/superior mesenteric stenosis  Procedure: EGD    Result Date: 12/20/2021  Narrative: [address] [facility] [address] [address] [phone] [address]: 12/20/21 PHYSICIAN(S): Alfonza Nyhan, DO - Attending Physician INDICATION:  Skin mucosal changes at the GE junction  Weight loss POST-OP DIAGNOSIS: See the impression below  PREPROCEDURE: Informed consent was obtained for the procedure, including sedation  Risks of perforation, hemorrhage, adverse drug reaction and aspiration were discussed  The patient was placed in the left lateral decubitus position  Patient was explained about the risks and benefits of the procedure  Risks including but not limited to bleeding, infection, and perforation were explained in detail  Also explained about less than 100% sensitivity with the exam and other alternatives  DETAILS OF PROCEDURE: Patient was taken to the procedure room where a time out was performed to confirm correct patient and correct procedure  The patient underwent monitored anesthesia care, which was administered by an anesthesia professional  The patient's blood pressure, heart rate, level of consciousness, respirations and oxygen were monitored throughout the procedure  The scope was advanced to the second part of the duodenum  Retroflexion was performed in the fundus  The patient experienced no blood loss  The procedure was not difficult  The patient tolerated the procedure well  There were no apparent complications   ANESTHESIA INFORMATION: ASA: IV Anesthesia Type: IV Sedation with Anesthesia MEDICATIONS: No administrations occurring from 0912 to 0948 on 12/20/21 FINDINGS: Performed multiple biopsies to rule out Holly's esophagus in the GE junction Performed single biopsy to rule out H  pylori in the antrum SPECIMENS: ID Type Source Tests Collected by Time Destination 1 : antrum r/o h  pylori  Tissue Stomach TISSUE EXAM Livonia YashDO 12/20/2021 7381  2 :  Tissue Esophagogastric junction TISSUE EXAM Kimberly Berrios DO 12/20/2021 0785  3 : ascending colon polyp, hot snare  Tissue Polyp, Colorectal TISSUE EXAM [place] Yash,  12/20/2021 0217      Impression: Skin mucosal changes at the GE junction  Biopsies performed H pylori biopsy performed No masses nor ulcers RECOMMENDATION: Await pathology results  Kimberly Berrios DO     Procedure: Colonoscopy    Result Date: 12/20/2021  Narrative: [address] [place] [address] [phone] DATE OF SERVICE: 12/20/21 PHYSICIAN(S): Kimberly Berrios DO - Attending Physician INDICATION: Weight loss Colonoscopy performed for a diagnostic indication  POST-OP DIAGNOSIS: See the impression below  HISTORY: Prior colonoscopy: 5 years ago  BOWEL PREPARATION: Miralax/Dulcolax PREPROCEDURE: Informed consent was obtained for the procedure, including sedation  Risks including but not limited to bleeding, infection, perforation, adverse drug reaction and aspiration were explained in detail  Also explained about less than 100% sensitivity with the exam and other alternatives  The patient was placed in the left lateral decubitus position  DETAILS OF PROCEDURE: Patient was taken to the procedure room where a time out was performed to confirm correct patient and correct procedure  The patient underwent monitored anesthesia care, which was administered by an anesthesia professional  The patient's blood pressure, heart rate, level of consciousness, oxygen and respirations were monitored throughout the procedure  A digital rectal exam was performed  The scope was introduced through the anus and advanced to the cecum  Retroflexion was performed in the rectum   The quality of bowel preparation was evaluated using the [facility] Bowel Preparation Scale with scores of: right colon = 2, transverse colon = 2, left colon = 2  The total BBPS score was 6  Bowel prep was adequate  The patient experienced no blood loss  The procedure was not difficult  The patient tolerated the procedure well  There were no apparent complications  ANESTHESIA INFORMATION: ASA: IV Anesthesia Type: IV Sedation with Anesthesia MEDICATIONS: No administrations occurring from 0912 to 0950 on 12/20/21 FINDINGS: Pedunculated, benign-appearing polyp measuring 5-9 mm in the proximal ascending colon; completely removed en bloc by hot snare and retrieved specimen Few small, mild scattered diverticula containing no content in the mid sigmoid colon EVENTS: Procedure Events Event Event Time ENDO SCOPE OUT TIME 12/20/2021  9:28 AM ENDO CECUM REACHED 12/20/2021  9:39 AM ENDO SCOPE OUT TIME 12/20/2021  9:47 AM SPECIMENS: ID Type Source Tests Collected by Time Destination 1 : antrum r/o h  pylori  Tissue Stomach TISSUE EXAM Cristina Alvarenga,  12/20/2021 0926  2 :  Tissue Esophagogastric junction TISSUE EXAM Cristina Alvarenga,  12/20/2021 0610  3 : ascending colon polyp, hot snare  Tissue Polyp, Colorectal TISSUE EXAM Cristina Alvarenga DO 12/20/2021 2097  EQUIPMENT: Colonoscope -     Impression: Mild diverticulosis, sigmoid Single benign-appearing polyp of ascending colon RECOMMENDATION: Repeat colonoscopy in 3 years due to a personal history of colon polyps  Cristina Alvarenga DO     Procedure: VAS celiac and/or mesenteric duplex; complete study    Result Date: 12/23/2021  Narrative:  [facility] REPORT CLINICAL: Indications:  Abdominal Pain unspecified [R10 9]  Pt  complaining of losing weight for the past 3 months  States that he gets abdominal pain after eating  Risk Factors The patient has history of Diabetes (IDDM)  FINDINGS:  Unilateral             Impression  PSV  EDV  AP Diam  Sup-Zenia Ao                          83   17      2 8  Celiac                 >70%        673  191           Prox   SMA                          270   43           Common Hepatic Artery              147   35           Px Inf-Alexander Ao                       65    3      2 2  Ds Inf-Alexander Ao                       72    1      1 9  Splenic                             77   30            Segment     Rig                Left                    PSV  EDV  AP Diam  PSV  EDV  AP Diam  Prox Renal  160   26            83   17           Prox YOKASTA                  1 6                1 6     CONCLUSION: Impression The aorta is patent and ectatic measuring 2 8cm at its greatest diameter  >70% stenosis in the celiac artery  Velocities decrease with inspiration which may be suggestive of median arcuate ligament syndrome  Splenic and hepatic arteries are patent  There is a >70% stenosis in the superior mesenteric artery in a fasting state  The inferior mesenteric artery is normal and patent  SIGNATURE: Electronically Signed by: Koko Delaney MD on 2021-12-23 12:06:05 PM          ----- Message from Liana Wall DO sent at 1/6/2022  4:44 PM EST -----  Called with mesenteric vascular studies  Feel he would benefit from a vascular opinion

## 2022-03-04 DIAGNOSIS — M17.0 PRIMARY OSTEOARTHRITIS OF BOTH KNEES: ICD-10-CM

## 2022-03-07 RX ORDER — TRAMADOL HYDROCHLORIDE 50 MG/1
50 TABLET ORAL EVERY 6 HOURS PRN
Qty: 30 TABLET | Refills: 0 | Status: SHIPPED | OUTPATIENT
Start: 2022-03-07 | End: 2022-05-31 | Stop reason: ALTCHOICE

## 2022-03-07 NOTE — TELEPHONE ENCOUNTER
01/03/2022 01/03/2022 traMADol HCL 30 0 7 50 MG 21 43 TRICE HOOD  [facility]    Medicare 0 / 2 [place]

## 2022-05-24 ENCOUNTER — RA CDI HCC (OUTPATIENT)
Dept: OTHER | Facility: HOSPITAL | Age: 77
End: 2022-05-24

## 2022-05-24 NOTE — PROGRESS NOTES
[facility]  coding opportunities          Chart Reviewed number of suggestions sent to Provider: 2   I11 0  I42 9     Patients Insurance     Medicare Insurance: [facility]

## 2022-05-31 ENCOUNTER — OFFICE VISIT (OUTPATIENT)
Dept: FAMILY MEDICINE CLINIC | Facility: CLINIC | Age: 77
End: 2022-05-31
Payer: COMMERCIAL

## 2022-05-31 VITALS
SYSTOLIC BLOOD PRESSURE: 122 MMHG | DIASTOLIC BLOOD PRESSURE: 62 MMHG | HEART RATE: 72 BPM | RESPIRATION RATE: 16 BRPM | HEIGHT: 68 IN | TEMPERATURE: 97.2 F | BODY MASS INDEX: 24.71 KG/M2 | WEIGHT: 163 LBS

## 2022-05-31 DIAGNOSIS — K55.1 SUPERIOR MESENTERIC ARTERY STENOSIS (HCC): ICD-10-CM

## 2022-05-31 DIAGNOSIS — I42.9 CARDIOMYOPATHY, UNSPECIFIED TYPE (HCC): ICD-10-CM

## 2022-05-31 DIAGNOSIS — I47.1 PAROXYSMAL SUPRAVENTRICULAR TACHYCARDIA (HCC): ICD-10-CM

## 2022-05-31 DIAGNOSIS — I50.22 CHF (CONGESTIVE HEART FAILURE), NYHA CLASS I, CHRONIC, SYSTOLIC (HCC): ICD-10-CM

## 2022-05-31 DIAGNOSIS — N28.1 BILATERAL RENAL CYSTS: ICD-10-CM

## 2022-05-31 DIAGNOSIS — E03.8 SUBCLINICAL HYPOTHYROIDISM: ICD-10-CM

## 2022-05-31 DIAGNOSIS — J44.9 CHRONIC OBSTRUCTIVE PULMONARY DISEASE, UNSPECIFIED COPD TYPE (HCC): ICD-10-CM

## 2022-05-31 DIAGNOSIS — D75.839 THROMBOCYTOSIS: ICD-10-CM

## 2022-05-31 DIAGNOSIS — E79.0 HYPERURICEMIA: ICD-10-CM

## 2022-05-31 DIAGNOSIS — E11.29 TYPE 2 DIABETES MELLITUS WITH MICROALBUMINURIA, WITHOUT LONG-TERM CURRENT USE OF INSULIN (HCC): Primary | ICD-10-CM

## 2022-05-31 DIAGNOSIS — E78.2 MIXED HYPERLIPIDEMIA: ICD-10-CM

## 2022-05-31 DIAGNOSIS — Z95.810 AUTOMATIC IMPLANTABLE CARDIAC DEFIBRILLATOR IN SITU: ICD-10-CM

## 2022-05-31 DIAGNOSIS — R80.9 TYPE 2 DIABETES MELLITUS WITH MICROALBUMINURIA, WITHOUT LONG-TERM CURRENT USE OF INSULIN (HCC): Primary | ICD-10-CM

## 2022-05-31 DIAGNOSIS — I10 PRIMARY HYPERTENSION: ICD-10-CM

## 2022-05-31 DIAGNOSIS — I25.10 CORONARY ARTERY DISEASE INVOLVING NATIVE CORONARY ARTERY OF NATIVE HEART WITHOUT ANGINA PECTORIS: ICD-10-CM

## 2022-05-31 DIAGNOSIS — I77.1 CELIAC ARTERY STENOSIS (HCC): ICD-10-CM

## 2022-05-31 PROBLEM — I45.4 IVCD (INTRAVENTRICULAR CONDUCTION DEFECT): Status: ACTIVE | Noted: 2022-01-03

## 2022-05-31 PROCEDURE — 99214 OFFICE O/P EST MOD 30 MIN: CPT | Performed by: FAMILY MEDICINE

## 2022-05-31 PROCEDURE — 3725F SCREEN DEPRESSION PERFORMED: CPT | Performed by: FAMILY MEDICINE

## 2022-05-31 RX ORDER — FLUTICASONE FUROATE AND VILANTEROL TRIFENATATE 100; 25 UG/1; UG/1
POWDER RESPIRATORY (INHALATION)
Qty: 84 EACH | Refills: 3 | Status: SHIPPED | OUTPATIENT
Start: 2022-05-31

## 2022-05-31 NOTE — PROGRESS NOTES
Assessment/Plan:         Diagnoses and all orders for this visit:    Type 2 diabetes mellitus with microalbuminuria, without long-term current use of insulin ([facility])  -     Hemoglobin A1C  -     Microalbumin / creatinine urine ratio    Primary hypertension    CHF (congestive heart failure), NYHA class I, chronic, systolic ([facility])    Mixed hyperlipidemia  -     Comprehensive metabolic panel  -     Lipid panel    Cardiomyopathy, unspecified type ([place] [address] )    Coronary artery disease involving native coronary artery of native heart without angina pectoris    Paroxysmal supraventricular tachycardia (HCC)    Superior mesenteric artery stenosis (HCC)    Celiac artery stenosis ([facility])    Chronic obstructive pulmonary disease, unspecified COPD type ([place] [address] )  -     fluticasone-vilanterol (Breo Ellipta) 100-25 mcg/inh inhaler; USE 1 INHALATION BY MOUTH  DAILY    Thrombocytosis  -     CBC and differential    Subclinical hypothyroidism  -     TSH, 3rd generation with Free T4 reflex    Hyperuricemia    Bilateral renal cysts    Automatic implantable cardiac defibrillator in situ          Continue with current medications  Monitor BP  Follow up with Cardiology  OV 6 months with labs  He is due for an eye exam       Patient ID: José Miguel Suero is a 78 y o  male  Follow up visit  Here with his daughter  Medications reviewed  Type 2 DM  on Metformin 1,000 mg daily  02/2022 A1c  6 3   05/2021 Urine micro albumin 113 increased from 22 4  On ARB  No hypoglycemic events  No DPN  Last eye exam > 1 year ago  Hypertension blood pressures have been stable on  Metoprolol ER 25 mg daily and Entreso 49/51 BID   04/2022 creatinine 0 93  Electrolytes normal  K+ 5 0   02/2022  Hgb 12  3  Hyperlipidemia on Simvastatin 40 mg daily  11/2021 Lipid profile cholesterol  121  Triglycerides 78  HDL 42 LDL 63  TSH 1 43   01/2022 admission for VVI-ICD  Known non ischemic cardiomyopathy  02/2022 admission for pneumonia/syncope   CT chest/abdomen/pelvis  Left upper lobe peribronchial density which may represent bronchopneumonia  or less likely neoplasm   Mild emphysema   Hypodense left renal lesion measuring 2 1 cm likely representing a  hemorrhagic cyst   Colonic diverticulosis without evidence of diverticulitis  Recent Results (from the past 4704 hour(s))   Tissue Exam    Collection Time: 12/20/21  9:26 AM   Result Value Ref Range    Case Report       Surgical Pathology Report                         Case: [id number]                                   Authorizing Provider:  Matt Weaver DO         Collected:           12/20/2021 [id number]              Ordering Location:     [address] [place] [address]      Received:            12/20/2021 [address] [place] [address] Endoscopy                                                           Pathologist:           Jonathon Lin MD                                                                Specimens:   A) - Stomach, antrum r/o h  pylori                                                                  B) - Esophagogastric junction                                                                       C) - Polyp, Colorectal, ascending colon polyp, hot snare                                   Final Diagnosis       A  Gastric antrum (biopsy):     - Gastric antral mucosa with no significant pathologic abnormality      - No definitive morphologic evidence of Helicobacter on routine sections      - No intestinal metaplasia, dysplasia or neoplasia identified  B   Esophagogastric junction (biopsy):     - Gastric cardia mucosa with mild chronic inflammation        - No intestinal metaplasia, dysplasia or neoplasia identified  C   Ascending colon polyp (hot snare):     - Portions of tubular / tubulovillous adenoma; negative for high-grade dysplasia  Additional Information       All reported additional testing was performed with appropriately reactive controls    These tests were developed and their performance characteristics determined by [facility] or appropriate performing facility, though some tests may be performed on tissues which have not been validated for performance characteristics (such as staining performed on alcohol exposed cell blocks and decalcified tissues)  Results should be interpreted with caution and in the context of the patients clinical condition  These tests may not be cleared or approved by the [place]  Food and Drug Administration, though the FDA has determined that such clearance or approval is not necessary  These tests are used for clinical purposes and they should not be regarded as investigational or for research  This laboratory has been approved by [facility], designated as a high-complexity laboratory and is qualified to perform these tests  Interpretation performed at [place] [address]  Jovany Santana Synoptic Checklist          COLON/RECTUM POLYP FORM - GI - C          :    Adenoma(s)      Gross Description           A  The specimen is received in formalin, labeled with the patient's name and hospital number, and is designated "antrum rule out H pylori  The specimen consists of a single tan-red-brown, rubbery, friable soft tissue fragment measuring 0 5 cm in greatest dimension  Entirely submitted  Screened cassette  B  The specimen is received in formalin, labeled with the patient's name and hospital number, and is designated "esophagogastric junction  The specimen consists of 2 tan-red-brown, rubbery, friable soft tissue fragments ranging in greatest dimension from 0 5-0 6 cm  Entirely submitted  Screened cassette  C  The specimen is received in formalin, labeled with the patient's name and hospital number, and is designated "ascending colon polyp, hot snare  The specimen consists of 5 tan-red-brown, rubbery, friable soft tissue fragments ranging in greatest dimension from 0 2-0 4 cm    There is also a 0 9 x 0 8 x 0 6 cm tan-brown, rubbery, friable polypoid soft tissue fragment with no attached stalk  The margin of resection is inked blue  The specimen is trisected revealing tan grossly unremarkable cut surfaces  Entirely submitted  2 screened cassettes  Cassette 1 - 5 soft tissue fragments  Cassette 2 - trisected polypoid    Note: The estimated total formalin fixation time based upon information provided by the submitting clinician and the standard processing schedule is under 72 hours      Melissa       HEMOGLOBIN A1C    Collection Time: 02/05/22  3:02 AM   Result Value Ref Range    Hemoglobin A1C 6 3 (H) <5 7 %    eAG, EST AVG Glucose 134 <154 mg/dL     Lab Results   Component Value Date    WBC 8 17 11/15/2021    HGB 12 0 11/15/2021    HCT 38 2 11/15/2021    MCV 85 11/15/2021     (H) 11/15/2021     Lab Results   Component Value Date    SODIUM 136 11/15/2021    K 4 7 11/15/2021     11/15/2021    CO2 26 11/15/2021    AGAP 6 11/15/2021    BUN 15 11/15/2021    CREATININE 0 79 11/15/2021    GLUC 102 05/04/2021    GLUF 118 (H) 11/15/2021    CALCIUM 9 6 11/15/2021    AST 7 11/15/2021    ALT 14 11/15/2021    ALKPHOS 87 11/15/2021    TP 7 2 11/15/2021    TP 6 7 11/15/2021    TBILI 0 41 11/15/2021    EGFR 88 11/15/2021     Lab Results   Component Value Date    FFS2GQIOIOWA 1 440 11/15/2021     Lab Results   Component Value Date    CHOLESTEROL 120 11/15/2021    CHOLESTEROL 116 05/04/2021    CHOLESTEROL 114 02/10/2020     Lab Results   Component Value Date    HDL 37 (L) 11/15/2021    HDL 40 05/04/2021    HDL 37 (L) 02/10/2020     Lab Results   Component Value Date    TRIG 75 11/15/2021    TRIG 90 05/04/2021    TRIG 92 02/10/2020     Lab Results   Component Value Date    LDLCALC 68 11/15/2021         The following portions of the patient's history were reviewed and updated as appropriate: allergies, current medications, past family history, past medical history, past social history, past surgical history and problem list     Review of Systems   Constitutional: Positive for unexpected weight change  Negative for appetite change, chills and fever  Appetite improved  19 lb weight gain from 11/2021   HENT: Negative for congestion, ear pain, rhinorrhea, sore throat and trouble swallowing  Eyes: Negative for visual disturbance  Respiratory: Negative for cough, shortness of breath and wheezing  Ex-smoker  COPD on Breo daily  Exertional dyspnea no changes  No orthopnea or PND   Cardiovascular: Negative for chest pain, palpitations and leg swelling  See HPI  History of nonischemic cardiomyopathy followed by Cardiology  10/2021 echocardiogram ventricular systolic function severely reduced with EF 30% wall motion within normal limits  Right ventricle normal   Mildly dilated left atrium  No valvular disease  Aortic root normal   No pericardial effusion  07/2021 nuclear stress test consistent with severe nonischemic cardiomyopathy with severely dilated left ventricle  EF 27%  Severe global hypokinesis  No reversible defects to suggest ischemia  02/2020 echocardiogram mildly reduced left ventricular systolic function EF 04%  Mild global hypokinesis  Mildly dilated left atrium  Mildly dilated right atrium  Mildly enlarged ascending aorta  No significant valve abnormalities  Gastrointestinal: Negative for abdominal pain, blood in stool, constipation, diarrhea, nausea and vomiting         OV 11/2021 for weight loss  12/2021 EGD normal Colonoscopy normal except for 1 polyp  12/2021 abdominal u/s normal except for bilateral renal cysts  12/2021 celiac/mesenteric duplex  aorta is patent and ectatic measuring 2 8cm at its greatest diameter  >70% stenosis in the celiac artery  Velocities decrease with inspiration which may be suggestive of median arcuate ligament syndrome  Splenic and hepatic arteries are patent   >70% stenosis in the superior mesenteric artery in a fasting state   The inferior mesenteric artery is normal and patent  Endocrine: Negative for polydipsia and polyuria  Subclinical hypothyroidism not on medication    Lab Results       Component                Value               Date                       [id number]             1 440               11/15/2021                          Genitourinary: Negative for difficulty urinating  Musculoskeletal: Positive for back pain  Negative for arthralgias and myalgias  Skin: Negative for rash  Status post resection BCC left cheek and left neck  05/2019 s/p resection SCC in situ left lower back  Allergic/Immunologic: Negative for environmental allergies  Neurological: Negative for dizziness and headaches  02/2022 CT scan head No acute hemorrhage, mass or ischemia identified  Areas of supratentorial white matter low attenuation likely chronic small vessel ischemic disease  Ventricular system, basal cisterns and extra-axial spaces: Prominent compatible   generalized parenchymal volume loss  Hematological: Negative for adenopathy  Does not bruise/bleed easily          Lab Results       Component                Value               Date                       WBC                      8 17                11/15/2021                 HGB                      12 0                11/15/2021                 HCT                      38 2                11/15/2021                 MCV                      85                  11/15/2021                 PLT                      418 (H)             11/15/2021                   Platelets       Date                     Value               Ref Range           Status                05/04/2021               406 (H)             149 - 390 Thou*     Final                 01/21/2021               307                 149 - 390 Thou*     Final                 02/10/2020               299                 149 - 390 Thou*     Final                 Psychiatric/Behavioral: Negative for dysphoric mood and sleep disturbance  Objective:    /62   Pulse 72   Temp (!) 97 2 °F (36 2 °C)   Resp 16   Ht 5' 8" (1 727 m)   Wt 73 9 kg (163 lb)   BMI 24 78 kg/m²     BP Readings from Last 3 Encounters:   05/31/22 122/62   12/20/21 119/70   11/15/21 121/90       Wt Readings from Last 3 Encounters:   05/31/22 73 9 kg (163 lb)   12/08/21 63 5 kg (140 lb)   11/15/21 65 3 kg (144 lb)          Physical Exam  Vitals and nursing note reviewed  Constitutional:       General: He is not in acute distress  Appearance: He is well-developed  HENT:      Right Ear: Tympanic membrane normal       Left Ear: Tympanic membrane normal    Eyes:      General: No scleral icterus  Extraocular Movements: Extraocular movements intact  Conjunctiva/sclera: Conjunctivae normal       Pupils: Pupils are equal, round, and reactive to light  Neck:      Thyroid: No thyroid mass or thyromegaly  Vascular: No carotid bruit or JVD  Trachea: No tracheal deviation  Cardiovascular:      Rate and Rhythm: Normal rate and regular rhythm  Pulses:           Carotid pulses are 2+ on the right side and 2+ on the left side  Heart sounds: Normal heart sounds  No murmur heard  No gallop  Pulmonary:      Effort: Pulmonary effort is normal  No respiratory distress  Breath sounds: Normal breath sounds  No wheezing or rales  Chest:   Breasts:      Right: No supraclavicular adenopathy  Left: No supraclavicular adenopathy  Abdominal:      General: Bowel sounds are normal  There is no distension or abdominal bruit  Palpations: Abdomen is soft  There is no hepatomegaly, splenomegaly or mass  Tenderness: There is no abdominal tenderness  There is no guarding or rebound  Musculoskeletal:      Right lower leg: No edema  Left lower leg: No edema  Lymphadenopathy:      Cervical: No cervical adenopathy  Upper Body:      Right upper body: No supraclavicular adenopathy        Left upper body: No supraclavicular adenopathy  Skin:     Findings: No rash  Nails: There is no clubbing  Neurological:      General: No focal deficit present  Mental Status: He is alert and oriented to person, place, and time     Psychiatric:         Mood and Affect: Mood normal

## 2022-06-01 PROBLEM — R63.4 WEIGHT LOSS: Status: RESOLVED | Noted: 2021-12-08 | Resolved: 2022-06-01

## 2022-07-12 ENCOUNTER — OFFICE VISIT (OUTPATIENT)
Dept: SURGERY | Facility: CLINIC | Age: 77
End: 2022-07-12
Payer: COMMERCIAL

## 2022-07-12 VITALS — WEIGHT: 160 LBS | HEIGHT: 68 IN | BODY MASS INDEX: 24.25 KG/M2

## 2022-07-12 DIAGNOSIS — B36.9 FUNGAL RASH OF TRUNK: Primary | ICD-10-CM

## 2022-07-12 PROCEDURE — 99212 OFFICE O/P EST SF 10 MIN: CPT | Performed by: SURGERY

## 2022-07-12 PROCEDURE — 1160F RVW MEDS BY RX/DR IN RCRD: CPT | Performed by: SURGERY

## 2022-07-12 RX ORDER — CLOTRIMAZOLE 1 %
CREAM (GRAM) TOPICAL 2 TIMES DAILY
Qty: 113 G | Refills: 0 | Status: SHIPPED | OUTPATIENT
Start: 2022-07-12

## 2022-07-12 NOTE — PROGRESS NOTES
Assessment/Plan:  Patient has known several keratosis over the chest and back  He presents with new skin eruptions intermittently located over the back and chest   There is a slight pink hue  He is nonraised  It is are regular  It is not scaling  It is not pruritic  Etiology is speculative, but I would anticipate a fungal infection  Prescription for Lotrimin was provided  A consultation to meet with dermatology in the future was placed in case this rash does not resolve  All questions answered  There are no diagnoses linked to this encounter  Subjective:      Patient ID: Laurie Huntley is a 78 y o  male  Patient presents for evaluation of skin lesions on his back  He has had the skin lesions for 3 months  The skin lesions have increased in size  The following portions of the patient's history were reviewed and updated as appropriate:     He  has a past medical history of Angina pectoris ([facility] [address] ), Basal cell carcinoma, Colitis (11/22/2017), COPD (chronic obstructive pulmonary disease) ([facility] [address] ), Diabetes mellitus ([facility] ), Hyperlipidemia, Hypertension, and Malignant melanoma ([facility] ) (2/24/2020)  He  has a past surgical history that includes Neck surgery; Knee arthroscopy (Right); pr exc skin malig >4 cm face,facial (N/A, 4/29/2016); Hand surgery (Left); Colonoscopy; Cardiac surgery (2014); and pr exc skin malig >4 cm trunk,arm,leg (Left, 5/30/2019)  His family history includes Colon cancer in his sister; Stomach cancer in his father  He  reports that he quit smoking about 4 years ago  He has a 40 00 pack-year smoking history  He has never used smokeless tobacco  He reports current alcohol use of about 1 0 standard drink of alcohol per week  He reports that he does not use drugs  Current Outpatient Medications   Medication Sig Dispense Refill    aspirin 81 MG tablet Take 81 mg by mouth daily        fluticasone-vilanterol (Breo Ellipta) 100-25 mcg/inh inhaler USE 1 INHALATION BY MOUTH  DAILY 84 each 3    metFORMIN (GLUCOPHAGE) 1000 MG tablet Take 1 tablet (1,000 mg total) by mouth 2 (two) times a day with meals (Patient taking differently: Take 1,000 mg by mouth daily with breakfast) 180 tablet 3    metoprolol succinate (TOPROL-XL) 25 mg 24 hr tablet Take 1 tablet (25 mg total) by mouth daily 90 tablet 3    nitroglycerin (NITROSTAT) 0 4 mg SL tablet       sacubitril-valsartan (Entresto) 49-51 MG TABS Take 1 tablet by mouth 2 (two) times a day      simvastatin (ZOCOR) 40 mg tablet Take 1 tablet (40 mg total) by mouth daily at bedtime for 90 days 90 tablet 3     No current facility-administered medications for this visit  He has No Known Allergies       Review of Systems   Constitutional: Positive for unexpected weight change  Negative for activity change  HENT: Negative  Eyes: Negative  Respiratory: Negative  Cardiovascular: Negative  Gastrointestinal: Positive for abdominal pain  Negative for anal bleeding and blood in stool  Endocrine: Negative  Genitourinary: Negative  Musculoskeletal: Negative  Skin: Negative  Allergic/Immunologic: Negative  Neurological: Negative  Psychiatric/Behavioral: Negative for agitation, behavioral problems and confusion  The patient is not nervous/anxious  All other systems reviewed and are negative  Objective:      Ht 5' 8" (1 727 m)   Wt 72 6 kg (160 lb)   BMI 24 33 kg/m²          Physical Exam  Constitutional:       Appearance: He is well-developed  He is not diaphoretic  HENT:      Head: Normocephalic and atraumatic  Eyes:      General: No scleral icterus  Right eye: No discharge  Left eye: No discharge  Extraocular Movements: Extraocular movements intact  Conjunctiva/sclera: Conjunctivae normal    Neck:      Thyroid: No thyromegaly  Trachea: No tracheal deviation  Cardiovascular:      Rate and Rhythm: Normal rate  Heart sounds: No murmur heard  No friction rub     Pulmonary: Effort: Pulmonary effort is normal  No respiratory distress  Breath sounds: No stridor  No wheezing  Chest:      Chest wall: No tenderness  Abdominal:      General: There is no distension  Palpations: Abdomen is soft  There is no mass  Tenderness: There is no abdominal tenderness  There is no guarding or rebound  Musculoskeletal:         General: No tenderness  Cervical back: Normal range of motion and neck supple  Lymphadenopathy:      Cervical: No cervical adenopathy  Skin:     General: Skin is warm and dry  Findings: No erythema or rash  Comments: Slightly pink nonraised intermittent skin lesions over the back and chest   It measures approximately 1 cm  Not pruritic  No scaling  Neurological:      Mental Status: He is alert and oriented to person, place, and time  Cranial Nerves: No cranial nerve deficit        Coordination: Coordination normal    Psychiatric:         Behavior: Behavior normal          Judgment: Judgment normal

## 2022-08-11 PROCEDURE — 3066F NEPHROPATHY DOC TX: CPT | Performed by: SURGERY

## 2022-09-13 ENCOUNTER — TELEPHONE (OUTPATIENT)
Dept: FAMILY MEDICINE CLINIC | Facility: CLINIC | Age: 77
End: 2022-09-13

## 2022-09-13 ENCOUNTER — APPOINTMENT (OUTPATIENT)
Dept: RADIOLOGY | Age: 77
End: 2022-09-13
Payer: COMMERCIAL

## 2022-09-13 ENCOUNTER — OFFICE VISIT (OUTPATIENT)
Dept: URGENT CARE | Age: 77
End: 2022-09-13
Payer: COMMERCIAL

## 2022-09-13 VITALS
SYSTOLIC BLOOD PRESSURE: 105 MMHG | HEART RATE: 100 BPM | OXYGEN SATURATION: 98 % | DIASTOLIC BLOOD PRESSURE: 58 MMHG | RESPIRATION RATE: 16 BRPM | TEMPERATURE: 97 F

## 2022-09-13 DIAGNOSIS — R93.89 ABNORMAL CHEST X-RAY: Primary | ICD-10-CM

## 2022-09-13 DIAGNOSIS — R06.02 SHORTNESS OF BREATH: Primary | ICD-10-CM

## 2022-09-13 DIAGNOSIS — R06.02 SHORTNESS OF BREATH: ICD-10-CM

## 2022-09-13 PROCEDURE — 99213 OFFICE O/P EST LOW 20 MIN: CPT | Performed by: NURSE PRACTITIONER

## 2022-09-13 PROCEDURE — S9083 URGENT CARE CENTER GLOBAL: HCPCS | Performed by: NURSE PRACTITIONER

## 2022-09-13 PROCEDURE — 71046 X-RAY EXAM CHEST 2 VIEWS: CPT

## 2022-09-13 RX ORDER — BENZONATATE 200 MG/1
200 CAPSULE ORAL 3 TIMES DAILY PRN
Qty: 20 CAPSULE | Refills: 0 | Status: SHIPPED | OUTPATIENT
Start: 2022-09-13

## 2022-09-13 RX ORDER — AZITHROMYCIN 250 MG/1
TABLET, FILM COATED ORAL
Qty: 6 TABLET | Refills: 0 | Status: SHIPPED | OUTPATIENT
Start: 2022-09-13 | End: 2022-09-17

## 2022-09-13 RX ORDER — PREDNISONE 20 MG/1
20 TABLET ORAL 2 TIMES DAILY WITH MEALS
Qty: 10 TABLET | Refills: 0 | Status: SHIPPED | OUTPATIENT
Start: 2022-09-13 | End: 2022-09-18

## 2022-09-13 NOTE — TELEPHONE ENCOUNTER
Patient went to Urgent care today and it was recommended that the pcp orders a  Catscan and to follow up appointment        Please advise if catscan needs to be ordered and then follow up appointment or to see him first to discuss    I

## 2022-09-13 NOTE — PROGRESS NOTES
[email or web address]        NAME: Jennifer Valdez is a 78 y o  male  : 1945    MRN: [id number]  DATE: 2022  TIME: 2:02 PM    Assessment and Plan   Shortness of breath [R06 02]  1  Shortness of breath  XR chest pa & lateral    azithromycin (ZITHROMAX) 250 mg tablet    benzonatate (TESSALON) 200 MG capsule    predniSONE 20 mg tablet         Patient Instructions     Take meds as directed  CXR results pending  Follow up with PCP in 3-5 days  Proceed to  ER if symptoms worsen  Chief Complaint     Chief Complaint   Patient presents with    Cough     Intermittent productive cough, congestion, fever, recent pneumonia in January, since Saturday         History of Present Illness       HPI   Reports upper resp symptoms x 4 days  Getting worse  Now with congestion, cough, and SON  Initially felt feverish and was sweating intermittently, but did not check his temp  Home covid test was negative, earlier today    Review of Systems   Review of Systems   Constitutional: Positive for chills, fatigue and fever  HENT: Positive for congestion and rhinorrhea  Negative for sore throat  Respiratory: Positive for cough and shortness of breath  Negative for chest tightness and wheezing  Cardiovascular: Negative for chest pain  Gastrointestinal: Negative for diarrhea and vomiting  Neurological: Negative for headaches  Current Medications       Current Outpatient Medications:     aspirin 81 MG tablet, Take 81 mg by mouth daily  , Disp: , Rfl:     azithromycin (ZITHROMAX) 250 mg tablet, Take 2 tablets today then 1 tablet daily x 4 days, Disp: 6 tablet, Rfl: 0    benzonatate (TESSALON) 200 MG capsule, Take 1 capsule (200 mg total) by mouth 3 (three) times a day as needed for cough, Disp: 20 capsule, Rfl: 0    clotrimazole (LOTRIMIN) 1 % cream, Apply topically 2 (two) times a day, Disp: 113 g, Rfl: 0    fluticasone-vilanterol (Breo Ellipta) 100-25 mcg/inh inhaler, USE 1 INHALATION BY MOUTH DAILY, Disp: 84 each, Rfl: 3    metoprolol succinate (TOPROL-XL) 25 mg 24 hr tablet, Take 1 tablet (25 mg total) by mouth daily, Disp: 90 tablet, Rfl: 3    nitroglycerin (NITROSTAT) 0 4 mg SL tablet, , Disp: , Rfl:     predniSONE 20 mg tablet, Take 1 tablet (20 mg total) by mouth 2 (two) times a day with meals for 5 days, Disp: 10 tablet, Rfl: 0    sacubitril-valsartan (Entresto) 49-51 MG TABS, Take 1 tablet by mouth 2 (two) times a day, Disp: , Rfl:     metFORMIN (GLUCOPHAGE) 1000 MG tablet, Take 1 tablet (1,000 mg total) by mouth 2 (two) times a day with meals (Patient taking differently: Take 1,000 mg by mouth daily with breakfast), Disp: 180 tablet, Rfl: 3    simvastatin (ZOCOR) 40 mg tablet, Take 1 tablet (40 mg total) by mouth daily at bedtime for 90 days, Disp: 90 tablet, Rfl: 3    Current Allergies     Allergies as of 09/13/2022    (No Known Allergies)            The following portions of the patient's history were reviewed and updated as appropriate: allergies, current medications, past family history, past medical history, past social history, past surgical history and problem list      Past Medical History:   Diagnosis Date    Angina pectoris ([facility] [address] )     "in the past"    Basal cell carcinoma     Resolved 8/25/2017     Colitis 11/22/2017    COPD (chronic obstructive pulmonary disease) ([facility] [address] )     Diabetes mellitus ([facility] [address] )     NIDDM    Hyperlipidemia     Hypertension     Malignant melanoma ([facility] [address] ) 2/24/2020       Past Surgical History:   Procedure Laterality Date    CARDIAC SURGERY  2014    cardiac cath    COLONOSCOPY      HAND SURGERY Left     KNEE ARTHROSCOPY Right     NECK SURGERY      FL EXC SKIN MALIG >4 CM FACE,FACIAL N/A 4/29/2016    Procedure: LEFT CHEEK BCC EXCISION; FROZEN SECTION; BILATERAL LOWER EYELIDS SUSPICIOUS LESION EXCISION; LEFT CHEEK EXTRACTION OF COMEDONES X 2;  Surgeon: Quyen Shoemaker MD;  [facility];  Service: Plastics    FL EXC SKIN MALIG >4 CM TRUNK,ARM,LEG Left 5/30/2019    Procedure: EXCISION WIDE LESION TRUNK/ABDOMEN/BACK;  Surgeon: Shara Schwartz MD;  [facility];  Service: General       Family History   Problem Relation Age of Onset    Stomach cancer Father     Colon cancer Sister          Medications have been verified  Objective   /58   Pulse 100   Temp (!) 97 °F (36 1 °C)   Resp 16   SpO2 98%   No LMP for male patient  Physical Exam     Physical Exam  Constitutional:       Appearance: He is ill-appearing  He is not diaphoretic  HENT:      Right Ear: Tympanic membrane normal       Left Ear: Tympanic membrane normal       Nose: Rhinorrhea present  Mouth/Throat:      Mouth: Mucous membranes are moist       Comments: Post nasal drip  Cardiovascular:      Rate and Rhythm: Regular rhythm  Pulmonary:      Breath sounds: Wheezing present  Comments: Slight increased effort with talking   Congestion in the chest noted with coughing

## 2022-09-20 ENCOUNTER — HOSPITAL ENCOUNTER (OUTPATIENT)
Dept: RADIOLOGY | Age: 77
Discharge: HOME | End: 2022-09-20
Payer: COMMERCIAL

## 2022-09-20 ENCOUNTER — TELEPHONE (OUTPATIENT)
Dept: FAMILY MEDICINE CLINIC | Facility: CLINIC | Age: 77
End: 2022-09-20

## 2022-09-20 DIAGNOSIS — R93.89 ABNORMAL CHEST X-RAY: ICD-10-CM

## 2022-09-20 PROCEDURE — G1004 CDSM NDSC: HCPCS

## 2022-09-20 PROCEDURE — 71250 CT THORAX DX C-: CPT

## 2022-09-21 ENCOUNTER — TELEPHONE (OUTPATIENT)
Dept: FAMILY MEDICINE CLINIC | Facility: CLINIC | Age: 77
End: 2022-09-21

## 2022-09-21 DIAGNOSIS — R91.1 NODULE OF UPPER LOBE OF LEFT LUNG: Primary | ICD-10-CM

## 2022-09-21 NOTE — TELEPHONE ENCOUNTER
I called and spoke with patient's dtg Cora patient was initially seen at Urgent Care on 09/13 /2022 for a cough  Subsequently sent for a CT scan due to abnormal CXR  Overall feeling better with reduction in his cough  Recommendation Pulmonary Medicine referral        Procedure: XR chest pa & lateral    Result Date: 9/13/2022  Narrative: CHEST INDICATION:   R06 02: Shortness of breath  COMPARISON:  None EXAM PERFORMED/VIEWS:  XR CHEST PA & LATERAL FINDINGS: Cardiomediastinal silhouette appears unremarkable  Left chest single lead cardiac pacer device seen  Lower cervical spinal fusion hardware also noted  Left perihilar opacity is seen which may be secondary to neoplasm, infection, or focal parenchymal scarring  Consider CT chest for further evaluation  Lungs otherwise clear without airspace consolidation  No pneumothorax or pleural effusion  Osseous structures appear within normal limits for patient age  No free air in the upper abdomen  Impression: Left perihilar opacity is seen which may be secondary to neoplasm, infection, or focal parenchymal scarring  Consider CT chest for further evaluation  Lungs otherwise clear without airspace consolidation  The study was marked in EPIC for significant notification  Workstation performed: [id number]     Procedure: CT chest wo contrast    Result Date: 9/20/2022  Narrative: CT CHEST WITHOUT IV CONTRAST INDICATION:   R93 89: Abnormal findings on diagnostic imaging of other specified body structures  COMPARISON:  Two-view chest 9/13/2022 and outside CT chest report 2/3/2022 TECHNIQUE: CT examination of the chest was performed without intravenous contrast  Axial, sagittal, and coronal 2D reformatted images were created from the source data and submitted for interpretation  Radiation dose length product (DLP) for this visit:  204 mGy-cm     This examination, like all CT scans performed in the [facility], was performed utilizing techniques to minimize radiation dose exposure, including the use of iterative reconstruction and automated exposure control  FINDINGS: LUNGS:  2 x 1 4 cm spiculated cavitary nodule in the left upper lobe with adjacent focal bronchiolectasis and peribronchial soft tissue thickening  Few additional punctate pulmonary nodules; representative nodules will be measured/marked on series 3: Image 46, left lower lobe, 2 mm  Image 79, right lower lobe, 4 mm  Punctate calcific granuloma left lower lobe  COPD  No infiltrate  Central airways are clear  PLEURA:  Unremarkable  HEART/GREAT VESSELS: Heart is not enlarged  Distal cardiac pacer/defibrillator lead in place  Trace fluid pooling in the anterior pericardial sac  Minimal aortic calcification  No thoracic aortic aneurysm  MEDIASTINUM AND DELANO:  Unremarkable  CHEST WALL AND LOWER NECK:  Minimal bilateral gynecomastia  Left anterior chest wall cardiac pacer  VISUALIZED STRUCTURES IN THE UPPER ABDOMEN:  Punctate hepatic calcified granuloma  OSSEOUS STRUCTURES:  No acute fracture or osseous destructive lesion identified  Mild degenerative changes of the spine  Partially visualized cervical spine fusion hardware  Impression: 2 cm spiculated cavitary nodule in the left upper lobe with adjacent bronchiolectasis and parenchymal soft tissue thickening  Peribronchial opacity was described in the left upper lobe without specific measurement on the prior outside chest CT report; images unavailable for review  Follow-up with CT PET or tissue sampling is advised  If outside CT study is made available for review and the irregular opacity is found to be stable or smaller, short-term CT surveillance in 3 months may be considered  Otherwise recommendation is as above  COPD  This study demonstrates a significant  finding and was documented as such in [facility] for liaison and referring practitioner notification   Workstation performed: [id number]

## 2022-10-01 DIAGNOSIS — E11.29 TYPE 2 DIABETES MELLITUS WITH MICROALBUMINURIA, WITHOUT LONG-TERM CURRENT USE OF INSULIN (HCC): ICD-10-CM

## 2022-10-01 DIAGNOSIS — R80.9 TYPE 2 DIABETES MELLITUS WITH MICROALBUMINURIA, WITHOUT LONG-TERM CURRENT USE OF INSULIN (HCC): ICD-10-CM

## 2022-10-14 RX ORDER — SPIRONOLACTONE 25 MG/1
TABLET ORAL
COMMUNITY
Start: 2022-09-10

## 2022-10-19 ENCOUNTER — CONSULT (OUTPATIENT)
Dept: PULMONOLOGY | Facility: CLINIC | Age: 77
End: 2022-10-19
Payer: COMMERCIAL

## 2022-10-19 VITALS
OXYGEN SATURATION: 97 % | SYSTOLIC BLOOD PRESSURE: 118 MMHG | RESPIRATION RATE: 18 BRPM | HEART RATE: 84 BPM | HEIGHT: 68 IN | TEMPERATURE: 97 F | DIASTOLIC BLOOD PRESSURE: 64 MMHG | WEIGHT: 169 LBS | BODY MASS INDEX: 25.61 KG/M2

## 2022-10-19 DIAGNOSIS — J44.9 CHRONIC OBSTRUCTIVE PULMONARY DISEASE, UNSPECIFIED COPD TYPE (HCC): ICD-10-CM

## 2022-10-19 DIAGNOSIS — I50.22 CHF (CONGESTIVE HEART FAILURE), NYHA CLASS I, CHRONIC, SYSTOLIC (HCC): ICD-10-CM

## 2022-10-19 DIAGNOSIS — F17.211 CIGARETTE NICOTINE DEPENDENCE IN REMISSION: ICD-10-CM

## 2022-10-19 DIAGNOSIS — R49.0 HOARSENESS: ICD-10-CM

## 2022-10-19 DIAGNOSIS — R93.89 ABNORMAL CT OF THE CHEST: Primary | ICD-10-CM

## 2022-10-19 DIAGNOSIS — R91.1 NODULE OF UPPER LOBE OF LEFT LUNG: ICD-10-CM

## 2022-10-19 PROCEDURE — 94010 BREATHING CAPACITY TEST: CPT | Performed by: INTERNAL MEDICINE

## 2022-10-19 PROCEDURE — 99205 OFFICE O/P NEW HI 60 MIN: CPT | Performed by: INTERNAL MEDICINE

## 2022-10-19 NOTE — PROGRESS NOTES
Pulmonary Consultation   Dania Duffy 78 y o  male MRN: [id number]  10/19/2022      Reason for Consultation:  Lung Nodule     Requested by: Dr Fermín Gray:  1  Abnormal CT Chest - Spiculated, cavitary DEEPALI nodule  · Concerning images for possible malignancy especially given abnormality reported in DEEPALI on 2/2022 CT chest  · I reviewed images with the patient and his daughter, discussed concerns for possible malignancy as well as possible infectious etiologies though given history these are less likely  · Plan to obtain LVH images  · Plan to obtain CT/PET with RISHABH bronch protocol - discussed tissue sampling methods including Navigational bronchoscopy  · Obtain complete PFTs    2  COPD without acute exacerbation  · No changes in medication regimen at this time  · Obtain complete PFTs    3  Nicotine dependence in remission    4  NICM EF 30% - has AICD, will need adjustments prior to Rishabh Bronch    5  Hoarseness - referral to ENT    Plan:    Diagnoses and all orders for this visit:    Abnormal CT of the chest  -     POCT spirometry  -     NM PET CT skull base to mid thigh; Future  -     Complete PFT with post bronchodilator; Future  -     CT chest wo contrast; Future    Nodule of upper lobe of left lung  -     Ambulatory Referral to Pulmonology    Chronic obstructive pulmonary disease, unspecified COPD type ([facility] [address] )    Cigarette nicotine dependence in remission    Hoarseness  -     Ambulatory Referral to Otolaryngology; Future    CHF (congestive heart failure), NYHA class I, chronic, systolic (HCC)    Other orders  -     spironolactone (ALDACTONE) 25 mg tablet;  (Patient not taking: Reported on 10/19/2022)        History of Present Illness   HPI:  Dania Duffy is a 78 y o  male who DM, HTN, HLP, melanoma - surgically resected, NICM - EF 30-35%, COPD intermittently on Breo  He presents today for lung nodule evaluation   He reports he has not felt well for the past 12+ months with prior concerns for 40lb weight loss and fatigue  These all have resolved with AICD placement and further cardiac care  He reports he had pneumonia in September and had CXR performed revealing DEEPALI infiltrate and CT then obtained with DEEPALI spiculated cavitary nodule  He denied sputum production, no hemoptysis, no pleurisy  He reports mild exertional dyspnea that has overall remained stable (stairs, carrying objects)  He continues to perform his ADLs and EADLs independently  He denied adenopathy  He reports only using Breo intermittently  Reports no CORRY use  Denied dysphagia or aspiration events    Review of Systems   Constitutional: Negative for activity change, chills, fatigue, fever and unexpected weight change  HENT: Positive for voice change  Negative for mouth sores, sore throat and trouble swallowing  Eyes: Negative for visual disturbance  Respiratory: Positive for shortness of breath  Negative for cough, chest tightness and wheezing  Cardiovascular: Negative for chest pain and leg swelling  Gastrointestinal: Negative for abdominal pain, nausea and vomiting  Musculoskeletal: Positive for arthralgias and gait problem  Skin: Negative for rash  Allergic/Immunologic: Negative for immunocompromised state  Neurological: Negative for light-headedness and headaches  Hematological: Negative for adenopathy  Psychiatric/Behavioral: Negative for sleep disturbance  The patient is not nervous/anxious          Historical Information   Past Medical History:   Diagnosis Date   • Angina pectoris ([facility] [address] )     "in the past"   • Basal cell carcinoma     Resolved 8/25/2017    • Colitis 11/22/2017   • COPD (chronic obstructive pulmonary disease) (HCC)    • Diabetes mellitus ([facility] [address] )     NIDDM   • Hyperlipidemia    • Hypertension    • Malignant melanoma ([facility] [address] ) 2/24/2020     Past Surgical History:   Procedure Laterality Date   • CARDIAC SURGERY  2014    cardiac cath   • COLONOSCOPY     • HAND SURGERY Left    • KNEE ARTHROSCOPY Right    • NECK SURGERY • [place] EXC SKIN MALIG >4 CM FACE,FACIAL N/A 4/29/2016    Procedure: LEFT CHEEK BCC EXCISION; FROZEN SECTION; BILATERAL LOWER EYELIDS SUSPICIOUS LESION EXCISION; LEFT CHEEK EXTRACTION OF COMEDONES X 2;  Surgeon: Rosalie Contreras MD;  [facility];  Service: Plastics   • IL EXC SKIN MALIG >4 CM TRUNK,ARM,LEG Left 5/30/2019    Procedure: EXCISION WIDE LESION TRUNK/ABDOMEN/BACK;  Surgeon: Felicita Gregorio MD;  [facility];  Service: General     Family History   Problem Relation Age of Onset   • Stomach cancer Father    • Colon cancer Sister        Occupational History:  for [employer], [employer] exposures - retired    Social History: former smoker, 2ppd x 45+ years - quit 2020, pet dog, no exotic animal exposures    Meds/Allergies     Current Outpatient Medications:   •  aspirin 81 MG tablet, Take 81 mg by mouth daily  , Disp: , Rfl:   •  fluticasone-vilanterol (Breo Ellipta) 100-25 mcg/inh inhaler, USE 1 INHALATION BY MOUTH  DAILY, Disp: 84 each, Rfl: 3  •  metFORMIN (GLUCOPHAGE) 1000 MG tablet, Take 1 tablet (1,000 mg total) by mouth 2 (two) times a day with meals, Disp: 180 tablet, Rfl: 0  •  metoprolol succinate (TOPROL-XL) 25 mg 24 hr tablet, Take 1 tablet (25 mg total) by mouth daily, Disp: 90 tablet, Rfl: 3  •  sacubitril-valsartan (Entresto) 49-51 MG TABS, Take 1 tablet by mouth 2 (two) times a day, Disp: , Rfl:   •  simvastatin (ZOCOR) 40 mg tablet, Take 1 tablet (40 mg total) by mouth daily at bedtime for 90 days, Disp: 90 tablet, Rfl: 3  •  benzonatate (TESSALON) 200 MG capsule, Take 1 capsule (200 mg total) by mouth 3 (three) times a day as needed for cough (Patient not taking: Reported on 10/19/2022), Disp: 20 capsule, Rfl: 0  •  clotrimazole (LOTRIMIN) 1 % cream, Apply topically 2 (two) times a day (Patient not taking: Reported on 10/19/2022), Disp: 113 g, Rfl: 0  •  nitroglycerin (NITROSTAT) 0 4 mg SL tablet, , Disp: , Rfl:   •  spironolactone (ALDACTONE) 25 mg tablet, , Disp: , Rfl:   No Known Allergies    Vitals: Blood pressure 118/64, pulse 84, temperature (!) 97 °F (36 1 °C), temperature source Tympanic, resp  rate 18, height 5' 8" (1 727 m), weight 76 7 kg (169 lb), SpO2 97 %  , Body mass index is 25 7 kg/m²  Oxygen Therapy  SpO2: 97 %  Oxygen Therapy: None (Room air)    Physical Exam  Physical Exam  Vitals reviewed  Constitutional:       General: He is not in acute distress  Appearance: Normal appearance  He is well-developed and normal weight  He is not ill-appearing, toxic-appearing or diaphoretic  HENT:      Head: Normocephalic and atraumatic  Right Ear: External ear normal       Left Ear: External ear normal       Nose: Nose normal       Mouth/Throat:      Mouth: Mucous membranes are moist       Pharynx: Oropharynx is clear  No oropharyngeal exudate  Eyes:      General: No scleral icterus  Right eye: No discharge  Left eye: No discharge  Conjunctiva/sclera: Conjunctivae normal       Pupils: Pupils are equal, round, and reactive to light  Neck:      Vascular: No JVD  Trachea: No tracheal deviation  Cardiovascular:      Rate and Rhythm: Normal rate and regular rhythm  Heart sounds: Normal heart sounds  No murmur heard  Pulmonary:      Effort: Pulmonary effort is normal  No respiratory distress  Breath sounds: Normal breath sounds  No stridor  No wheezing, rhonchi or rales  Abdominal:      General: Bowel sounds are normal  There is no distension  Palpations: Abdomen is soft  Tenderness: There is no abdominal tenderness  There is no guarding  Musculoskeletal:         General: No swelling or deformity  Cervical back: Neck supple  Right lower leg: No edema  Left lower leg: No edema  Lymphadenopathy:      Cervical: No cervical adenopathy  Skin:     General: Skin is warm and dry  Findings: No rash  Neurological:      General: No focal deficit present        Mental Status: He is alert and oriented to person, place, and time  Mental status is at baseline  Psychiatric:         Mood and Affect: Mood normal          Behavior: Behavior normal          Thought Content: Thought content normal          Labs: I have personally reviewed pertinent lab results  Lab Results   Component Value Date    WBC 8 17 11/15/2021    HGB 12 0 11/15/2021    HCT 38 2 11/15/2021    MCV 85 11/15/2021     (H) 11/15/2021     Lab Results   Component Value Date    CALCIUM 9 6 11/15/2021    K 4 7 11/15/2021    CO2 26 11/15/2021     11/15/2021    BUN 15 11/15/2021    CREATININE 0 79 11/15/2021     No results found for: IGE  Lab Results   Component Value Date    ALT 14 11/15/2021    AST 7 11/15/2021    ALKPHOS 87 11/15/2021       Imaging and other studies: I have personally reviewed pertinent reports  and I have personally reviewed pertinent films in PACS  CT Chest 9/20/2022 - 2 0/1 4 spiculated nodule in DEEPALI with some cavitary features as well as bronchiolectasis, smaller 2mm LLL nodule and 4mm RLL nodule, background emphysematous changes, no sig mediastinal or hilar adenopathy appreciated    CT C/A/P 2/3/2022 - LVH Report - left uper lobe peribronchial density, mild emphysema    Pulmonary function testing:   10/19/2022 - Spirometry - difficulty with maneuvers - Ratio 47%, FVC 3 47L (89%), FEV1 1 62L (58%) - moderate obstructive airflow defect    EKG, Pathology, and Other Studies: I have personally reviewed pertinent reports  and I have personally reviewed pertinent films in PACS  TTE 10/2021 - EF 30%, normal RV size/function    Bhargav Mercer DO, [facility]

## 2022-10-19 NOTE — PATIENT INSTRUCTIONS
Get CT/PET completed  Will call to schedule Navigational bronchoscopy - office to contact you  Get complete pulmonary function testing completed  Referred to ENT physician - Dr Na Patel

## 2022-10-21 ENCOUNTER — TELEPHONE (OUTPATIENT)
Dept: PULMONOLOGY | Facility: CLINIC | Age: 77
End: 2022-10-21

## 2022-10-21 NOTE — TELEPHONE ENCOUNTER
Called and spoke to patient's daughter I advised her the patient's chart is being reviewed as to which procedure will be most appropriate for her father and one there is a decision then we will reach out to schedule

## 2022-10-21 NOTE — TELEPHONE ENCOUNTER
Called and spoke to the patient's daughter advised her of the plan to cancel the CT and keep the PET scan and go from there

## 2022-10-21 NOTE — TELEPHONE ENCOUNTER
Interventional Pulmonary/ Imaging review    Melina Mcneal y  stefanie male  MRN [id number]    At the request of Dr Per Torres, imaging was reviewed to determine if appropriate to pursue tissue sampling via navigation bronchoscopy  CT of the chest performed on 9/20/22 shows 2 cm spiculated cavitary nodule in the left upper lobe with adjacent bronchiolectasis and parenchymal soft tissue thickening  Additional chart review shows that patient has ICD/ pacemaker with an ischemic cardiomyopathy, EF 30%  Although the lesion is likely accessible via navigation bronchoscopy, given his cardiac device and the possibility that our navigational field could negatively impacted it's function, I would consider alternate biopsy strategy  I have recommended proceeding with PET scan to help determine next steps  If only the lesion is positive, would favor IR biopsy    If there is central adenopathy that is PET positive, would proceed with endobronchial ultrasound to obtain the diagnosis    This was discussed with AYESHA Vargas , [facility]

## 2022-10-21 NOTE — TELEPHONE ENCOUNTER
Pts daughter has called in reference to scheduling a bronchoscopy for the pt  Pt states during the last visit they discussed this procedure and is just following up on the next steps   Please advise     If you can please contact the daughter for scheduling - Nicholas Johnson  [phone]

## 2022-10-25 ENCOUNTER — HOSPITAL ENCOUNTER (OUTPATIENT)
Dept: PULMONOLOGY | Facility: HOSPITAL | Age: 77
Discharge: HOME | End: 2022-10-25
Attending: INTERNAL MEDICINE
Payer: COMMERCIAL

## 2022-10-25 DIAGNOSIS — R93.89 ABNORMAL CT OF THE CHEST: ICD-10-CM

## 2022-10-25 PROCEDURE — 94729 DIFFUSING CAPACITY: CPT | Performed by: INTERNAL MEDICINE

## 2022-10-25 PROCEDURE — 94726 PLETHYSMOGRAPHY LUNG VOLUMES: CPT

## 2022-10-25 PROCEDURE — 94060 EVALUATION OF WHEEZING: CPT | Performed by: INTERNAL MEDICINE

## 2022-10-25 PROCEDURE — 94060 EVALUATION OF WHEEZING: CPT

## 2022-10-25 PROCEDURE — 94760 N-INVAS EAR/PLS OXIMETRY 1: CPT

## 2022-10-25 PROCEDURE — 94726 PLETHYSMOGRAPHY LUNG VOLUMES: CPT | Performed by: INTERNAL MEDICINE

## 2022-10-25 PROCEDURE — 94729 DIFFUSING CAPACITY: CPT

## 2022-10-25 RX ORDER — ALBUTEROL SULFATE 2.5 MG/3ML
2.5 SOLUTION RESPIRATORY (INHALATION) ONCE
Status: COMPLETED | OUTPATIENT
Start: 2022-10-25 | End: 2022-10-25

## 2022-10-25 RX ADMIN — ALBUTEROL SULFATE 2.5 MG: 2.5 SOLUTION RESPIRATORY (INHALATION) at 08:32

## 2022-11-01 ENCOUNTER — HOSPITAL ENCOUNTER (OUTPATIENT)
Dept: RADIOLOGY | Age: 77
Discharge: HOME | End: 2022-11-01

## 2022-11-01 ENCOUNTER — PREP FOR PROCEDURE (OUTPATIENT)
Dept: PULMONOLOGY | Facility: CLINIC | Age: 77
End: 2022-11-01

## 2022-11-01 ENCOUNTER — DOCUMENTATION (OUTPATIENT)
Dept: PULMONOLOGY | Facility: CLINIC | Age: 77
End: 2022-11-01

## 2022-11-01 ENCOUNTER — TELEPHONE (OUTPATIENT)
Dept: PULMONOLOGY | Facility: CLINIC | Age: 77
End: 2022-11-01

## 2022-11-01 DIAGNOSIS — R91.1 LEFT UPPER LOBE PULMONARY NODULE: Primary | ICD-10-CM

## 2022-11-01 DIAGNOSIS — D38.1 NEOPLASM OF UNCERTAIN BEHAVIOR OF LEFT UPPER LOBE OF LUNG: ICD-10-CM

## 2022-11-01 LAB — GLUCOSE SERPL-MCNC: 130 MG/DL (ref 65–140)

## 2022-11-01 RX ORDER — ALBUTEROL SULFATE 2.5 MG/3ML
2.5 SOLUTION RESPIRATORY (INHALATION) ONCE
OUTPATIENT
Start: 2022-11-01

## 2022-11-01 NOTE — LETTER
[address] [place] [address]  [address] [facility] [address]      November 7, 2022    MRN: [id number]     Phone: [phone]     Dear Mr Tres Bliss recently had a(n) Nuclear Medicine performed on 11/1/2022 at  [address] [place] [address] that was requested by Eddie Robertson DO  The study was reviewed by a radiologist, which is a physician who specializes in medical imaging  The radiologist issued a report describing his or her findings  In that report there was a finding that the radiologist felt warranted further discussion with your health care provider and that discussion would be beneficial to you  The results were sent to Eddie Robertson DO on 11/01/2022  9:35 AM  We recommend that you contact Eddie Robertson DO at [phone] or set up an appointment to discuss the results of the imaging test  If you have already heard from Eddie Robertson DO regarding the results of your study, you can disregard this letter  This letter is not meant to alarm you, but intended to encourage you to follow-up on your results with the provider that sent you for the imaging study  In addition, we have enclosed answers to frequently asked questions by other patients who have also received a letter to review results with their health care provider (see page two)  Thank you for choosing [address] [place] [address] for your medical imaging needs  FREQUENTLY ASKED QUESTIONS    1  Why am I receiving this letter? [facility] requires us to notify patients who have findings on imaging exams that may require more testing or follow-up with a health professional within the next 3 months          2  How serious is the finding on the imaging test?  This letter is sent to all patients who may need follow-up or more testing within the next 3 months  Receiving this letter does not necessarily mean you have a life-threatening imaging finding or disease  Recommendations in the radiologist’s imaging report are general in nature and it is up to your healthcare provider to say whether those recommendations make sense for your situation  You are strongly encouraged to talk to your health care provider about the results and ask whether additional steps need to be taken  3  Where can I get a copy of the final report for my recent radiology exam?  To get a full copy of the report you can access your records online at [email or web address] or please contact [facility] at [phone] Monday through Friday between 8 am and 6 pm          4  What do I need to do now? Please contact your health care provider who requested the imaging study to discuss what further actions (if any) are needed  You may have already heard from (your ordering provider) in regard to this test in which case you can disregard this letter  NOTICE IN ACCORDANCE WITH THE [place] STATE “PATIENT TEST RESULT INFORMATION ACT OF 2018”    You are receiving this notice as a result of a determination by your diagnostic imaging service that further discussions of your test results are warranted and would be beneficial to you  The complete results of your test or tests have been or will be sent to the health care practitioner that ordered the test or tests  It is recommended that you contact your health care practitioner to discuss your results as soon as possible

## 2022-11-01 NOTE — PROGRESS NOTES
Interventional Pulmonary/ Imaging review    González Rahman y  o male  MRN [id number]    At the request of Dr Tierney Valdez, imaging was reviewed to determine if appropriate to pursue tissue sampling  CT of the chest performed on 9/22/22 shows  2 cm spiculated cavitary nodule in the left upper lobe with adjacent bronchiolectasis and parenchymal soft tissue thickening  PET scan performed on 11/1/22 shows:    Hypermetabolic irregular masslike/patchy airspace disease involving the left upper lobe with dominant 2 3 cm spiculated hypermetabolic left upper lobe lung nodule with extension of more patchy hypermetabolic airspace disease superiorly    No evidence for hypermetabolic metastatic disease  Based on imaging, it is reasonable to move forward with navigation bronchoscopy with EBUS  Patient will be scheduled for next available OR block, tentatively 11/8/22      Anticoagulation: none (ASA 81)    AYESHA Choudhury , [facility]
No

## 2022-11-04 ENCOUNTER — TELEPHONE (OUTPATIENT)
Dept: PULMONOLOGY | Facility: HOSPITAL | Age: 77
End: 2022-11-04

## 2022-11-04 NOTE — TELEPHONE ENCOUNTER
Spoke with patient and wife re: PET scan results and Dr Kathryn Tinajero request for ela bronch  Patient agrees to proceeding with procedure, however asks that we communicate with his daughter regarding timing, as she is there transportation    I called and left a message

## 2022-11-07 NOTE — RESULT ENCOUNTER NOTE
Patient scheduled for Rishabh Bronch  Coordinated with Dr Estela Hines    See prior task notes for patient updates and notifications    Aung Simon

## 2022-11-14 ENCOUNTER — PREP FOR PROCEDURE (OUTPATIENT)
Dept: INTERVENTIONAL RADIOLOGY/VASCULAR | Facility: CLINIC | Age: 77
End: 2022-11-14

## 2022-11-14 DIAGNOSIS — R93.89 ABNORMAL CT OF THE CHEST: Primary | ICD-10-CM

## 2022-11-14 DIAGNOSIS — R91.1 LUNG NODULE: Primary | ICD-10-CM

## 2022-11-14 RX ORDER — SODIUM CHLORIDE 9 MG/ML
75 INJECTION, SOLUTION INTRAVENOUS CONTINUOUS
OUTPATIENT
Start: 2022-11-14

## 2022-12-05 NOTE — PROGRESS NOTES
Name: Karoline Brody      : 1945      MRN: [id number]  Encounter Provider: Daryn Lemos MD  Encounter Date: 2022   Encounter department: [address] [place] [address]     1  Type 2 diabetes mellitus with microalbuminuria, without long-term current use of insulin (HCC)  -     metFORMIN (GLUCOPHAGE) 1000 MG tablet; Take 1 tablet (1,000 mg total) by mouth 2 (two) times a day with meals    2  CHF (congestive heart failure), NYHA class I, chronic, systolic (HCC)    3  Coronary artery disease involving native coronary artery of native heart without angina pectoris    4  Primary hypertension    5  Paroxysmal supraventricular tachycardia ([address] )    6  Chronic obstructive pulmonary disease, unspecified COPD type ([facility] [address] )    7  Abnormal CT of the chest    8  Mixed hyperlipidemia    9  Celiac artery stenosis ([address] )    10  Superior mesenteric artery stenosis ([address] )    11  Aneurysm of ascending aorta without rupture    Continue with current medications  Repeat labs  Await results of bronchoscopy  Re additional findings seen on CT scan  EGD 2021 normal  Consider Urology evaluation once Pulmonary Medicine work up completed  Monitor ascending aortic aneurysm  He is due for an eye exam  Up to date with flu vaccine  He is not interested in a COV 19 booster  OV 6 months     BMI Counseling: Body mass index is 25 85 kg/m²  The BMI is above normal  Nutrition recommendations include consuming healthier snacks, moderation in carbohydrate intake, reducing intake of saturated fat and trans fat and reducing intake of cholesterol  Subjective     Follow up visit  Here with his daughter  Medications reviewed  Type 2 DM  on Metformin 1,000 mg daily  2022 A1c  6 3   2021 Urine micro albumin 113 increased from 22 4  On ARB  No hypoglycemic events  No DPN  Last eye exam > 1 year ago  Hypertension blood pressures have been stable on  Metoprolol ER 25 mg daily and Entreso 49/51 BID   No longer on Aldactone  06/2022 creatinine 0 91  GFR 88  Electrolytes normal  02/2022  Hgb 12  3  Hyperlipidemia on Simvastatin 40 mg daily  11/2021 Lipid profile cholesterol  121  Triglycerides 78  HDL 42 LDL 63  TSH 1 43   01/2022 admission for VVI-ICD  Known non ischemic cardiomyopathy  02/2022 admission for pneumonia/syncope  CT chest/abdomen/pelvis  Left upper lobe peribronchial density which may represent bronchopneumonia  or less likely neoplasm   Mild emphysema   Hypodense left renal lesion measuring 2 1 cm likely representing a  hemorrhagic cyst   Colonic diverticulosis without evidence of diverticulitis  Review of Systems   Constitutional: Positive for unexpected weight change (10 lb weight gain from 07/2022 )  Negative for appetite change, chills and fever  Appetite improved  19 lb weight gain from 11/2021   HENT: Negative for congestion, ear pain, rhinorrhea, sore throat and trouble swallowing  Eyes: Negative for visual disturbance  Respiratory: Positive for cough (non productive  no hemoptysis) and shortness of breath (no changes)  Negative for wheezing  09/2022 CXR Left perihilar opacity is seen which may be secondary to neoplasm, infection, or focal parenchymal scarring  10/2022 CT scan chest- 2 cm spiculated cavitary nodule in the left upper lobe with adjacent bronchiolectasis and parenchymal soft tissue thickening  Peribronchial opacity was described in the left upper lobe without specific measurement on the prior outside chest CT report  11/2022 PET/CT scan- Hypermetabolic irregular masslike/patchy airspace disease involving the left upper lobe with dominant 2 3 cm spiculated hypermetabolic left upper lobe lung nodule with extension of more patchy hypermetabolic airspace disease superiorly    While the appearance is fairly similar to CT dated 2/3/2022 and could reflect a benign inflammatory process, hypermetabolic malignancy is the diagnosis of exclusion and further evaluation with tissue sampling is recommended  No evidence for hypermetabolic metastatic disease  Mild patchy hypermetabolic mural thickening of the proximal mid gastric wall  Nonspecific mural thickening of the anterior bladder wall Mild aneurysmal dilatation of the ascending thoracic aorta measuring 4 1 cm  Ex-smoker  COPD on Breo daily  Exertional dyspnea no changes  No orthopnea or PND   Cardiovascular: Negative for chest pain, palpitations and leg swelling  See HPI  History of nonischemic cardiomyopathy followed by Cardiology  11/2022 •  Left Ventricle: Left ventricle is mildly dilated  There is mild septal  asymmetric hypertrophy  Systolic function is severely decreased with an  ejection fraction of 30-35%  Global hypokinesis  There is grade I (mild)  diastolic dysfunction   Right Ventricle: Right ventricle cavity is normal  Systolic function is low normal  Device wire present in the ventricle Left Atrium: Left atrium cavity is mildly dilated   Aortic Valve: The aortic valve is trileaflet  The leaflets are mildly thickened  There is no regurgitation  Mitral Valve: There is trace regurgitation  Tricuspid Valve: There is trace regurgitation   Pulmonic Valve: The estimated pulmonary artery systolic pressure is 94 0 mmHg  Normal pulmonary artery pressure  07/2021 nuclear stress test consistent with severe nonischemic cardiomyopathy with severely dilated left ventricle  EF 27%  Severe global hypokinesis  No reversible defects to suggest ischemia    Gastrointestinal: Negative for abdominal pain, blood in stool, constipation, diarrhea, nausea and vomiting         OV 11/2021 for weight loss  12/2021 EGD normal Colonoscopy normal except for 1 polyp  12/2021 abdominal u/s normal except for bilateral renal cysts  12/2021 celiac/mesenteric duplex  aorta is patent and ectatic measuring 2 8cm at its greatest diameter  >70% stenosis in the celiac artery   Velocities decrease with inspiration which may be suggestive of median arcuate ligament syndrome  Splenic and hepatic arteries are patent   >70% stenosis in the superior mesenteric artery in a fasting state  The inferior mesenteric artery is normal and patent  Endocrine: Negative for polydipsia and polyuria  Subclinical hypothyroidism not on medication    Lab Results       Component                Value               Date                       [id number]             1 440               11/15/2021                          Genitourinary: Negative for difficulty urinating  Musculoskeletal: Positive for back pain  Negative for arthralgias and myalgias  Skin: Negative for rash  Status post resection BCC left cheek and left neck  05/2019 s/p resection SCC in situ left lower back  Allergic/Immunologic: Negative for environmental allergies  Neurological: Negative for dizziness and headaches  02/2022 CT scan head No acute hemorrhage, mass or ischemia identified  Areas of supratentorial white matter low attenuation likely chronic small vessel ischemic disease  Ventricular system, basal cisterns and extra-axial spaces: Prominent compatible   generalized parenchymal volume loss  Hematological: Negative for adenopathy  Does not bruise/bleed easily  02/2022 CBC WBC 10,500  Hgb 12  3  MCV 83  Platelets 015,184              Component                Value               Date                       WBC                      8 17                11/15/2021                 HGB                      12 0                11/15/2021                 HCT                      38 2                11/15/2021                 MCV                      85                  11/15/2021                 PLT                      418 (H)             11/15/2021                   Platelets       Date                     Value               Ref Range           Status                05/04/2021               406 (H)             149 - 390 Thou*     Final 2021               307                 149 - 390 Thou*     Final                 02/10/2020               299                 149 - 390 Thou*     Final                 Psychiatric/Behavioral: Negative for dysphoric mood and sleep disturbance         Past Medical History:   Diagnosis Date   • Angina pectoris ([facility] [address] [facility] )     "in the past"   • Basal cell carcinoma     Resolved 2017    • Colitis 2017   • COPD (chronic obstructive pulmonary disease) (HCC)    • Diabetes mellitus (HCC)     NIDDM   • Hyperlipidemia    • Hypertension    • Malignant melanoma ([facility] [address] ) 2020     Past Surgical History:   Procedure Laterality Date   • CARDIAC SURGERY      cardiac cath   • COLONOSCOPY     • HAND SURGERY Left    • KNEE ARTHROSCOPY Right    • NECK SURGERY     • WI EXC SKIN MALIG >4 CM FACE,FACIAL N/A 2016    Procedure: LEFT CHEEK BCC EXCISION; FROZEN SECTION; BILATERAL LOWER EYELIDS SUSPICIOUS LESION EXCISION; LEFT CHEEK EXTRACTION OF COMEDONES X 2;  Surgeon: Minal Guthrie MD;  Location: [facility];  Service: Plastics   • WI EXC SKIN MALIG >4 CM TRUNK,ARM,LEG Left 2019    Procedure: EXCISION WIDE LESION TRUNK/ABDOMEN/BACK;  Surgeon: Patria Claire MD;  Location: [facility];  Service: General     Family History   Problem Relation Age of Onset   • Stomach cancer Father    • Colon cancer Sister      Social History     Socioeconomic History   • Marital status: /Civil Union     Spouse name: Not on file   • Number of children: Not on file   • Years of education: Not on file   • Highest education level: Not on file   Occupational History   • Not on file   Tobacco Use   • Smoking status: Former     Packs/day: 2 00     Years: 45 00     Pack years: 90 00     Types: Cigarettes     Start date: 0     Quit date: 10/4/2017     Years since quittin 1   • Smokeless tobacco: Never   Substance and Sexual Activity   • Alcohol use: Not Currently     Alcohol/week: 1 0 standard drink     Types: 1 Cans of beer per week • Drug use: Never   • Sexual activity: Not on file   Other Topics Concern   • Not on file   Social History Narrative    Former smoker - [facility] per [facility]      Social Determinants of Health     Financial Resource Strain: Not on file   Food Insecurity: Not on file   Transportation Needs: Not on file   Physical Activity: Not on file   Stress: Not on file   Social Connections: Not on file   Intimate Partner Violence: Not on file   Housing Stability: Not on file     Current Outpatient Medications on File Prior to Visit   Medication Sig   • aspirin 81 MG tablet Take 81 mg by mouth daily     • fluticasone-vilanterol (Breo Ellipta) 100-25 mcg/inh inhaler USE 1 INHALATION BY MOUTH  DAILY   • metoprolol succinate (TOPROL-XL) 25 mg 24 hr tablet Take 1 tablet (25 mg total) by mouth daily   • sacubitril-valsartan (Entresto) 49-51 MG TABS Take 1 tablet by mouth 2 (two) times a day   • simvastatin (ZOCOR) 40 mg tablet Take 1 tablet (40 mg total) by mouth daily at bedtime for 90 days   • [DISCONTINUED] metFORMIN (GLUCOPHAGE) 1000 MG tablet Take 1 tablet (1,000 mg total) by mouth 2 (two) times a day with meals   • nitroglycerin (NITROSTAT) 0 4 mg SL tablet  (Patient not taking: Reported on 10/19/2022)   • spironolactone (ALDACTONE) 25 mg tablet  (Patient not taking: Reported on 10/19/2022)   • [DISCONTINUED] benzonatate (TESSALON) 200 MG capsule Take 1 capsule (200 mg total) by mouth 3 (three) times a day as needed for cough (Patient not taking: Reported on 10/19/2022)   • [DISCONTINUED] clotrimazole (LOTRIMIN) 1 % cream Apply topically 2 (two) times a day (Patient not taking: Reported on 10/19/2022)     No Known Allergies  Immunization History   Administered Date(s) Administered   • COVID-19 PFIZER VACCINE 0 3 ML IM 08/10/2021, 08/31/2021   • INFLUENZA 11/11/2013, 10/24/2014, 10/27/2017, 10/08/2018, 09/24/2019   • Influenza Split High Dose Preservative Free IM 10/08/2018, 09/24/2019   • Influenza, high dose seasonal 0 7 mL 11/09/2021   • Pneumococcal Conjugate 13-Valent 10/27/2017   • Pneumococcal Polysaccharide PPV23 10/27/2018   • TD (adult) Preservative Free 08/21/2019   • Td (adult), adsorbed 08/21/2019       Objective     /70 (BP Location: Right arm, Patient Position: Sitting, Cuff Size: Standard)   Pulse 80   Temp (!) 96 °F (35 6 °C)   Ht 5' 8" (1 727 m)   Wt 77 1 kg (170 lb)   SpO2 99%   BMI 25 85 kg/m²       BP Readings from Last 3 Encounters:   12/06/22 130/70   10/19/22 118/64   09/13/22 105/58     Wt Readings from Last 3 Encounters:   12/06/22 77 1 kg (170 lb)   10/19/22 76 7 kg (169 lb)   07/12/22 72 6 kg (160 lb)       Physical Exam  Vitals and nursing note reviewed  Constitutional:       General: He is not in acute distress  Appearance: He is well-developed  HENT:      Right Ear: Tympanic membrane normal       Left Ear: Tympanic membrane normal    Eyes:      General: No scleral icterus  Extraocular Movements: Extraocular movements intact  Conjunctiva/sclera: Conjunctivae normal       Pupils: Pupils are equal, round, and reactive to light  Neck:      Thyroid: No thyroid mass or thyromegaly  Vascular: No carotid bruit or JVD  Trachea: No tracheal deviation  Cardiovascular:      Rate and Rhythm: Normal rate and regular rhythm  Pulses: no weak pulses          Carotid pulses are 2+ on the right side and 2+ on the left side  Dorsalis pedis pulses are 2+ on the right side and 2+ on the left side  Posterior tibial pulses are 2+ on the right side and 2+ on the left side  Heart sounds: Normal heart sounds  No murmur heard  No gallop  Pulmonary:      Effort: Pulmonary effort is normal  No respiratory distress  Breath sounds: Normal breath sounds  No wheezing or rales  Abdominal:      General: Bowel sounds are normal  There is no distension or abdominal bruit  Palpations: Abdomen is soft  There is no hepatomegaly, splenomegaly or mass  Tenderness: There is no abdominal tenderness  There is no guarding or rebound  Musculoskeletal:      Right lower leg: No edema  Left lower leg: No edema  Feet:      Right foot:      Skin integrity: Dry skin present  No ulcer, skin breakdown, erythema, warmth or callus  Left foot:      Skin integrity: Dry skin present  No ulcer, skin breakdown, erythema, warmth or callus  Lymphadenopathy:      Cervical: No cervical adenopathy  Upper Body:      Right upper body: No supraclavicular adenopathy  Left upper body: No supraclavicular adenopathy  Skin:     Findings: No rash  Nails: There is no clubbing  Neurological:      General: No focal deficit present  Mental Status: He is alert and oriented to person, place, and time  Psychiatric:         Mood and Affect: Mood normal        Patient's shoes and socks removed  Right Foot/Ankle   Right Foot Inspection  Skin Exam: skin normal, skin intact and dry skin  No warmth, no callus, no erythema, no maceration, no abnormal color, no pre-ulcer, no ulcer and no callus  Toe Exam: ROM and strength within normal limits  No swelling and erythema    Sensory   Monofilament testing: intact    Vascular  Capillary refills: < 3 seconds  The right DP pulse is 2+  The right PT pulse is 2+  Left Foot/Ankle  Left Foot Inspection  Skin Exam: skin normal, skin intact and dry skin  No warmth, no erythema, no maceration, normal color, no pre-ulcer, no ulcer and no callus  Toe Exam: No swelling, no tenderness, no erythema and no left toe deformity  Sensory   Monofilament testing: intact    Vascular  Capillary refills: < 3 seconds  The left DP pulse is 2+  The left PT pulse is 2+       Assign Risk Category  No deformity present  No loss of protective sensation  No weak pulses  Risk: 0    Holger Florence MD

## 2022-12-06 ENCOUNTER — OFFICE VISIT (OUTPATIENT)
Dept: FAMILY MEDICINE CLINIC | Facility: CLINIC | Age: 77
End: 2022-12-06

## 2022-12-06 VITALS
HEART RATE: 80 BPM | OXYGEN SATURATION: 99 % | SYSTOLIC BLOOD PRESSURE: 130 MMHG | DIASTOLIC BLOOD PRESSURE: 70 MMHG | WEIGHT: 170 LBS | TEMPERATURE: 96 F | HEIGHT: 68 IN | BODY MASS INDEX: 25.76 KG/M2

## 2022-12-06 DIAGNOSIS — I71.21 ANEURYSM OF ASCENDING AORTA WITHOUT RUPTURE: ICD-10-CM

## 2022-12-06 DIAGNOSIS — E11.29 TYPE 2 DIABETES MELLITUS WITH MICROALBUMINURIA, WITHOUT LONG-TERM CURRENT USE OF INSULIN (HCC): Primary | ICD-10-CM

## 2022-12-06 DIAGNOSIS — I77.1 CELIAC ARTERY STENOSIS (HCC): ICD-10-CM

## 2022-12-06 DIAGNOSIS — J44.9 CHRONIC OBSTRUCTIVE PULMONARY DISEASE, UNSPECIFIED COPD TYPE (HCC): ICD-10-CM

## 2022-12-06 DIAGNOSIS — R80.9 TYPE 2 DIABETES MELLITUS WITH MICROALBUMINURIA, WITHOUT LONG-TERM CURRENT USE OF INSULIN (HCC): Primary | ICD-10-CM

## 2022-12-06 DIAGNOSIS — E78.2 MIXED HYPERLIPIDEMIA: ICD-10-CM

## 2022-12-06 DIAGNOSIS — K55.1 SUPERIOR MESENTERIC ARTERY STENOSIS (HCC): ICD-10-CM

## 2022-12-06 DIAGNOSIS — I47.1 PAROXYSMAL SUPRAVENTRICULAR TACHYCARDIA (HCC): ICD-10-CM

## 2022-12-06 DIAGNOSIS — I10 PRIMARY HYPERTENSION: ICD-10-CM

## 2022-12-06 DIAGNOSIS — I25.10 CORONARY ARTERY DISEASE INVOLVING NATIVE CORONARY ARTERY OF NATIVE HEART WITHOUT ANGINA PECTORIS: ICD-10-CM

## 2022-12-06 DIAGNOSIS — R93.89 ABNORMAL CT OF THE CHEST: ICD-10-CM

## 2022-12-06 DIAGNOSIS — I50.22 CHF (CONGESTIVE HEART FAILURE), NYHA CLASS I, CHRONIC, SYSTOLIC (HCC): ICD-10-CM

## 2022-12-06 PROBLEM — B36.9 FUNGAL RASH OF TRUNK: Status: RESOLVED | Noted: 2022-07-12 | Resolved: 2022-12-06

## 2022-12-06 PROBLEM — R49.0 HOARSENESS: Status: RESOLVED | Noted: 2022-10-19 | Resolved: 2022-12-06

## 2022-12-09 NOTE — PRE-PROCEDURE INSTRUCTIONS
Pre-procedure Instructions for Interventional Radiology  [address]  [place] [address] [phone]    You are scheduled for a/an Left Lung Mass Biopsy  On Monday 12/19/22  Your tentative arrival time is 0830  Short stay will notify you the day before your procedure with the exact arrival time and the location to arrive  To prepare for your procedure:  1  Please arrange for someone to drive you home after the procedure and stay with you until the next morning if you are instructed to do so  This is typically for patients receiving some type of sedative or anesthetic for the procedure  2  DO NOT EAT OR DRINK ANYTHING after midnight on the evening before your procedure including candy & gum   3  ONLY SIPS OF WATER with your medications are allowed on the morning of your procedure  4  TAKE ALL OF YOUR REGULAR MEDICATIONS THE MORNING OF YOUR PROCEDURE with sips of water! We may call you to stop some of your blood sugar, blood pressure and blood thinning medications depending on the procedure  Please take all of these medications unless we instruct you to stop them  5  If you have an allergy to x-ray dye, please contact Interventional Radiology for an x-ray dye preparation which usually consists of an oral steroid and Benadryl  The day of your procedure:  1  Bring a list of the medications you take at home  2  Bring medications you take for breathing problems (such as inhalers), medications for chest pain, or both  3  Bring a case for your glasses or contacts  4  Bring your insurance card and a form of photo ID   5  Please leave all valuables such as credit cards and jewelry at home  6  Report to the registration desk in the main lobby at the [facility], [facility]  Ask to be directed to [facility]  7  While your procedure is being performed, your family may wait in the Radiology Waiting Room on the 1st floor in Radiology  if they need to leave, they may provide a number to be called following the procedure  8  Be prepared to stay overnight just in case  Sometimes procedures will indicate the need for further observation or treatment  9  If you are scheduled for a follow-up visit with the Interventional Radiologist after your procedure, you will be called with a date and time  10  Covid Vaccine and Booster completed  Special Instructions (Medications to stop taking before your procedure etc ):  ASA 81 mg  LD 12/13/22 and restart 12/20/22;hold AM metformin 12/19/22  Above reviewed with his daughter Shruti Carreon

## 2022-12-19 ENCOUNTER — HOSPITAL ENCOUNTER (OUTPATIENT)
Dept: RADIOLOGY | Facility: HOSPITAL | Age: 77
Discharge: HOME | End: 2022-12-19
Attending: INTERNAL MEDICINE

## 2022-12-19 ENCOUNTER — HOSPITAL ENCOUNTER (OUTPATIENT)
Dept: RADIOLOGY | Facility: HOSPITAL | Age: 77
Discharge: HOME | End: 2022-12-19
Attending: RADIOLOGY

## 2022-12-19 VITALS
BODY MASS INDEX: 25.76 KG/M2 | RESPIRATION RATE: 18 BRPM | DIASTOLIC BLOOD PRESSURE: 67 MMHG | WEIGHT: 170 LBS | HEIGHT: 68 IN | SYSTOLIC BLOOD PRESSURE: 120 MMHG | HEART RATE: 82 BPM | TEMPERATURE: 97.5 F | OXYGEN SATURATION: 98 %

## 2022-12-19 DIAGNOSIS — R91.1 LUNG NODULE: ICD-10-CM

## 2022-12-19 LAB
ANION GAP SERPL CALCULATED.3IONS-SCNC: 5 MMOL/L (ref 4–13)
BUN SERPL-MCNC: 29 MG/DL (ref 5–25)
CALCIUM SERPL-MCNC: 9.7 MG/DL (ref 8.3–10.1)
CHLORIDE SERPL-SCNC: 110 MMOL/L (ref 96–108)
CO2 SERPL-SCNC: 25 MMOL/L (ref 21–32)
CREAT SERPL-MCNC: 1.33 MG/DL (ref 0.6–1.3)
ERYTHROCYTE [DISTWIDTH] IN BLOOD BY AUTOMATED COUNT: 13.6 % (ref 11.6–15.1)
GFR SERPL CREATININE-BSD FRML MDRD: 51 ML/MIN/1.73SQ M
GLUCOSE P FAST SERPL-MCNC: 130 MG/DL (ref 65–99)
GLUCOSE SERPL-MCNC: 130 MG/DL (ref 65–140)
HCT VFR BLD AUTO: 49.1 % (ref 36.5–49.3)
HGB BLD-MCNC: 16 G/DL (ref 12–17)
INR PPP: 0.87 (ref 0.84–1.19)
MCH RBC QN AUTO: 29 PG (ref 26.8–34.3)
MCHC RBC AUTO-ENTMCNC: 32.6 G/DL (ref 31.4–37.4)
MCV RBC AUTO: 89 FL (ref 82–98)
PLATELET # BLD AUTO: 355 THOUSANDS/UL (ref 149–390)
PMV BLD AUTO: 8.9 FL (ref 8.9–12.7)
POTASSIUM SERPL-SCNC: 4.6 MMOL/L (ref 3.5–5.3)
PROTHROMBIN TIME: 12 SECONDS (ref 11.6–14.5)
RBC # BLD AUTO: 5.51 MILLION/UL (ref 3.88–5.62)
SODIUM SERPL-SCNC: 140 MMOL/L (ref 135–147)
WBC # BLD AUTO: 10.92 THOUSAND/UL (ref 4.31–10.16)

## 2022-12-19 RX ORDER — SODIUM CHLORIDE 9 MG/ML
75 INJECTION, SOLUTION INTRAVENOUS CONTINUOUS
Status: DISCONTINUED | OUTPATIENT
Start: 2022-12-19 | End: 2022-12-20 | Stop reason: HOSPADM

## 2022-12-19 RX ORDER — LIDOCAINE HYDROCHLORIDE 10 MG/ML
INJECTION, SOLUTION EPIDURAL; INFILTRATION; INTRACAUDAL; PERINEURAL AS NEEDED
Status: COMPLETED | OUTPATIENT
Start: 2022-12-19 | End: 2022-12-19

## 2022-12-19 RX ORDER — MIDAZOLAM HYDROCHLORIDE 2 MG/2ML
INJECTION, SOLUTION INTRAMUSCULAR; INTRAVENOUS AS NEEDED
Status: COMPLETED | OUTPATIENT
Start: 2022-12-19 | End: 2022-12-19

## 2022-12-19 RX ORDER — FENTANYL CITRATE 50 UG/ML
INJECTION, SOLUTION INTRAMUSCULAR; INTRAVENOUS AS NEEDED
Status: COMPLETED | OUTPATIENT
Start: 2022-12-19 | End: 2022-12-19

## 2022-12-19 RX ADMIN — SODIUM CHLORIDE 75 ML/HR: 0.9 INJECTION, SOLUTION INTRAVENOUS at 08:32

## 2022-12-19 RX ADMIN — LIDOCAINE HYDROCHLORIDE 10 ML: 10 INJECTION, SOLUTION EPIDURAL; INFILTRATION; INTRACAUDAL; PERINEURAL at 10:59

## 2022-12-19 RX ADMIN — FENTANYL CITRATE 50 MCG: 50 INJECTION INTRAMUSCULAR; INTRAVENOUS at 10:54

## 2022-12-19 RX ADMIN — MIDAZOLAM 1 MG: 1 INJECTION INTRAMUSCULAR; INTRAVENOUS at 10:54

## 2022-12-19 RX ADMIN — FENTANYL CITRATE 50 MCG: 50 INJECTION INTRAMUSCULAR; INTRAVENOUS at 10:48

## 2022-12-19 RX ADMIN — MIDAZOLAM 1 MG: 1 INJECTION INTRAMUSCULAR; INTRAVENOUS at 10:48

## 2022-12-19 RX ADMIN — FENTANYL CITRATE 25 MCG: 50 INJECTION INTRAMUSCULAR; INTRAVENOUS at 10:59

## 2022-12-19 RX ADMIN — MIDAZOLAM 0.5 MG: 1 INJECTION INTRAMUSCULAR; INTRAVENOUS at 10:59

## 2022-12-19 NOTE — H&P
Interventional Radiology  History and Physical 12/19/2022     Candice Graves   1945   [id number]    Assessment/Plan:    78year old male with PET-hot left upper lung nodule  Plan for CT guided biopsy  Procedure, risks, benefits and alternatives were discussed with the patient  Consent obtained at bedside  /82   Pulse 82   Temp 97 9 °F (36 6 °C) (Temporal)   Resp 18   Ht 5' 8" (1 727 m)   Wt 77 1 kg (170 lb)   SpO2 97%   BMI 25 85 kg/m²       Problem List Items Addressed This Visit    None  Visit Diagnoses     Lung nodule        Relevant Orders    IR biopsy lung             Subjective: Normal state of health, no acute complaints  Patient ID: Candice Graves is a 78 y o  male  History of Present Illness  See A/P above  Review of Systems   Respiratory: Negative  Cardiovascular: Negative            Past Medical History:   Diagnosis Date   • Angina pectoris ([address] )     "in the past"   • Basal cell carcinoma     Resolved 8/25/2017    • Colitis 11/22/2017   • COPD (chronic obstructive pulmonary disease) (HCC)    • Diabetes mellitus (HCC)     NIDDM   • Hyperlipidemia    • Hypertension    • Malignant melanoma ([address] ) 2/24/2020        Past Surgical History:   Procedure Laterality Date   • CARDIAC SURGERY  2014    cardiac cath   • COLONOSCOPY     • HAND SURGERY Left    • KNEE ARTHROSCOPY Right    • NECK SURGERY     • MT EXC SKIN MALIG >4 CM FACE,FACIAL N/A 4/29/2016    Procedure: LEFT CHEEK BCC EXCISION; FROZEN SECTION; BILATERAL LOWER EYELIDS SUSPICIOUS LESION EXCISION; LEFT CHEEK EXTRACTION OF COMEDONES X 2;  Surgeon: Raudel Terrell MD;  Location: [facility];  Service: Plastics   • MT EXC SKIN MALIG >4 CM TRUNK,ARM,LEG Left 5/30/2019    Procedure: EXCISION WIDE LESION TRUNK/ABDOMEN/BACK;  Surgeon: Estefani Darden MD;  Location: [facility];  Service: General        Social History     Tobacco Use   Smoking Status Former   • Packs/day: 2 00   • Years: 45 00   • Pack years: 90 00   • Types: Cigarettes   • Start date: 0   • Quit date: 10/4/2017   • Years since quittin 2   Smokeless Tobacco Never        Social History     Substance and Sexual Activity   Alcohol Use Not Currently   • Alcohol/week: 1 0 standard drink   • Types: 1 Cans of beer per week        Social History     Substance and Sexual Activity   Drug Use Never        No Known Allergies    Current Outpatient Medications   Medication Sig Dispense Refill   • aspirin 81 MG tablet Take 81 mg by mouth daily  • fluticasone-vilanterol (Breo Ellipta) 100-25 mcg/inh inhaler USE 1 INHALATION BY MOUTH  DAILY 84 each 3   • metFORMIN (GLUCOPHAGE) 1000 MG tablet Take 1 tablet (1,000 mg total) by mouth 2 (two) times a day with meals 180 tablet 3   • metoprolol succinate (TOPROL-XL) 25 mg 24 hr tablet Take 1 tablet (25 mg total) by mouth daily 90 tablet 3   • nitroglycerin (NITROSTAT) 0 4 mg SL tablet      • sacubitril-valsartan (Entresto) 49-51 MG TABS Take 1 tablet by mouth 2 (two) times a day     • spironolactone (ALDACTONE) 25 mg tablet      • simvastatin (ZOCOR) 40 mg tablet Take 1 tablet (40 mg total) by mouth daily at bedtime for 90 days 90 tablet 3     Current Facility-Administered Medications   Medication Dose Route Frequency Provider Last Rate Last Admin   • sodium chloride 0 9 % infusion  75 mL/hr Intravenous Continuous Raul Treviño MD 75 mL/hr at 22 0832 75 mL/hr at 22 0832          Objective:    Vitals:    22 0839   BP: 131/82   Pulse: 82   Resp: 18   Temp: 97 9 °F (36 6 °C)   TempSrc: Temporal   SpO2: 97%   Weight: 77 1 kg (170 lb)   Height: 5' 8" (1 727 m)        Physical Exam  Cardiovascular:      Rate and Rhythm: Normal rate and regular rhythm     Pulmonary:      Effort: Pulmonary effort is normal            No results found for: BNP   Lab Results   Component Value Date    WBC 10 92 (H) 2022    HGB 16 0 2022    HCT 49 1 2022    MCV 89 2022     2022     No results found for: INR, PROTIME  No results found for: PTT      I have personally reviewed pertinent imaging and laboratory results  Code Status: No Order  Advance Directive and Living Will:      Power of :    POLST:      This text is generated with voice recognition software  There may be translation, syntax,  or grammatical errors  If you have any questions, please contact the dictating provider

## 2022-12-19 NOTE — SEDATION DOCUMENTATION
Left upper lobe lung biopsy completed by Dr Stacey Mariee without complications  Patient tolerated procedure well  Left upper chest puncture site covered with a band-aid  Bedrest start time 1130  CXR in 1 hour  Report given to [facility]

## 2022-12-19 NOTE — BRIEF OP NOTE (RAD/CATH)
INTERVENTIONAL RADIOLOGY PROCEDURE NOTE    Date: 12/19/2022    Procedure:   Procedure Summary     Date: 12/19/22 Room / Location: [address] [place] [address]    Anesthesia Start:  Anesthesia Stop:     Procedure: IR BIOPSY LUNG Diagnosis:       Lung nodule      (Hypermetabolic left upper lobe lung nodule )    Scheduled Providers: Yun Golden MD Responsible Provider:     Anesthesia Type: Not recorded ASA Status: Not recorded          Preoperative diagnosis:   1  Lung nodule         Postoperative diagnosis: Same  Surgeon: Yun Golden MD     Assistant: None  No qualified resident was available  Blood loss: Minimal    Specimens: Three 18g 1 3cm cores  Findings:     CT guided left upper lung nodule biopsy  Complications: None immediate  No PTX, tiny regional hemorrhage       Anesthesia: conscious sedation

## 2022-12-19 NOTE — DISCHARGE INSTRUCTIONS
Moderate Sedation   WHAT YOU NEED TO KNOW:   Moderate sedation, or conscious sedation, is medicine used during procedures to help you feel relaxed and calm  You will be awake and able to follow directions without anxiety or pain  You will remember little to none of the procedure  You may feel tired, weak, or unsteady on your feet after you get sedation  You may also have trouble concentrating or short-term memory loss  These symptoms should go away in 24 hours or less  DISCHARGE INSTRUCTIONS:   Call 911 or have someone else call for any of the following: You have sudden trouble breathing  You cannot be woken  Seek care immediately if:   You have a severe headache or dizziness  Your heart is beating faster than usual   Contact your healthcare provider if:   You have a fever  You have nausea or are vomiting for more than 8 hours after the procedure  Your skin is itchy, swollen, or you have a rash  You have questions or concerns about your condition or care  Self-care:   Have someone stay with you for 24 hours  This person can drive you to errands and help you do things around the house  This person can also watch for problems  Rest and do quiet activities for 24 hours  Do not exercise, ride a bike, or play sports  Stand up slowly to prevent dizziness and falls  Take short walks around the house with another person  Slowly return to your usual activities the next day  Do not drive or use dangerous machines or tools for 24 hours  You may injure yourself or others  Examples include a lawnmower, saw, or drill  Do not return to work for 24 hours if you use dangerous machines or tools for work  Do not make important decisions for 24 hours  For example, do not sign important papers or invest money  Drink liquids as directed  Liquids help flush the sedation medicine out of your body  Ask how much liquid to drink each day and which liquids are best for you        Eat small, frequent meals to prevent nausea and vomiting  Start with clear liquids such as juice or broth  If you do not vomit after clear liquids, you can eat your usual foods  Do not drink alcohol or take medicines that make you drowsy  This includes medicines that help you sleep and anxiety medicines  Ask your healthcare provider if it is safe for you to take pain medicine  Follow up with your healthcare provider as directed: Write down your questions so you remember to ask them during your visits  © 2017 [address]  Information is for End User's use only and may not be sold, redistributed or otherwise used for commercial purposes  All illustrations and images included in CareNotes® are the copyrighted property of [employer]  or [employer]  The above information is an  only  It is not intended as medical advice for individual conditions or treatments  Talk to your doctor, nurse or pharmacist before following any medical regimen to see if it is safe and effective for you  Needle Biopsy of the Lung    WHAT YOU NEED TO KNOW:  A needle biopsy of the lung is a procedure to remove cells or tissue from your lung  You may have a fine needle aspiration biopsy (FNAB), or a core needle biopsy (CNB)  A FNAB is used to remove cells through a thin needle  CNB uses a thicker needle to remove lung tissue  The samples are collected and tested for inflammation, infection, or cancer  DISCHARGE INSTRUCTIONS:   Resume your normal diet  Small sips of flat soda will help with nausea  Limit your activity for 24 hours  Wound Care:      - Remove band aid in 24 hours      Contact Interventional Radiology at [phone] Katharine PATIENTS: Contact Interventional Radiology at [phone]) Myriam Castillo PATIENTS: Contact Interventional Radiology at [phone]) if any of the following occur:    - You have a fever greater than 101*    - You cough up large amounts of bright red blood - You have chest pain with breathing    - You have shortness of breath    -You have persistent nausea and vomiting    - You have pus, redness or swelling around your biopsy site    - You have questions or concerns about your condition or care

## 2022-12-21 DIAGNOSIS — C34.12 MALIGNANT NEOPLASM OF UPPER LOBE OF LEFT LUNG (HCC): Primary | ICD-10-CM

## 2022-12-21 NOTE — RESULT ENCOUNTER NOTE
Result Note    I called and advised patient biopsy results were back, he desired to discuss over the phone and his wife was present  We reviewed biopsy consistent with lung cancer, adenocarcinoma type  We reviewed that currently appears to be early stage lung cancer either State I or Stage II (currently Stage I) given lack of adenopathy on CT/PET  We discussed potential options include surgical resection (normal PFTs) or radiation therapy  I advised he should see Thoracic surgery for evaluation and discussion on resection options given his underlying cardiac disease  Both were in agreement and all questions answered  Referral to Thoracic surgery placed  Office to please assist in scheduling  Will present at Thoracic Tumor Board      Gris Sam DO

## 2022-12-22 ENCOUNTER — DOCUMENTATION (OUTPATIENT)
Dept: HEMATOLOGY ONCOLOGY | Facility: CLINIC | Age: 77
End: 2022-12-22

## 2022-12-22 NOTE — PROGRESS NOTES
Chart reviewed in preparation of Thoracic Tumor Conference presentation by Dr Charlie Barbosa on 1/9/2023  Patient had pneumonia in September and was found to have DEEPALI infiltrate on CXR  Chest CT demonstrated DEEPALI spiculated cavitary nodule  11/1/22 PET/CT showed hypermetabolic irregular masslike/patchy airspace disease involving the left upper lobe with dominant 2 3 cm spiculated hypermetabolic left upper lobe lung nodule with extension of more patchy hypermetabolic airspace disease superiorly  No evidence for hypermetabolic metastatic disease  He underwent IR biopsy on 12/19/22 with pathology positive for NSCLC~adenocarcinoma

## 2022-12-28 ENCOUNTER — OFFICE VISIT (OUTPATIENT)
Dept: CARDIAC SURGERY | Facility: CLINIC | Age: 77
End: 2022-12-28

## 2022-12-28 VITALS
HEART RATE: 87 BPM | SYSTOLIC BLOOD PRESSURE: 126 MMHG | HEIGHT: 68 IN | DIASTOLIC BLOOD PRESSURE: 79 MMHG | RESPIRATION RATE: 17 BRPM | WEIGHT: 171.3 LBS | BODY MASS INDEX: 25.96 KG/M2 | TEMPERATURE: 97.8 F | OXYGEN SATURATION: 99 %

## 2022-12-28 DIAGNOSIS — C34.12 MALIGNANT NEOPLASM OF UPPER LOBE OF LEFT LUNG (HCC): ICD-10-CM

## 2022-12-28 DIAGNOSIS — J43.9 PULMONARY EMPHYSEMA, UNSPECIFIED EMPHYSEMA TYPE (HCC): Primary | ICD-10-CM

## 2022-12-28 NOTE — ASSESSMENT & PLAN NOTE
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 which appears to be clinical stage I based on the PET-CT scan  We discussed treatment options including surgery versus radiation  His PFTs show a reduced DLCO  Despite this, his numbers do indicate that he could still potentially be a surgical candidate  He has shortness of breath on little exertion so we would recommend obtaining a CPET before making the determination  He is established with a cardiologist at [facility] and he will need to see them for cardiac risk assessment  Radiation therapy was also presented fully  After a long discussion, we will proceed with CPET and cardiology appointment and then he will return

## 2022-12-28 NOTE — PROGRESS NOTES
Thoracic Consult  Assessment/Plan:    Malignant neoplasm of upper lobe of left lung 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 which appears to be clinical stage I based on the PET-CT scan  We discussed treatment options including surgery versus radiation  His PFTs show a reduced DLCO  Despite this, his numbers do indicate that he could still potentially be a surgical candidate  He has shortness of breath on little exertion so we would recommend obtaining a CPET before making the determination  He is established with a cardiologist at [facility] and he will need to see them for cardiac risk assessment  Radiation therapy was also presented fully  After a long discussion, we will proceed with CPET and cardiology appointment and then he will return  Diagnoses and all orders for this visit:    Pulmonary emphysema, unspecified emphysema type ([facility] [address] )  -     Pulmonary exercise stress test (Cardiopulmonary exercise stress test); Future    Malignant neoplasm of upper lobe of left lung [facility])  -     Ambulatory Referral to Thoracic Surgery  -     Pulmonary exercise stress test (Cardiopulmonary exercise stress test); Future          Thoracic History   Diagnosis: Adenocarcinoma left upper lobe   Procedures/Surgeries:    Pathology:    Adjuvant Therapy:       Subjective:    Patient ID: Kush Acosta is a 78 y o  male  HPI   Mr Mary Taylor is a 78year old man with a PMH of DM type 2, COPD, cardiomyopathy, CHF, CAD, h/o melanoma on face and back s/p resections, former 80 pack year smoker quit in 2017 who has been referred to our office for evaluation of a lung cancer  Chest CT 9/20/22 revealed a 2x1 4cm spiculated left upper lobe nodule  PET-CT 11/1/22 demonstrated uptake in the mass with an SUV 7 5 with extension of more patchy airspace disease superiorly SUV 4   There is patchy hypermetabolic mural thickening of gastric wall SUV 4 8  there is asymmetric uptake to the anterior left pubis symphysis SUV 4 9  There is mural thickening of the bladder wall was well  IR biopsy was performed on 12/19/22 and pathology was consistent with adenocarcinoma of the lung  PFTs 10/25/22 show an FEV1 112% and DLCO 58%  He sees a cardiologist at [facility], last seen 11/18/22  Echo Jun 2021 with decreased EF 30-35%  He had an AICD device check recently which was normal    On discussion, he reports shortness of breath on exertion especially with stair climbing  He gets pretty wiped out with grocery shopping per his family  He has a dry cough, no hemoptysis  He lost weight last year but has been gaining back  He keeps his house temperature pretty high because he is oftentimes cold       The following portions of the patient's history were reviewed and updated as appropriate: allergies, current medications, past family history, past medical history, past social history, past surgical history and problem list     Past Medical History:   Diagnosis Date   • Angina pectoris ([facility] [address] )     "in the past"   • Basal cell carcinoma     Resolved 8/25/2017    • Colitis 11/22/2017   • COPD (chronic obstructive pulmonary disease) ([facility] [address] )    • Diabetes mellitus ([facility] [address] )     NIDDM   • Hyperlipidemia    • Hypertension    • Malignant melanoma ([facility] [address] ) 2/24/2020      Past Surgical History:   Procedure Laterality Date   • CARDIAC SURGERY  2014    cardiac cath   • COLONOSCOPY     • HAND SURGERY Left    • IR BIOPSY LUNG  12/19/2022   • KNEE ARTHROSCOPY Right    • NECK SURGERY     • WV EXCISION MALIGNANT LESION F/E/E/N/L >4 0 CM N/A 4/29/2016    Procedure: LEFT CHEEK BCC EXCISION; FROZEN SECTION; BILATERAL LOWER EYELIDS SUSPICIOUS LESION EXCISION; LEFT CHEEK EXTRACTION OF COMEDONES X 2;  Surgeon: Cass Strauss MD;  Location: [facility];  Service: Plastics   • WV EXCISION MALIGNANT LESION TRUNK/ARM/LEG > 4 0 CM Left 5/30/2019    Procedure: EXCISION WIDE LESION TRUNK/ABDOMEN/BACK;  Surgeon: Briana Vale MD;  Location: [facility];  Service: General      Family History   Problem Relation Age of Onset   • Stomach cancer Father    • Colon cancer Sister       Social History     Socioeconomic History   • Marital status: /Civil Union     Spouse name: Not on file   • Number of children: Not on file   • Years of education: Not on file   • Highest education level: Not on file   Occupational History   • Not on file   Tobacco Use   • Smoking status: Former     Packs/day: 2 00     Years: 45 00     Pack years: 90 00     Types: Cigarettes     Start date: 0     Quit date: 10/4/2017     Years since quittin 2   • Smokeless tobacco: Never   Substance and Sexual Activity   • Alcohol use: Not Currently     Alcohol/week: 1 0 standard drink     Types: 1 Cans of beer per week   • Drug use: Never   • Sexual activity: Not on file   Other Topics Concern   • Not on file   Social History Narrative    Former smoker - As per [facility]      Social Determinants of Health     Financial Resource Strain: Not on file   Food Insecurity: Not on file   Transportation Needs: Not on file   Physical Activity: Not on file   Stress: Not on file   Social Connections: Not on file   Intimate Partner Violence: Not on file   Housing Stability: Not on file      Current Outpatient Medications   Medication Instructions   • aspirin 81 mg, Oral, Daily   • fluticasone-vilanterol (Breo Ellipta) 100-25 mcg/inh inhaler USE 1 INHALATION BY MOUTH  DAILY   • metFORMIN (GLUCOPHAGE) 1,000 mg, Oral, 2 times daily with meals   • metoprolol succinate (TOPROL-XL) 25 mg, Oral, Daily   • nitroglycerin (NITROSTAT) 0 4 mg SL tablet No dose, route, or frequency recorded  • sacubitril-valsartan (Entresto) 49-51 MG TABS 1 tablet, Oral, 2 times daily   • simvastatin (ZOCOR) 40 mg, Oral, Daily at bedtime   • spironolactone (ALDACTONE) 25 mg tablet No dose, route, or frequency recorded  No Known Allergies    Review of Systems   Constitutional: Positive for fatigue  Eyes: Negative      Respiratory: Positive for cough and shortness of breath  Cardiovascular: Negative  Gastrointestinal: Negative  Musculoskeletal: Negative  Skin: Negative  Neurological: Negative  Hematological: Negative  Psychiatric/Behavioral: Negative  Objective:   Physical Exam  Constitutional:       Appearance: Normal appearance  HENT:      Head: Normocephalic and atraumatic  Eyes:      General: No scleral icterus  Conjunctiva/sclera: Conjunctivae normal    Cardiovascular:      Rate and Rhythm: Normal rate and regular rhythm  Pulmonary:      Effort: Pulmonary effort is normal       Breath sounds: Wheezing present  Abdominal:      General: There is no distension  Palpations: Abdomen is soft  Musculoskeletal:      Cervical back: Neck supple  Right lower leg: No edema  Left lower leg: No edema  Lymphadenopathy:      Cervical: No cervical adenopathy  Skin:     General: Skin is warm and dry  Neurological:      General: No focal deficit present  Mental Status: He is alert and oriented to person, place, and time  Psychiatric:         Mood and Affect: Mood normal      /79 (BP Location: Left arm, Patient Position: Sitting, Cuff Size: Standard)   Pulse 87   Temp 97 8 °F (36 6 °C) (Temporal)   Resp 17   Ht 5' 8" (1 727 m)   Wt 77 7 kg (171 lb 4 8 oz)   SpO2 99%   BMI 26 05 kg/m²     XR chest pa & lateral    Result Date: 9/13/2022  Impression Left perihilar opacity is seen which may be secondary to neoplasm, infection, or focal parenchymal scarring  Consider CT chest for further evaluation  Lungs otherwise clear without airspace consolidation  The study was marked in EPIC for significant notification  Workstation performed: [id number]      CT chest wo contrast    Result Date: 9/20/2022  Narrative CT CHEST WITHOUT IV CONTRAST INDICATION:   R93 89: Abnormal findings on diagnostic imaging of other specified body structures   COMPARISON:  Two-view chest 9/13/2022 and outside CT chest report 2/3/2022 TECHNIQUE: CT examination of the chest was performed without intravenous contrast  Axial, sagittal, and coronal 2D reformatted images were created from the source data and submitted for interpretation  Radiation dose length product (DLP) for this visit:  204 mGy-cm   This examination, like all CT scans performed in the [facility], was performed utilizing techniques to minimize radiation dose exposure, including the use of iterative reconstruction and automated exposure control  FINDINGS: LUNGS:  2 x 1 4 cm spiculated cavitary nodule in the left upper lobe with adjacent focal bronchiolectasis and peribronchial soft tissue thickening  Few additional punctate pulmonary nodules; representative nodules will be measured/marked on series 3: Image 46, left lower lobe, 2 mm  Image 79, right lower lobe, 4 mm  Punctate calcific granuloma left lower lobe  COPD  No infiltrate  Central airways are clear  PLEURA:  Unremarkable  HEART/GREAT VESSELS: Heart is not enlarged  Distal cardiac pacer/defibrillator lead in place  Trace fluid pooling in the anterior pericardial sac  Minimal aortic calcification  No thoracic aortic aneurysm  MEDIASTINUM AND DELANO:  Unremarkable  CHEST WALL AND LOWER NECK:  Minimal bilateral gynecomastia  Left anterior chest wall cardiac pacer  VISUALIZED STRUCTURES IN THE UPPER ABDOMEN:  Punctate hepatic calcified granuloma  OSSEOUS STRUCTURES:  No acute fracture or osseous destructive lesion identified  Mild degenerative changes of the spine  Partially visualized cervical spine fusion hardware  Impression 2 cm spiculated cavitary nodule in the left upper lobe with adjacent bronchiolectasis and parenchymal soft tissue thickening  Peribronchial opacity was described in the left upper lobe without specific measurement on the prior outside chest CT report; images unavailable for review  Follow-up with CT PET or tissue sampling is advised   If outside CT study is made available for review and the irregular opacity is found to be stable or smaller, short-term CT surveillance in 3 months may be considered  Otherwise recommendation is as above  COPD  This study demonstrates a significant  finding and was documented as such in [facility] for liaison and referring practitioner notification  Workstation performed: [id number]     CT chest abdomen pelvis w contrast    Result Date: 10/19/2022  Narrative 1 2 [id number]  2 069 8 [id number] 175     NM PET CT skull base to mid thigh    Result Date: 11/1/2022  Narrative PET/CT SCAN INDICATION:  D38 1: Neoplasm of uncertain behavior of trachea, bronchus and lung   , left upper lobe lung nodule for evaluation MODIFIER: PI COMPARISON: CT of chest dated 9/20/2022 and CT of chest, abdomen, and pelvis dated 2/3/2022 CELL TYPE:  Not applicable TECHNIQUE:   8 4 mCi F-18-FDG administered IV  Multiplanar attenuation corrected and non attenuation corrected PET images were acquired 60 minutes post injection  Contiguous, low dose, axial CT sections were obtained from the skull vertex through the femurs   Intravenous contrast material was not utilized  This examination, like all CT scans performed in the [facility], was performed utilizing techniques to minimize radiation dose exposure, including the use of iterative reconstruction and automated exposure control  Fasting serum glucose: 130 mg/dl FINDINGS: VISUALIZED BRAIN:   No acute abnormalities are seen  HEAD/NECK:   There is a physiologic distribution of FDG  No FDG avid cervical adenopathy is seen  CT images: Unremarkable  CHEST:   There is irregular masslike/patchy hypermetabolic airspace disease involving the left upper lobe with dominant 2 3 cm spiculated left upper lobe nodule on image 121 of series 3 with maximum SUV of 7 5 with extension of more patchy hypermetabolic airspace  disease extending superiorly with max SUV of 4 0 on image 117/18 of series 3    Findings appear fairly similar to CT dated 2/3/2022  While the lack of significant CT progression since 2/3/2022 could reflect an underlying benign inflammatory process, hypermetabolic malignancy is the diagnosis of exclusion and further evaluation with tissue sampling is recommended as clinically indicated  CT images: Left-sided transvenous cardiac device is present  Bilateral gynecomastia  Mild aneurysmal dilatation of the ascending thoracic aorta measuring approximately 4 1 cm  Atherosclerotic vascular calcifications are noted  Mild emphysematous changes  ABDOMEN:   Patchy hypermetabolic mural thickening of the proximal to mid gastric wall with max SUV of approximately 4 8  CT images: Left renal hypodensities are again noted, some of which cannot be characterized to reflect simple cysts and are poorly characterized on low-dose unenhanced CT; findings can be further characterized with ultrasound as clinically indicated  Stable left adrenal nodularity  Atherosclerotic vascular calcifications are noted  Diverticulosis coli  PELVIS: Asymmetric focal FDG activity anterior to the left pubis symphysis on image 273 with maximum SUV of 4 9 without definite soft tissue correlate and favored to reflect a benign musculoskeletal process with hypermetabolic soft tissue malignancy considered less likely; this finding can be further characterized with MRI of the pelvis as clinically indicated  Mild nonspecific nonfocal patchy FDG activity involving enlarged prostate gland of uncertain clinical significance  CT images: Nonspecific mural thickening of the anterior urinary bladder wall (for example image 263 of series 3) which could be related to underdistention with underlying urothelial process not excluded  OSSEOUS STRUCTURES: No FDG avid lesions are seen  CT images: Degenerative changes are noted involving the spine  Anterior cervical fusion device  Impression 1    Hypermetabolic irregular masslike/patchy airspace disease involving the left upper lobe with dominant 2 3 cm spiculated hypermetabolic left upper lobe lung nodule with extension of more patchy hypermetabolic airspace disease superiorly  While the appearance is fairly similar to CT dated 2/3/2022 and could reflect a benign inflammatory process, hypermetabolic malignancy is the diagnosis of exclusion and further evaluation with tissue sampling is recommended  No evidence for hypermetabolic metastatic disease  2   Mild patchy hypermetabolic mural thickening of the proximal mid gastric wall for which correlation is recommended to exclude underlying gastric mucosal process  3   Nonspecific mural thickening of the anterior bladder wall which could be related to underdistention with underlying urothelial process not excluded  Consider urologic consultation  4   Mild aneurysmal dilatation of the ascending thoracic aorta measuring 4 1 cm  Annual follow-up is recommended  Please see above for details and multiple additional findings  The study was marked in EPIC for significant notification   Workstation performed: [id number]

## 2023-01-05 ENCOUNTER — DOCUMENTATION (OUTPATIENT)
Dept: HEMATOLOGY ONCOLOGY | Facility: CLINIC | Age: 78
End: 2023-01-05

## 2023-01-05 NOTE — PROGRESS NOTES
In-basket message received from Dr Jojo Mario to add pt to 1/9/23 tumor board  Chart Reviewed and tumor board prep completed

## 2023-01-09 ENCOUNTER — DOCUMENTATION (OUTPATIENT)
Dept: HEMATOLOGY ONCOLOGY | Facility: CLINIC | Age: 78
End: 2023-01-09

## 2023-01-09 NOTE — PROGRESS NOTES
THORACIC ONCOLOGY MULTIDISCIPLINARY CASE REVIEW    DATE:  1/9/2023    PRESENTING DOCTOR:  Dr Bhargav Riddle    DIAGNOSIS:  NSCLC~adenocarcinoma  STAGING:  Stage I    Sravani Shen is a 78 y o  male who was presented at the Thoracic Oncology Multidisciplinary Tumor Conference today  He had pneumonia in September and was found to have DEEPALI infiltrate on CXR  Chest CT demonstrated DEEPALI spiculated cavitary nodule  11/1/22 PET/CT showed hypermetabolic irregular masslike/patchy airspace disease involving the left upper lobe with dominant 2 3 cm spiculated hypermetabolic left upper lobe lung nodule with extension of more patchy hypermetabolic airspace disease superiorly  No evidence for hypermetabolic metastatic disease  He underwent IR biopsy on 12/19/22 with pathology positive for NSCLC~adenocarcinoma  PHYSICIAN RECOMMENDED PLAN:  Cardiopulmonary exercise stress test scheduled for 1/13/2023 2/1/2023 follow-up with Dr Shelby Zapata to discuss if patient is a surgical candidate  Imaging reviewed:   11/1/2022 PET/CT-Hypermetabolic irregular masslike/patchy airspace disease involving the left upper lobe with dominant 2 3 cm spiculated hypermetabolic left upper lobe lung nodule with extension of more patchy hypermetabolic airspace disease superiorly  No evidence for hypermetabolic metastatic disease  9/20/2022 CT chest    Pathology reviewed:   12/19/2022 DEEPALI-Adenocarcinoma compatible with a lung primary  PFT's reviewed:  10/25/2022 FEV1 112%: DLCO 58%    Future imaging:  None at this time  Referrals:  None needed at this time  Procedures:  TBD      Team agreed to plan  NCCN guidelines were readily available for review at this discussion    The final treatment plan will be left to the discretion of the patient and the treating physician       DISCLAIMERS:  TO THE TREATING PHYSICIAN:  This conference is a meeting of clinicians from various specialty areas who evaluate and discuss patients for whom a multidisciplinary treatment approach is being considered  Please note that the above opinion was a consensus of the conference attendees and is intended only to assist in quality care of the discussed patient  The responsibility for follow up on the input given during the conference, along with any final decisions regarding plan of care, is that of the patient and the patient's provider  Accordingly, appointments have only been recommended based on this information and have NOT been scheduled unless otherwise noted  TO THE PATIENT:  This summary is a brief record of major aspects of your cancer treatment  You may choose to share a copy with any of your doctors or nurses  However, this is not a detailed or comprehensive record of your care

## 2023-01-17 ENCOUNTER — TELEPHONE (OUTPATIENT)
Dept: SURGICAL ONCOLOGY | Facility: CLINIC | Age: 78
End: 2023-01-17

## 2023-01-17 DIAGNOSIS — C34.12 MALIGNANT NEOPLASM OF UPPER LOBE OF LEFT LUNG (HCC): Primary | ICD-10-CM

## 2023-01-17 NOTE — TELEPHONE ENCOUNTER
I contacted Ansley Arrington and told her that a referral had been placed for SBRT  They would prefer [facility] or [facility]

## 2023-01-17 NOTE — TELEPHONE ENCOUNTER
Patient called in to report he doesn't want to have surgery with Dr Baxter  He reports he would just like to do chemotherapy and be referred to a doctor for that   Please call his daughter Shruti Singh for all appointments, [phone]

## 2023-01-20 ENCOUNTER — TELEPHONE (OUTPATIENT)
Dept: CARDIAC SURGERY | Facility: CLINIC | Age: 78
End: 2023-01-20

## 2023-01-20 NOTE — TELEPHONE ENCOUNTER
Pt daughter called in to cancel pt appointment with Dr Maria Victoria Schaeffer on 02/01/23  She states pt decided not to do surgery so he will just follow up with Radiation instead

## 2023-01-24 ENCOUNTER — RADIATION ONCOLOGY CONSULT (OUTPATIENT)
Dept: RADIATION ONCOLOGY | Facility: HOSPITAL | Age: 78
End: 2023-01-24

## 2023-01-24 ENCOUNTER — CLINICAL SUPPORT (OUTPATIENT)
Dept: RADIATION ONCOLOGY | Facility: HOSPITAL | Age: 78
End: 2023-01-24
Attending: RADIOLOGY

## 2023-01-24 VITALS
BODY MASS INDEX: 24.95 KG/M2 | WEIGHT: 164.6 LBS | SYSTOLIC BLOOD PRESSURE: 133 MMHG | HEART RATE: 85 BPM | HEIGHT: 68 IN | TEMPERATURE: 97.7 F | OXYGEN SATURATION: 97 % | DIASTOLIC BLOOD PRESSURE: 80 MMHG

## 2023-01-24 DIAGNOSIS — C34.12 MALIGNANT NEOPLASM OF UPPER LOBE OF LEFT LUNG (HCC): Primary | ICD-10-CM

## 2023-01-24 NOTE — PROGRESS NOTES
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  Referred by Dr Ramya Paetl  Additional medical problems include COPD, cardiomyopathy, CHF, HTN, DM2  Also has an automatic implantable cardiac defibrillator  Former cigarette smoker, 2PPD for 45 years, quit in 2020  Presented to PCP with c/o URI symptoms for which a CXR was ordered  CXR revealed Left perihilar opacity is seen which may be secondary to neoplasm, infection, or focal parenchymal scarring  Consider CT chest for further evaluation  9/20/22  CT chest wo contrast  IMPRESSION:   2 cm spiculated cavitary nodule in the left upper lobe with adjacent bronchiolectasis and parenchymal soft tissue thickening  Peribronchial opacity was described in the left upper lobe without specific measurement on the prior outside chest CT report; images unavailable for review  Follow-up with CT PET or tissue sampling is advised  If outside CT study is made available for review and the irregular opacity is found to be stable or smaller, short-term CT surveillance in 3 months may be considered  Otherwise recommendation is as above  10/19/22  Dr Lanny Almonte, pulmonary consult  Will attempt to obtain LVH images  Plan for CT/PET  and PFT    10/25/22  Complete PFT with post bronchodilator  IMPRESSION:  1  Normal spirometry with no obstruction and normal vital capacity  2  No significant bronchodilator response  3  Normal lung volumes  4  Moderately impaired diffusion capacity  5  Normal flow volume loop    11/1/22  PET CT  IMPRESSION:   1  Hypermetabolic irregular masslike/patchy airspace disease involving the left upper lobe with dominant 2 3 cm spiculated hypermetabolic left upper lobe lung nodule with extension of more patchy hypermetabolic airspace disease superiorly    While the appearance is fairly similar to CT dated 2/3/2022 and could reflect a benign inflammatory process, hypermetabolic malignancy is the diagnosis of exclusion and further evaluation with tissue sampling is recommended  No evidence for hypermetabolic metastatic disease  2   Mild patchy hypermetabolic mural thickening of the proximal mid gastric wall for which correlation is recommended to exclude underlying gastric mucosal process  3   Nonspecific mural thickening of the anterior bladder wall which could be related to underdistention with underlying urothelial process not excluded  Consider urologic consultation  4   Mild aneurysmal dilatation of the ascending thoracic aorta measuring 4 1 cm  Annual follow-up is recommended  22  Tissue Exam  A  Lung, left upper lobe nodule, biopsy:     - Adenocarcinoma compatible with a lung primary    22  Thoracic Surgery  Treatment options discussed: resection or SBRT  Recommend cardiac clearance prior to surgery  Will make ultimate decision after CPRT and cardiology f/u    23 THORACIC ONCOLOGY MULTIDISCIPLINARY CASE REVIEW  PHYSICIAN RECOMMENDED PLAN:  Cardiopulmonary exercise stress test scheduled for 2023 follow-up with Dr Drew Simon to discuss if patient is a surgical candidate  23 patient notified thoracic surgery that he does not want surgical resection  Referral to radiation oncology for consideration of SBRT placed    Upcomin23  Cardiac             Oncology History   Malignant neoplasm of upper lobe of left lung ([address] )   2022 Biopsy    A  Lung, left upper lobe nodule, biopsy:     - Adenocarcinoma compatible with a lung primary  2022 Initial Diagnosis    Malignant neoplasm of upper lobe of left lung ([address] )         Review of Systems:  Review of Systems   Constitutional: Positive for activity change, appetite change, fatigue and unexpected weight change  Negative for fever  HENT: Positive for rhinorrhea (clear)  Negative for trouble swallowing      Eyes:        Glasses   Respiratory: Positive for cough (frequent non-productive cough) and shortness of breath (with activity)  Negative for chest tightness and wheezing  Cardiovascular: Positive for palpitations (with stair climbing only)  Has pacemaker   Gastrointestinal: Positive for diarrhea (diet related)  Negative for abdominal pain, constipation, nausea and vomiting  Endocrine: Positive for cold intolerance  Genitourinary: Positive for difficulty urinating (occasional) and frequency  Negative for dysuria  Nocturia 2-3x   Musculoskeletal: Positive for arthralgias (bilateral hands) and back pain  Skin: Negative  H/o skin cancer   Allergic/Immunologic: Negative  Negative for environmental allergies and food allergies  Neurological: Positive for weakness and numbness (occasional left foot)  Hematological: Negative  Psychiatric/Behavioral: Positive for sleep disturbance         Clinical Trial: no      Pain assessment: 0    PFT completed 10/25/22    Prior Radiation: NONE    Teaching SBRT packet given and reviewed    MST completed    Implantable Devices (Port, pacemaker, pain stimulator) Has pacemaker/ICD  Card copied    Hip Replacement: NONE    Health Maintenance   Topic Date Due   • DM Eye Exam  Never done   • Kidney Health Evaluation: Microalbumin/Creatinine Ratio  05/04/2022   • HEMOGLOBIN A1C  08/05/2022   • Influenza Vaccine (1) 09/01/2022   • Fall Risk  11/09/2022   • Depression Screening  05/31/2023   • COVID-19 Vaccine (3 - Booster for Pfeiffer Peter series) 03/07/2023 (Originally 10/26/2021)   • Hepatitis C Screening  06/01/2023 (Originally 1945)   • Medicare Annual Wellness Visit (AWV)  12/06/2023 (Originally 8/21/2020)   • Hepatitis B Vaccine (1 of 3 - Risk 3-dose series) 12/06/2023 (Originally 8/6/2007)   • Hepatitis A Vaccine (1 of 2 - Risk 2-dose series) 12/06/2023 (Originally 8/6/1946)   • BMI: Followup Plan  12/06/2023   • Diabetic Foot Exam  12/06/2023   • Kidney Health Evaluation: GFR  12/19/2023   • BMI: Adult  12/28/2023   • Colorectal Cancer Screening  2024   • Pneumococcal Vaccine: 65+ Years  Completed   • HIB Vaccine  Aged Out   • IPV Vaccine  Aged Out   • Meningococcal ACWY Vaccine  Aged Out   • HPV Vaccine  Aged Out   • Lung Cancer Screening  Discontinued       Past Medical History:   Diagnosis Date   • Angina pectoris ([facility] )     "in the past"   • Basal cell carcinoma     Resolved 2017    • Colitis 2017   • COPD (chronic obstructive pulmonary disease) (HCC)    • Diabetes mellitus ([facility] )     NIDDM   • Hyperlipidemia    • Hypertension    • Malignant melanoma ([facility] ) 2020       Past Surgical History:   Procedure Laterality Date   • CARDIAC SURGERY      cardiac cath   • COLONOSCOPY     • HAND SURGERY Left    • IR BIOPSY LUNG  2022   • KNEE ARTHROSCOPY Right    • NECK SURGERY     • TX EXCISION MALIGNANT LESION F/E/E/N/L >4 0 CM N/A 2016    Procedure: LEFT CHEEK BCC EXCISION; FROZEN SECTION; BILATERAL LOWER EYELIDS SUSPICIOUS LESION EXCISION; LEFT CHEEK EXTRACTION OF COMEDONES X 2;  Surgeon: Emanuel Roman MD;  [facility];  Service: Plastics   • TX EXCISION MALIGNANT LESION TRUNK/ARM/LEG > 4 0 CM Left 2019    Procedure: EXCISION WIDE LESION TRUNK/ABDOMEN/BACK;  Surgeon: Ivy Felix MD;  [facility];  Service: General       Family History   Problem Relation Age of Onset   • Stomach cancer Father    • Colon cancer Sister        Social History     Tobacco Use   • Smoking status: Former     Packs/day: 2 00     Years: 45 00     Pack years: 90 00     Types: Cigarettes     Start date: 0     Quit date: 10/4/2017     Years since quittin 3   • Smokeless tobacco: Never   Substance Use Topics   • Alcohol use: Not Currently     Alcohol/week: 1 0 standard drink     Types: 1 Cans of beer per week   • Drug use: Never          Current Outpatient Medications:   •  aspirin 81 MG tablet, Take 81 mg by mouth daily  , Disp: , Rfl:   •  fluticasone-vilanterol (Breo Ellipta) 100-25 mcg/inh inhaler, USE 1 INHALATION BY MOUTH  DAILY, Disp: 84 each, Rfl: 3  •  metFORMIN (GLUCOPHAGE) 1000 MG tablet, Take 1 tablet (1,000 mg total) by mouth 2 (two) times a day with meals, Disp: 180 tablet, Rfl: 3  •  metoprolol succinate (TOPROL-XL) 25 mg 24 hr tablet, Take 1 tablet (25 mg total) by mouth daily, Disp: 90 tablet, Rfl: 3  •  nitroglycerin (NITROSTAT) 0 4 mg SL tablet, , Disp: , Rfl:   •  sacubitril-valsartan (Entresto) 49-51 MG TABS, Take 1 tablet by mouth 2 (two) times a day, Disp: , Rfl:   •  simvastatin (ZOCOR) 40 mg tablet, Take 1 tablet (40 mg total) by mouth daily at bedtime for 90 days, Disp: 90 tablet, Rfl: 3  •  spironolactone (ALDACTONE) 25 mg tablet, , Disp: , Rfl:     No Known Allergies     There were no vitals filed for this visit

## 2023-01-24 NOTE — PROGRESS NOTES
Consultation - Radiation Oncology      [facility]:[id number] : 1945  Encounter: [id number]  Patient Information: [address]  Chief Complaint   Patient presents with   • Consult     Cancer Staging   Malignant neoplasm of upper lobe of left lung [facility])  Staging form: Lung, AJCC 8th Edition  - Clinical stage from 2023: Stage IA3 (cT1c, cN0, cM0) - Signed by Stephanie Sanders MD on 2023  Histopathologic type: Adenocarcinoma, NOS  Stage prefix: Initial diagnosis  Laterality: Left  Tumor size (mm): 23  Stage used in treatment planning: Yes  National guidelines used in treatment planning: Yes           History of Present Illness   Sharron Fountain is a 78y o  year old male who presents here to discuss SBRT for 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  Referred by Dr Bart Littlejohn  Additional medical problems include COPD, cardiomyopathy, CHF, HTN, DM2  Also has an automatic implantable cardiac defibrillator  Former cigarette smoker, 2PPD for 45 years, quit in       Presented to PCP with c/o URI symptoms for which a CXR was ordered  CXR revealed Left perihilar opacity is seen which may be secondary to neoplasm, infection, or focal parenchymal scarring   Consider CT chest for further evaluation      22 Placement of automatic implantable cardiac defibrillator in left upper chest    22  CT chest wo contrast  IMPRESSION:   2 cm spiculated cavitary nodule in the left upper lobe with adjacent bronchiolectasis and parenchymal soft tissue thickening  Peribronchial opacity was described in the left upper lobe without specific measurement on the prior outside chest CT report; images unavailable for review  Follow-up with CT PET or tissue sampling is advised    If outside CT study is made available for review and the irregular opacity is found to be stable or smaller, short-term CT surveillance in 3 months may be considered   Otherwise recommendation is as above      10/19/22  Dr Agnieszka Curran, pulmonary consult  Will attempt to obtain LVH images  Plan for CT/PET  and PFT     10/25/22  Complete PFT with post bronchodilator  IMPRESSION:  1  Normal spirometry with no obstruction and normal vital capacity  2  No significant bronchodilator response  3  Normal lung volumes  4  Moderately impaired diffusion capacity  5  Normal flow volume loop     11/1/22  PET CT  IMPRESSION:   1   Hypermetabolic irregular masslike/patchy airspace disease involving the left upper lobe with dominant 2 3 cm spiculated hypermetabolic left upper lobe lung nodule with extension of more patchy hypermetabolic airspace disease superiorly   While the appearance is fairly similar to CT dated 2/3/2022 and could reflect a benign inflammatory process, hypermetabolic malignancy is the diagnosis of exclusion and further evaluation with tissue sampling is recommended  No evidence for hypermetabolic metastatic disease    2   Mild patchy hypermetabolic mural thickening of the proximal mid gastric wall for which correlation is recommended to exclude underlying gastric mucosal process    3   Nonspecific mural thickening of the anterior bladder wall which could be related to underdistention with underlying urothelial process not excluded   Consider urologic consultation    4   Mild aneurysmal dilatation of the ascending thoracic aorta measuring 4 1 cm   Annual follow-up is recommended      12/19/22  Tissue Exam  A   Lung, left upper lobe nodule, biopsy:     - Adenocarcinoma compatible with a lung primary     12/28/22  Thoracic Surgery  Treatment options discussed: resection or SBRT  Recommend cardiac clearance prior to surgery    Will make ultimate decision after CPRT and cardiology f/u     1/9/23 THORACIC ONCOLOGY MULTIDISCIPLINARY CASE REVIEW  PHYSICIAN RECOMMENDED PLAN:  Cardiopulmonary exercise stress test scheduled for 1/13/2023 2/1/2023 follow-up with Dr Valeria Tuttle to discuss if patient is a surgical candidate       23 patient notified thoracic surgery that he does not want surgical resection  Referral to radiation oncology for consideration of SBRT placed     Patient seen for consultation today with his wife and daughter  He reports an occasional cough with activity  He denies any shortness of breath nor hemoptysis  He denies any chest pains  He has an ICD that was placed 2022 which he reports has not been used  He is retired from working in construction and with heavy equipment  He denies any previous history of radiation therapy nor chemotherapy  He does have a history of skin cancer that has been treated surgically with a squamous cell carcinoma in situ removed from the left back on May 6, 2019, left cheek basal cell excised February 3, 2016 and 2016, and a basal cell carcinoma from the left shoulder on 2019  He has been smoking tobacco at 2 packs/day for 50 years  Upcomin23  Cardiology     Historical Information   Oncology History   Malignant neoplasm of upper lobe of left lung ([facility] [facility] )   2022 Biopsy    A  Lung, left upper lobe nodule, biopsy:     - Adenocarcinoma compatible with a lung primary  2022 Initial Diagnosis    Malignant neoplasm of upper lobe of left lung ([facility] )     2023 -  Cancer Staged    Staging form: Lung, AJCC 8th Edition  - Clinical stage from 2023: Stage IA3 (cT1c, cN0, cM0) - Signed by Jessie Osorio MD on 2023  Histopathologic type: Adenocarcinoma, NOS  Stage prefix: Initial diagnosis  Laterality: Left  Tumor size (mm): 23  Stage used in treatment planning: Yes  National guidelines used in treatment planning:  Yes             Past Medical History:   Diagnosis Date   • Angina pectoris ([address] )     "in the past"   • Basal cell carcinoma     Resolved 2017    • Colitis 2017   • COPD (chronic obstructive pulmonary disease) (HCC)    • Diabetes mellitus (HCC)     NIDDM   • Hyperlipidemia • Hypertension    • Lung cancer [facility])    • Malignant melanoma ([facility] [address] ) 2020     Past Surgical History:   Procedure Laterality Date   • CARDIAC SURGERY      cardiac cath   • COLONOSCOPY     • HAND SURGERY Left    • IR BIOPSY LUNG  2022   • KNEE ARTHROSCOPY Right    • NECK SURGERY      bone graft and plates   • IL EXCISION MALIGNANT LESION F/E/E/N/L >4 0 CM N/A 2016    Procedure: LEFT CHEEK BCC EXCISION; FROZEN SECTION; BILATERAL LOWER EYELIDS SUSPICIOUS LESION EXCISION; LEFT CHEEK EXTRACTION OF COMEDONES X 2;  Surgeon: Barbara Heimlich, MD;  [facility];  Service: Plastics   • IL EXCISION MALIGNANT LESION TRUNK/ARM/LEG > 4 0 CM Left 2019    Procedure: EXCISION WIDE LESION TRUNK/ABDOMEN/BACK;  Surgeon: Stephanie Valadez MD;  [facility];  Service: General       Family History   Problem Relation Age of Onset   • Stomach cancer Mother    • Colon cancer Sister        Social History   Social History     Substance and Sexual Activity   Alcohol Use Not Currently   • Alcohol/week: 1 0 standard drink   • Types: 1 Cans of beer per week     Social History     Substance and Sexual Activity   Drug Use Never     Social History     Tobacco Use   Smoking Status Former   • Packs/day: 2 00   • Years: 45 00   • Pack years: 90 00   • Types: Cigarettes   • Start date: 0   • Quit date: 10/4/2017   • Years since quittin 3   Smokeless Tobacco Never     Meds/Allergies     Current Outpatient Medications:   •  aspirin 81 MG tablet, Take 81 mg by mouth daily  , Disp: , Rfl:   •  fluticasone-vilanterol (Breo Ellipta) 100-25 mcg/inh inhaler, USE 1 INHALATION BY MOUTH  DAILY, Disp: 84 each, Rfl: 3  •  metFORMIN (GLUCOPHAGE) 1000 MG tablet, Take 1 tablet (1,000 mg total) by mouth 2 (two) times a day with meals, Disp: 180 tablet, Rfl: 3  •  metoprolol succinate (TOPROL-XL) 25 mg 24 hr tablet, Take 1 tablet (25 mg total) by mouth daily, Disp: 90 tablet, Rfl: 3  •  sacubitril-valsartan (Entresto) 49-51 MG TABS, Take 1 tablet by mouth 2 (two) times a day, Disp: , Rfl:   •  simvastatin (ZOCOR) 40 mg tablet, Take 1 tablet (40 mg total) by mouth daily at bedtime for 90 days, Disp: 90 tablet, Rfl: 3  •  spironolactone (ALDACTONE) 25 mg tablet, , Disp: , Rfl:   •  nitroglycerin (NITROSTAT) 0 4 mg SL tablet, , Disp: , Rfl:   No Known Allergies    Review of Systems  Constitutional: Positive for activity change, appetite change, fatigue and unexpected weight change  Negative for fever  HENT: Positive for rhinorrhea (clear)  Negative for trouble swallowing  Eyes:        Glasses   Respiratory: Positive for cough (frequent non-productive cough) and shortness of breath (with activity)  Negative for chest tightness and wheezing  Cardiovascular: Positive for palpitations (with stair climbing only)  Has pacemaker   Gastrointestinal: Positive for diarrhea (diet related)  Negative for abdominal pain, constipation, nausea and vomiting  Endocrine: Positive for cold intolerance  Genitourinary: Positive for difficulty urinating (occasional) and frequency  Negative for dysuria  Nocturia 2-3x   Musculoskeletal: Positive for arthralgias (bilateral hands) and back pain  Skin: Negative  H/o skin cancer   Allergic/Immunologic: Negative  Negative for environmental allergies and food allergies  Neurological: Positive for weakness and numbness (occasional left foot)  Hematological: Negative  Psychiatric/Behavioral: Positive for sleep disturbance       OBJECTIVE:   /80 (BP Location: Right arm, Patient Position: Sitting, Cuff Size: Large)   Pulse 85   Temp 97 7 °F (36 5 °C) (Temporal)   Ht 5' 8" (1 727 m)   Wt 74 7 kg (164 lb 9 6 oz)   SpO2 97%   BMI 25 03 kg/m²   Pain Assessment:  0  Performance Status: ECOG/Zubrod/WHO: 0 - Asymptomatic    Physical Exam  Vitals and nursing note reviewed  Constitutional:       General: He is not in acute distress  Appearance: He is well-developed   He is not diaphoretic  HENT:      Head: Normocephalic and atraumatic  Mouth/Throat:      Pharynx: No oropharyngeal exudate  Eyes:      General: No scleral icterus  Conjunctiva/sclera: Conjunctivae normal       Pupils: Pupils are equal, round, and reactive to light  Neck:      Thyroid: No thyromegaly  Trachea: No tracheal deviation  Cardiovascular:      Rate and Rhythm: Normal rate and regular rhythm  Heart sounds: Normal heart sounds  Pulmonary:      Effort: Pulmonary effort is normal  No respiratory distress  Breath sounds: Normal breath sounds  No stridor  No wheezing, rhonchi or rales  Chest:      Chest wall: No tenderness  Abdominal:      General: Bowel sounds are normal  There is no distension  Palpations: Abdomen is soft  There is no mass  Tenderness: There is no abdominal tenderness  Musculoskeletal:         General: No swelling or tenderness  Normal range of motion  Cervical back: Normal range of motion and neck supple  Lymphadenopathy:      Cervical: No cervical adenopathy  Upper Body:      Right upper body: No supraclavicular adenopathy  Left upper body: No supraclavicular adenopathy  Skin:     General: Skin is warm and dry  Coloration: Skin is not jaundiced or pale  Findings: No erythema or rash  Neurological:      General: No focal deficit present  Mental Status: He is alert and oriented to person, place, and time  Cranial Nerves: No cranial nerve deficit  Coordination: Coordination normal    Psychiatric:         Mood and Affect: Mood normal          Behavior: Behavior normal          Thought Content:  Thought content normal          Judgment: Judgment normal        RESULTS  Lab Results    Chemistry        Component Value Date/Time    K 4 6 12/19/2022 0832     (H) 12/19/2022 0832    CO2 25 12/19/2022 0832    BUN 29 (H) 12/19/2022 0832    CREATININE 1 33 (H) 12/19/2022 0832        Component Value Date/Time CALCIUM 9 7 12/19/2022 0832    ALKPHOS 87 11/15/2021 0821    AST 7 11/15/2021 0821    ALT 14 11/15/2021 0821        Lab Results   Component Value Date    WBC 10 92 (H) 12/19/2022    HGB 16 0 12/19/2022    HCT 49 1 12/19/2022    MCV 89 12/19/2022     12/19/2022     Imaging Studies: See Above    Pathology: See Above    ASSESSMENT  1  Malignant neoplasm of upper lobe of left lung [facility])  Ambulatory Referral to Radiation Oncology    Radiation Simulation Treatment        Cancer Staging   Malignant neoplasm of upper lobe of left lung [facility])  Staging form: Lung, AJCC 8th Edition  - Clinical stage from 1/24/2023: Stage IA3 (cT1c, cN0, cM0) - Signed by Art Wise MD on 1/24/2023  Histopathologic type: Adenocarcinoma, NOS  Stage prefix: Initial diagnosis  Laterality: Left  Tumor size (mm): 23  Stage used in treatment planning: Yes  National guidelines used in treatment planning: Yes        PLAN/DISCUSSION  Orders Placed This Encounter   Procedures   • Radiation Simulation Treatment        Kateryna Sheehan is a 78y o  year old male with a 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  He had a CAT scan of the chest without contrast on September 20, 2022 that revealed a 2 cm spiculated cavitary nodule in the left upper lobe  He had a PET/CT study November 1, 2022 that confirmed a 2 3 cm hypermetabolic spiculated left upper lobe nodule that could represent malignancy  There was no evidence of any mediastinal lymphadenopathy  Left upper lobe lung biopsy December 19, 2022 confirmed adenocarcinoma compatible with lung primary  He was seen by Dr Jovanni Wright from thoracic surgery on December 28, 2022 and offered surgical resection versus radiation therapy using stereotactic body radiotherapy  He was reviewed on January 9, 2023 at thoracic oncology multidisciplinary case review    Recommendations were made for cardiopulmonary exercise stress test that was scheduled for January 13, 2023 and then follow-up with Dr Valeria Tuttle to discuss potential surgery  He notified thoracic surgery on January 17, 2022 he does not wish to pursue surgical resection  He is seen for consultation today to pursue definitive radiation therapy using stereotactic body radiotherapy  We discussed that this can be just as effective as surgery and avoid the risks of surgery  We discussed the procedure of SBRT including the acute side effects and the potential chronic complications with him  We reviewed his imaging studies including CAT scan of the chest and PET/CT with the patient, his wife and daughter  His left upper lobe lung primary does appear to be below the region of his ICD located in the left upper chest   He should be able to receive the stereotactic body radiotherapy without any dose to his ICD  He does consent to proceed with the treatment  He will be scheduled for a 4D CT lung simulation in [address] [place] [address] followed by treatments in the [address] [place] [address]  Sekou Lopez MD  1/24/2023,11:57 AM      Portions of the record may have been created with voice recognition software  Occasional wrong word or "sound a like" substitutions may have occurred due to the inherent limitations of voice recognition software  Read the chart carefully and recognize, using context, where substitutions have occurred

## 2023-01-30 ENCOUNTER — APPOINTMENT (OUTPATIENT)
Dept: RADIATION ONCOLOGY | Facility: HOSPITAL | Age: 78
End: 2023-01-30
Attending: RADIOLOGY

## 2023-01-31 ENCOUNTER — APPOINTMENT (OUTPATIENT)
Dept: RADIATION ONCOLOGY | Facility: HOSPITAL | Age: 78
End: 2023-01-31

## 2023-02-17 ENCOUNTER — APPOINTMENT (OUTPATIENT)
Dept: RADIATION ONCOLOGY | Facility: HOSPITAL | Age: 78
End: 2023-02-17

## 2023-03-01 ENCOUNTER — APPOINTMENT (OUTPATIENT)
Dept: RADIATION ONCOLOGY | Facility: HOSPITAL | Age: 78
End: 2023-03-01

## 2023-03-03 ENCOUNTER — APPOINTMENT (OUTPATIENT)
Dept: RADIATION ONCOLOGY | Facility: HOSPITAL | Age: 78
End: 2023-03-03
Attending: RADIOLOGY

## 2023-03-13 ENCOUNTER — APPOINTMENT (OUTPATIENT)
Dept: LAB | Facility: CLINIC | Age: 78
End: 2023-03-13

## 2023-03-13 LAB
ALBUMIN SERPL BCP-MCNC: 4.2 G/DL (ref 3.5–5)
ALP SERPL-CCNC: 72 U/L (ref 46–116)
ALT SERPL W P-5'-P-CCNC: 25 U/L (ref 12–78)
ANION GAP SERPL CALCULATED.3IONS-SCNC: 3 MMOL/L (ref 4–13)
AST SERPL W P-5'-P-CCNC: 17 U/L (ref 5–45)
BASOPHILS # BLD AUTO: 0.05 THOUSANDS/ÂΜL (ref 0–0.1)
BASOPHILS NFR BLD AUTO: 1 % (ref 0–1)
BILIRUB SERPL-MCNC: 0.72 MG/DL (ref 0.2–1)
BUN SERPL-MCNC: 27 MG/DL (ref 5–25)
CALCIUM SERPL-MCNC: 9.6 MG/DL (ref 8.3–10.1)
CHLORIDE SERPL-SCNC: 107 MMOL/L (ref 96–108)
CHOLEST SERPL-MCNC: 137 MG/DL
CO2 SERPL-SCNC: 27 MMOL/L (ref 21–32)
CREAT SERPL-MCNC: 1.42 MG/DL (ref 0.6–1.3)
CREAT UR-MCNC: 241 MG/DL
EOSINOPHIL # BLD AUTO: 0.1 THOUSAND/ÂΜL (ref 0–0.61)
EOSINOPHIL NFR BLD AUTO: 1 % (ref 0–6)
ERYTHROCYTE [DISTWIDTH] IN BLOOD BY AUTOMATED COUNT: 13.7 % (ref 11.6–15.1)
EST. AVERAGE GLUCOSE BLD GHB EST-MCNC: 131 MG/DL
GFR SERPL CREATININE-BSD FRML MDRD: 47 ML/MIN/1.73SQ M
GLUCOSE P FAST SERPL-MCNC: 118 MG/DL (ref 65–99)
HBA1C MFR BLD: 6.2 %
HCT VFR BLD AUTO: 46.4 % (ref 36.5–49.3)
HDLC SERPL-MCNC: 38 MG/DL
HGB BLD-MCNC: 14.9 G/DL (ref 12–17)
IMM GRANULOCYTES # BLD AUTO: 0.03 THOUSAND/UL (ref 0–0.2)
IMM GRANULOCYTES NFR BLD AUTO: 0 % (ref 0–2)
LDLC SERPL CALC-MCNC: 77 MG/DL (ref 0–100)
LYMPHOCYTES # BLD AUTO: 2.53 THOUSANDS/ÂΜL (ref 0.6–4.47)
LYMPHOCYTES NFR BLD AUTO: 30 % (ref 14–44)
MCH RBC QN AUTO: 29.3 PG (ref 26.8–34.3)
MCHC RBC AUTO-ENTMCNC: 32.1 G/DL (ref 31.4–37.4)
MCV RBC AUTO: 91 FL (ref 82–98)
MICROALBUMIN UR-MCNC: 120 MG/L (ref 0–20)
MICROALBUMIN/CREAT 24H UR: 50 MG/G CREATININE (ref 0–30)
MONOCYTES # BLD AUTO: 0.69 THOUSAND/ÂΜL (ref 0.17–1.22)
MONOCYTES NFR BLD AUTO: 8 % (ref 4–12)
NEUTROPHILS # BLD AUTO: 5.19 THOUSANDS/ÂΜL (ref 1.85–7.62)
NEUTS SEG NFR BLD AUTO: 60 % (ref 43–75)
NONHDLC SERPL-MCNC: 99 MG/DL
NRBC BLD AUTO-RTO: 0 /100 WBCS
PLATELET # BLD AUTO: 313 THOUSANDS/UL (ref 149–390)
PMV BLD AUTO: 9.4 FL (ref 8.9–12.7)
POTASSIUM SERPL-SCNC: 4.7 MMOL/L (ref 3.5–5.3)
PROT SERPL-MCNC: 7 G/DL (ref 6.4–8.4)
RBC # BLD AUTO: 5.08 MILLION/UL (ref 3.88–5.62)
SODIUM SERPL-SCNC: 137 MMOL/L (ref 135–147)
TRIGL SERPL-MCNC: 108 MG/DL
TSH SERPL DL<=0.05 MIU/L-ACNC: 3.51 UIU/ML (ref 0.45–4.5)
WBC # BLD AUTO: 8.59 THOUSAND/UL (ref 4.31–10.16)

## 2023-04-13 ENCOUNTER — TELEPHONE (OUTPATIENT)
Dept: RADIATION ONCOLOGY | Facility: HOSPITAL | Age: 78
End: 2023-04-13

## 2023-04-14 ENCOUNTER — TELEMEDICINE (OUTPATIENT)
Dept: RADIATION ONCOLOGY | Facility: HOSPITAL | Age: 78
End: 2023-04-14

## 2023-04-14 DIAGNOSIS — C34.12 MALIGNANT NEOPLASM OF UPPER LOBE OF LEFT LUNG (HCC): Primary | ICD-10-CM

## 2023-04-14 PROCEDURE — 99024 POSTOP FOLLOW-UP VISIT: CPT | Performed by: RADIOLOGY

## 2023-04-14 NOTE — PROGRESS NOTES
Virtual Brief Visit    This Visit is being completed by telephone  The Patient is located at Home and in the following state in which I hold an active license [place]    The patient was identified by name and date of birth  Shashi Stacy was informed that this is a telemedicine visit and that the visit is being conducted through Telephone  My office door was closed  No one else was in the room  He acknowledged consent and understanding of privacy and security of the video platform  The patient has agreed to participate and understands they can discontinue the visit at any time  Patient is aware this is a billable service  Assessment/Plan:    Problem List Items Addressed This Visit        Respiratory    Malignant neoplasm of upper lobe of left lung ([facility] [address] ) - Primary    Relevant Orders    NM PET CT skull base to mid thigh     Shashi Stacy is a 78y o  year old male with a 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  He had a CAT scan of the chest without contrast on September 20, 2022 that revealed a 2 cm spiculated cavitary nodule in the left upper lobe  He had a PET/CT study November 1, 2022 that confirmed a 2 3 cm hypermetabolic spiculated left upper lobe nodule that could represent malignancy  There was no evidence of any mediastinal lymphadenopathy  Left upper lobe lung biopsy December 19, 2022 confirmed adenocarcinoma compatible with lung primary  He was seen by Dr Effie Esposito from thoracic surgery on December 28, 2022 and offered surgical resection versus radiation therapy using stereotactic body radiotherapy  He was reviewed on January 9, 2023 at thoracic oncology multidisciplinary case review  Recommendations were made for cardiopulmonary exercise stress test that was scheduled for January 13, 2023 and then follow-up with Dr Effie Esposito to discuss potential surgery  He notified thoracic surgery on January 17, 2022 he does not wish to pursue surgical resection   He was seen for consultation to pursue definitive radiation therapy using stereotactic body radiotherapy  We discussed that this can be just as effective as surgery and avoid the risks of surgery  We reviewed his imaging studies including CAT scan of the chest and PET/CT with the patient, his wife and daughter  His left upper lobe lung primary was below the region of his ICD located in the left upper chest   He  received stereotactic body radiotherapy without any dose to his ICD  He completed treatments in the [address] [place] [address] on March 3, 2023  He is seen for follow-up today  He reports he has stable shortness of breath on exertion  He also has a stable mild cough  He has no sputum production  He denies any hemoptysis  He has a good appetite  He does not smoke any tobacco   We discussed recommendation for repeat PET/CT 3 months after the completion of treatment  This would be on or after [place] 3, 2023  This will be scheduled and he will return for follow-up examination thereafter  He also has follow-up with Dr Nicol Ware on June 6, 2023  Recent Visits  No visits were found meeting these conditions  Showing recent visits within past 7 days and meeting all other requirements  Today's Visits  Date Type Provider Dept   04/14/23 Telemedicine Chad Khan MD [facility]   Showing today's visits and meeting all other requirements  Future Appointments  No visits were found meeting these conditions  Showing future appointments within next 150 days and meeting all other requirements     Oncology History Overview Note   78year old male returns for one month telemedicine follow-up visit s/p completion of SBRT left upper lung lobe on 3/3/23 for Stage IA3 adenocarcinoma of DEEPALI lung  Malignant neoplasm of upper lobe of left lung ([facility] [address] )   12/19/2022 Biopsy    A  Lung, left upper lobe nodule, biopsy:     - Adenocarcinoma compatible with a lung primary       12/28/2022 Initial Diagnosis Malignant neoplasm of upper lobe of left lung ([address] )     1/24/2023 -  Cancer Staged    Staging form: Lung, AJCC 8th Edition  - Clinical stage from 1/24/2023: Stage IA3 (cT1c, cN0, cM0) - Signed by Breana Lee MD on 1/24/2023  Histopathologic type:  Adenocarcinoma, NOS  Stage prefix: Initial diagnosis  Laterality: Left  Tumor size (mm): 23  Stage used in treatment planning: Yes  National guidelines used in treatment planning: Yes       2/21/2023 - 3/3/2023 Radiation    Course: C1 SBRT    Plan ID Energy Fractions Dose per Fraction (cGy) Dose Correction (cGy) Total Dose Delivered (cGy) Elapsed Days   SBRT DEEPALI 6X 5 / 5 1,000 0 5,000 10      Treatment dates:  C1 SBRT: 2/21/2023 - 3/3/2023             Visit Time  Total Visit Duration: 10 min

## 2023-05-27 LAB
LEFT EYE DIABETIC RETINOPATHY: NORMAL
RIGHT EYE DIABETIC RETINOPATHY: NORMAL

## 2023-05-30 DIAGNOSIS — J44.9 CHRONIC OBSTRUCTIVE PULMONARY DISEASE, UNSPECIFIED COPD TYPE (HCC): ICD-10-CM

## 2023-05-30 RX ORDER — FLUTICASONE FUROATE AND VILANTEROL 100; 25 UG/1; UG/1
POWDER RESPIRATORY (INHALATION)
Qty: 84 EACH | Refills: 3 | Status: SHIPPED | OUTPATIENT
Start: 2023-05-30

## 2023-06-01 ENCOUNTER — RA CDI HCC (OUTPATIENT)
Dept: OTHER | Facility: HOSPITAL | Age: 78
End: 2023-06-01

## 2023-06-01 NOTE — PROGRESS NOTES
[facility]  coding opportunities          Chart Reviewed number of suggestions sent to Provider: 3   E11 22, N18 31  I13 0  I42 9    Patients Insurance     Medicare Insurance: [employer] Advantage

## 2023-06-05 ENCOUNTER — HOSPITAL ENCOUNTER (OUTPATIENT)
Dept: RADIOLOGY | Age: 78
Discharge: HOME | End: 2023-06-05
Payer: COMMERCIAL

## 2023-06-05 DIAGNOSIS — C34.12 MALIGNANT NEOPLASM OF UPPER LOBE OF LEFT LUNG (HCC): ICD-10-CM

## 2023-06-05 LAB — GLUCOSE SERPL-MCNC: 118 MG/DL (ref 65–140)

## 2023-06-05 PROCEDURE — 82948 REAGENT STRIP/BLOOD GLUCOSE: CPT

## 2023-06-05 PROCEDURE — G1004 CDSM NDSC: HCPCS

## 2023-06-05 PROCEDURE — A9552 F18 FDG: HCPCS

## 2023-06-05 PROCEDURE — 78815 PET IMAGE W/CT SKULL-THIGH: CPT

## 2023-06-06 ENCOUNTER — OFFICE VISIT (OUTPATIENT)
Dept: FAMILY MEDICINE CLINIC | Facility: CLINIC | Age: 78
End: 2023-06-06
Payer: COMMERCIAL

## 2023-06-06 VITALS
OXYGEN SATURATION: 98 % | TEMPERATURE: 98.1 F | DIASTOLIC BLOOD PRESSURE: 78 MMHG | BODY MASS INDEX: 26.22 KG/M2 | HEART RATE: 88 BPM | HEIGHT: 68 IN | SYSTOLIC BLOOD PRESSURE: 120 MMHG | WEIGHT: 173 LBS

## 2023-06-06 DIAGNOSIS — E11.29 TYPE 2 DIABETES MELLITUS WITH MICROALBUMINURIA, WITHOUT LONG-TERM CURRENT USE OF INSULIN (HCC): Primary | ICD-10-CM

## 2023-06-06 DIAGNOSIS — R80.9 TYPE 2 DIABETES MELLITUS WITH MICROALBUMINURIA, WITHOUT LONG-TERM CURRENT USE OF INSULIN (HCC): Primary | ICD-10-CM

## 2023-06-06 DIAGNOSIS — K55.1 SUPERIOR MESENTERIC ARTERY STENOSIS (HCC): ICD-10-CM

## 2023-06-06 DIAGNOSIS — I50.22 CHF (CONGESTIVE HEART FAILURE), NYHA CLASS I, CHRONIC, SYSTOLIC (HCC): ICD-10-CM

## 2023-06-06 DIAGNOSIS — I10 PRIMARY HYPERTENSION: ICD-10-CM

## 2023-06-06 DIAGNOSIS — Z00.00 MEDICARE ANNUAL WELLNESS VISIT, SUBSEQUENT: ICD-10-CM

## 2023-06-06 DIAGNOSIS — J44.9 CHRONIC OBSTRUCTIVE PULMONARY DISEASE, UNSPECIFIED COPD TYPE (HCC): ICD-10-CM

## 2023-06-06 DIAGNOSIS — Z95.810 AUTOMATIC IMPLANTABLE CARDIAC DEFIBRILLATOR IN SITU: ICD-10-CM

## 2023-06-06 DIAGNOSIS — I25.10 CORONARY ARTERY DISEASE INVOLVING NATIVE CORONARY ARTERY OF NATIVE HEART WITHOUT ANGINA PECTORIS: ICD-10-CM

## 2023-06-06 DIAGNOSIS — C34.12 MALIGNANT NEOPLASM OF UPPER LOBE OF LEFT LUNG (HCC): ICD-10-CM

## 2023-06-06 DIAGNOSIS — I77.1 CELIAC ARTERY STENOSIS (HCC): ICD-10-CM

## 2023-06-06 DIAGNOSIS — I47.1 PAROXYSMAL SUPRAVENTRICULAR TACHYCARDIA (HCC): ICD-10-CM

## 2023-06-06 DIAGNOSIS — N18.31 STAGE 3A CHRONIC KIDNEY DISEASE (HCC): ICD-10-CM

## 2023-06-06 DIAGNOSIS — E78.2 MIXED HYPERLIPIDEMIA: ICD-10-CM

## 2023-06-06 DIAGNOSIS — I71.21 ANEURYSM OF ASCENDING AORTA WITHOUT RUPTURE (HCC): ICD-10-CM

## 2023-06-06 PROBLEM — R93.89 ABNORMAL CT OF THE CHEST: Status: RESOLVED | Noted: 2022-10-19 | Resolved: 2023-06-06

## 2023-06-06 PROCEDURE — 99214 OFFICE O/P EST MOD 30 MIN: CPT | Performed by: FAMILY MEDICINE

## 2023-06-06 PROCEDURE — G0439 PPPS, SUBSEQ VISIT: HCPCS | Performed by: FAMILY MEDICINE

## 2023-06-06 NOTE — PATIENT INSTRUCTIONS
Medicare Preventive Visit Patient Instructions  Thank you for completing your Welcome to Medicare Visit or Medicare Annual Wellness Visit today  Your next wellness visit will be due in one year (6/6/2024)  The screening/preventive services that you may require over the next 5-10 years are detailed below  Some tests may not apply to you based off risk factors and/or age  Screening tests ordered at today's visit but not completed yet may show as past due  Also, please note that scanned in results may not display below  Preventive Screenings:  Service Recommendations Previous Testing/Comments   Colorectal Cancer Screening  · Colonoscopy    · Fecal Occult Blood Test (FOBT)/Fecal Immunochemical Test (FIT)  · Fecal DNA/Cologuard Test  · Flexible Sigmoidoscopy Age: 39-70 years old   Colonoscopy: every 10 years (May be performed more frequently if at higher risk)  OR  FOBT/FIT: every 1 year  OR  Cologuard: every 3 years  OR  Sigmoidoscopy: every 5 years  Screening may be recommended earlier than age 39 if at higher risk for colorectal cancer  Also, an individualized decision between you and your healthcare provider will decide whether screening between the ages of 74-80 would be appropriate   Colonoscopy: 12/20/2021  FOBT/FIT: Not on file  Cologuard: Not on file  Sigmoidoscopy: Not on file    Screening Current     Prostate Cancer Screening Individualized decision between patient and health care provider in men between ages of 53-78   Medicare will cover every 12 months beginning on the day after your 50th birthday PSA: No results in last 5 years     Screening Not Indicated     Hepatitis C Screening Once for adults born between 1945 and 1965  More frequently in patients at high risk for Hepatitis C Hep C Antibody: Not on file        Diabetes Screening 1-2 times per year if you're at risk for diabetes or have pre-diabetes Fasting glucose: 118 mg/dL (3/13/2023)  A1C: 6 2 % (3/13/2023)  Screening Not Indicated  History Diabetes   Cholesterol Screening Once every 5 years if you don't have a lipid disorder  May order more often based on risk factors  Lipid panel: 03/13/2023  Screening Not Indicated  History Lipid Disorder      Other Preventive Screenings Covered by Medicare:  1  Abdominal Aortic Aneurysm (AAA) Screening: covered once if your at risk  You're considered to be at risk if you have a family history of AAA or a male between the age of 73-68 who smoking at least 100 cigarettes in your lifetime  2  Lung Cancer Screening: covers low dose CT scan once per year if you meet all of the following conditions: (1) Age 50-69; (2) No signs or symptoms of lung cancer; (3) Current smoker or have quit smoking within the last 15 years; (4) You have a tobacco smoking history of at least 20 pack years (packs per day x number of years you smoked); (5) You get a written order from a healthcare provider  3  Glaucoma Screening: covered annually if you're considered high risk: (1) You have diabetes OR (2) Family history of glaucoma OR (3)  aged 48 and older OR (3)  American aged 72 and older  3  Osteoporosis Screening: covered every 2 years if you meet one of the following conditions: (1) Have a vertebral abnormality; (2) On glucocorticoid therapy for more than 3 months; (3) Have primary hyperparathyroidism; (4) On osteoporosis medications and need to assess response to drug therapy  5  HIV Screening: covered annually if you're between the age of 12-76  Also covered annually if you are younger than 13 and older than 72 with risk factors for HIV infection  For pregnant patients, it is covered up to 3 times per pregnancy      Immunizations:  Immunization Recommendations   Influenza Vaccine Annual influenza vaccination during flu season is recommended for all persons aged >= 6 months who do not have contraindications   Pneumococcal Vaccine   * Pneumococcal conjugate vaccine = PCV13 (Prevnar 13), PCV15 (Vaxneuvance), PCV20 (Prevnar 20)  * Pneumococcal polysaccharide vaccine = PPSV23 (Pneumovax) Adults 2364 years old: 1-3 doses may be recommended based on certain risk factors  Adults 72 years old: 1-2 doses may be recommended based off what pneumonia vaccine you previously received   Hepatitis B Vaccine 3 dose series if at intermediate or high risk (ex: diabetes, end stage renal disease, liver disease)   Tetanus (Td) Vaccine - COST NOT COVERED BY MEDICARE PART B Following completion of primary series, a booster dose should be given every 10 years to maintain immunity against tetanus  Td may also be given as tetanus wound prophylaxis  Tdap Vaccine - COST NOT COVERED BY MEDICARE PART B Recommended at least once for all adults  For pregnant patients, recommended with each pregnancy  Shingles Vaccine (Shingrix) - COST NOT COVERED BY MEDICARE PART B  2 shot series recommended in those aged 48 and above     Health Maintenance Due:      Topic Date Due   • Hepatitis C Screening  Never done   • Colorectal Cancer Screening  12/19/2024   • Lung Cancer Screening  Discontinued     Immunizations Due:      Topic Date Due   • COVID-19 Vaccine (3 - Pfizer risk series) 09/28/2021   • Influenza Vaccine (Season Ended) 09/01/2023     Advance Directives   What are advance directives? Advance directives are legal documents that state your wishes and plans for medical care  These plans are made ahead of time in case you lose your ability to make decisions for yourself  Advance directives can apply to any medical decision, such as the treatments you want, and if you want to donate organs  What are the types of advance directives? There are many types of advance directives, and each state has rules about how to use them  You may choose a combination of any of the following:  · Living will: This is a written record of the treatment you want  You can also choose which treatments you do not want, which to limit, and which to stop at a certain time   This includes surgery, medicine, IV fluid, and tube feedings  · Durable power of  for healthcare [place] [facility]): This is a written record that states who you want to make healthcare choices for you when you are unable to make them for yourself  This person, called a proxy, is usually a family member or a friend  You may choose more than 1 proxy  · Do not resuscitate (DNR) order:  A DNR order is used in case your heart stops beating or you stop breathing  It is a request not to have certain forms of treatment, such as CPR  A DNR order may be included in other types of advance directives  · Medical directive: This covers the care that you want if you are in a coma, near death, or unable to make decisions for yourself  You can list the treatments you want for each condition  Treatment may include pain medicine, surgery, blood transfusions, dialysis, IV or tube feedings, and a ventilator (breathing machine)  · Values history: This document has questions about your views, beliefs, and how you feel and think about life  This information can help others choose the care that you would choose  Why are advance directives important? An advance directive helps you control your care  Although spoken wishes may be used, it is better to have your wishes written down  Spoken wishes can be misunderstood, or not followed  Treatments may be given even if you do not want them  An advance directive may make it easier for your family to make difficult choices about your care  Weight Management   Why it is important to manage your weight:  Being overweight increases your risk of health conditions such as heart disease, high blood pressure, type 2 diabetes, and certain types of cancer  It can also increase your risk for osteoarthritis, sleep apnea, and other respiratory problems  Aim for a slow, steady weight loss  Even a small amount of weight loss can lower your risk of health problems    How to lose weight safely:  A safe and healthy way to lose weight is to eat fewer calories and get regular exercise  You can lose up about 1 pound a week by decreasing the number of calories you eat by 500 calories each day  Healthy meal plan for weight management:  A healthy meal plan includes a variety of foods, contains fewer calories, and helps you stay healthy  A healthy meal plan includes the following:  · Eat whole-grain foods more often  A healthy meal plan should contain fiber  Fiber is the part of grains, fruits, and vegetables that is not broken down by your body  Whole-grain foods are healthy and provide extra fiber in your diet  Some examples of whole-grain foods are whole-wheat breads and pastas, oatmeal, brown rice, and bulgur  · Eat a variety of vegetables every day  Include dark, leafy greens such as spinach, kale, montserrat greens, and mustard greens  Eat yellow and orange vegetables such as carrots, sweet potatoes, and winter squash  · Eat a variety of fruits every day  Choose fresh or canned fruit (canned in its own juice or light syrup) instead of juice  Fruit juice has very little or no fiber  · Eat low-fat dairy foods  Drink fat-free (skim) milk or 1% milk  Eat fat-free yogurt and low-fat cottage cheese  Try low-fat cheeses such as mozzarella and other reduced-fat cheeses  · Choose meat and other protein foods that are low in fat  Choose beans or other legumes such as split peas or lentils  Choose fish, skinless poultry (chicken or turkey), or lean cuts of red meat (beef or pork)  Before you cook meat or poultry, cut off any visible fat  · Use less fat and oil  Try baking foods instead of frying them  Add less fat, such as margarine, sour cream, regular salad dressing and mayonnaise to foods  Eat fewer high-fat foods  Some examples of high-fat foods include french fries, doughnuts, ice cream, and cakes  · Eat fewer sweets  Limit foods and drinks that are high in sugar   This includes candy, cookies, regular soda, and sweetened drinks  Exercise:  Exercise at least 30 minutes per day on most days of the week  Some examples of exercise include walking, biking, dancing, and swimming  You can also fit in more physical activity by taking the stairs instead of the elevator or parking farther away from stores  Ask your healthcare provider about the best exercise plan for you  © Copyright [employer] 2018 Information is for End User's use only and may not be sold, redistributed or otherwise used for commercial purposes   All illustrations and images included in CareNotes® are the copyrighted property of [employer]  or [address] [place] [address] [employer]

## 2023-06-06 NOTE — PROGRESS NOTES
Assessment and Plan:     Problem List Items Addressed This Visit        Digestive    Superior mesenteric artery stenosis [facility])       Endocrine    Type 2 diabetes mellitus with microalbuminuria, without long-term current use of insulin ([facility]) - Primary    Relevant Orders    Hemoglobin A1C       Respiratory    COPD (chronic obstructive pulmonary disease) ([facility])    Malignant neoplasm of upper lobe of left lung (HCC)       Cardiovascular and Mediastinum    CHF (congestive heart failure), NYHA class I, chronic, systolic ([facility])    Hypertension    Relevant Orders    CBC and differential    Comprehensive metabolic panel    Paroxysmal supraventricular tachycardia ([facility] [address] )    Coronary artery disease involving native coronary artery of native heart without angina pectoris    Celiac artery stenosis ([facility])    Aneurysm of ascending aorta without rupture ([facility])       Genitourinary    Stage 3a chronic kidney disease ([facility] [address] )       Other    Hyperlipidemia    Relevant Orders    Lipid panel    Automatic implantable cardiac defibrillator in situ   Other Visit Diagnoses     Medicare annual wellness visit, subsequent            BMI Counseling: Body mass index is 26 3 kg/m²  The BMI is above normal  Nutrition recommendations include consuming healthier snacks, moderation in carbohydrate intake, reducing intake of saturated and trans fat and reducing intake of cholesterol  Rationale for BMI follow-up plan is due to patient being overweight or obese  Depression Screening and Follow-up Plan: Patient was screened for depression during today's encounter  They screened negative with a PHQ-2 score of 0  Preventive health issues were discussed with patient, and age appropriate screening tests were ordered as noted in patient's After Visit Summary  Personalized health advice and appropriate referrals for health education or preventive services given if needed, as noted in patient's After Visit Summary       History of Present Illness:     Patient "presents for a Medicare Wellness Visit    HPI   Patient Care Team:  Oh Colmenares MD as PCP - MD Bk Montoya MD (Cardiology)  Gisel Carrington MD (General Surgery)  Gladis Najera DO (Pulmonary Disease)  Matt Lund MD as Surgeon (Thoracic Surgery)  Shelton Titus MD (Radiation Oncology)     Review of Systems:     Review of Systems     Problem List:     Patient Active Problem List   Diagnosis   • Cardiomyopathy [facility])   • CHF (congestive heart failure), NYHA class I, chronic, systolic (HCC)   • COPD (chronic obstructive pulmonary disease) ([facility] )   • Hyperlipidemia   • Hypertension   • Old myocardial infarction   • Paroxysmal supraventricular tachycardia ([facility] )   • Hyperuricemia   • Lumbar degenerative disc disease   • Type 2 diabetes mellitus with microalbuminuria, without long-term current use of insulin ([facility] )   • Coronary artery disease involving native coronary artery of native heart without angina pectoris   • Thrombocytosis   • Bilateral renal cysts   • Superior mesenteric artery stenosis (HCC)   • Celiac artery stenosis (HCC)   • Automatic implantable cardiac defibrillator in situ   • IVCD (intraventricular conduction defect)   • Cigarette nicotine dependence in remission   • Aneurysm of ascending aorta without rupture (HCC)   • Malignant neoplasm of upper lobe of left lung (HCC)   • Stage 3a chronic kidney disease ([facility] )      Past Medical and Surgical History:     Past Medical History:   Diagnosis Date   • Angina pectoris ([facility] )     \"in the past\"   • Basal cell carcinoma     Resolved 8/25/2017    • Colitis 11/22/2017   • COPD (chronic obstructive pulmonary disease) (HCC)    • Diabetes mellitus ([facility] [address] )     NIDDM   • Hyperlipidemia    • Hypertension    • Lung cancer ([facility] [address] )    • Malignant melanoma ([facility] ) 02/24/2020     Past Surgical History:   Procedure Laterality Date   • CARDIAC SURGERY  2014    cardiac cath   • COLONOSCOPY     • HAND SURGERY Left    • IR BIOPSY " LUNG  2022   • KNEE ARTHROSCOPY Right    • NECK SURGERY      bone graft and plates   • NH EXCISION MALIGNANT LESION F/E/E/N/L >4 0 CM N/A 2016    Procedure: LEFT CHEEK BCC EXCISION; FROZEN SECTION; BILATERAL LOWER EYELIDS SUSPICIOUS LESION EXCISION; LEFT CHEEK EXTRACTION OF COMEDONES X 2;  Surgeon: Radha Montoya MD;  Location: [facility];  Service: Plastics   • NH EXCISION MALIGNANT LESION TRUNK/ARM/LEG > 4 0 CM Left 2019    Procedure: EXCISION WIDE LESION TRUNK/ABDOMEN/BACK;  Surgeon: Gely Coppola MD;  Location: [facility];  Service: General      Family History:     Family History   Problem Relation Age of Onset   • Stomach cancer Mother    • Colon cancer Sister       Social History:     Social History     Socioeconomic History   • Marital status: /Civil Union     Spouse name: Not on file   • Number of children: Not on file   • Years of education: Not on file   • Highest education level: Not on file   Occupational History   • Not on file   Tobacco Use   • Smoking status: Former     Packs/day: 2 00     Years: 45 00     Total pack years: 90 00     Types: Cigarettes     Start date: 0     Quit date: 10/4/2017     Years since quittin 6   • Smokeless tobacco: Never   Vaping Use   • Vaping Use: Never used   Substance and Sexual Activity   • Alcohol use: Not Currently     Alcohol/week: 1 0 standard drink of alcohol     Types: 1 Cans of beer per week   • Drug use: Never   • Sexual activity: Not on file   Other Topics Concern   • Not on file   Social History Narrative    Former smoker - [facility] per [facility]      Social Determinants of Health     Financial Resource Strain: Low Risk  (2023)    Overall Financial Resource Strain (CARDIA)    • Difficulty of Paying Living Expenses: Not hard at all   Food Insecurity: Not on file   Transportation Needs: No Transportation Needs (2023)    PRAPARE - Transportation    • Lack of Transportation (Medical): No    • Lack of Transportation (Non-Medical):  No Physical Activity: Not on file   Stress: Not on file   Social Connections: Not on file   Intimate Partner Violence: Not on file   Housing Stability: Not on file      Medications and Allergies:     Current Outpatient Medications   Medication Sig Dispense Refill   • aspirin 81 MG tablet Take 81 mg by mouth daily  • Fluticasone Furoate-Vilanterol (Breo Ellipta) 100-25 mcg/actuation inhaler USE 1 INHALATION BY MOUTH  DAILY 84 each 3   • metFORMIN (GLUCOPHAGE) 1000 MG tablet Take 1 tablet (1,000 mg total) by mouth 2 (two) times a day with meals 180 tablet 3   • metoprolol succinate (TOPROL-XL) 25 mg 24 hr tablet Take 1 tablet (25 mg total) by mouth daily 90 tablet 3   • nitroglycerin (NITROSTAT) 0 4 mg SL tablet  (Patient not taking: Reported on 1/24/2023)     • sacubitril-valsartan (Entresto) 49-51 MG TABS Take 1 tablet by mouth 2 (two) times a day     • simvastatin (ZOCOR) 40 mg tablet Take 1 tablet (40 mg total) by mouth daily at bedtime for 90 days 90 tablet 3   • spironolactone (ALDACTONE) 25 mg tablet        No current facility-administered medications for this visit       No Known Allergies   Immunizations:     Immunization History   Administered Date(s) Administered   • COVID-19 PFIZER VACCINE 0 3 ML IM 08/10/2021, 08/31/2021   • INFLUENZA 11/11/2013, 10/24/2014, 10/27/2017, 10/08/2018, 09/24/2019   • Influenza Split High Dose Preservative Free IM 10/08/2018, 09/24/2019   • Influenza, high dose seasonal 0 7 mL 11/09/2021   • Pneumococcal Conjugate 13-Valent 10/27/2017   • Pneumococcal Polysaccharide PPV23 10/27/2018   • TD (adult) Preservative Free 08/21/2019   • Td (adult), adsorbed 08/21/2019      Health Maintenance:         Topic Date Due   • Hepatitis C Screening  06/06/2024 (Originally 1945)   • Colorectal Cancer Screening  12/19/2024   • Lung Cancer Screening  Discontinued         Topic Date Due   • Influenza Vaccine (Season Ended) 09/01/2023      Medicare Screening Tests and Risk Assessments: Hesham Santacruz is here for his Subsequent Wellness visit  Last Medicare Wellness visit information reviewed, patient interviewed and updates made to the record today  Health Risk Assessment:   Patient rates overall health as fair  Patient feels that their physical health rating is same  Patient is satisfied with their life  Eyesight was rated as same  Hearing was rated as same  Patient feels that their emotional and mental health rating is same  Patients states they are often angry  Patient states they are often unusually tired/fatigued  Pain experienced in the last 7 days has been some  Patient's pain rating has been 8/10  Patient states that he has experienced weight loss or gain in last 6 months  Depression Screening:   PHQ-2 Score: 0      Fall Risk Screening: In the past year, patient has experienced: no history of falling in past year      Home Safety:  Patient does not have trouble with stairs inside or outside of their home  Patient has working smoke alarms and has working carbon monoxide detector  Home safety hazards include: none  Nutrition:   Current diet is Low Saturated Fat and No Added Salt  Medications:   Patient is not currently taking any over-the-counter supplements  Patient is able to manage medications  Activities of Daily Living (ADLs)/Instrumental Activities of Daily Living (IADLs):   Walk and transfer into and out of bed and chair?: Yes  Dress and groom yourself?: Yes    Bathe or shower yourself?: Yes    Feed yourself?  Yes  Do your laundry/housekeeping?: Yes  Manage your money, pay your bills and track your expenses?: Yes  Make your own meals?: Yes    Do your own shopping?: Yes    Previous Hospitalizations:   Any hospitalizations or ED visits within the last 12 months?: Yes      Advance Care Planning:   Living will: No    Advanced directive: No      Cognitive Screening:   Provider or family/friend/caregiver concerned regarding cognition?: No    PREVENTIVE SCREENINGS "Cardiovascular Screening:    General: History Lipid Disorder and Screening Current      Diabetes Screening:     General: Screening Not Indicated and History Diabetes      Colorectal Cancer Screening:     General: Screening Current      Prostate Cancer Screening:    General: Screening Not Indicated      Osteoporosis Screening:    General: Screening Not Indicated      Abdominal Aortic Aneurysm (AAA) Screening:    Risk factors include: age between 73-67 yo and tobacco use        General: Screening Current      Lung Cancer Screening:     General: Screening Not Indicated and History Lung Cancer      Hepatitis C Screening:    General: Patient Declines    Screening, Brief Intervention, and Referral to Treatment (SBIRT)    Screening  Typical number of drinks in a day: 0  Typical number of drinks in a week: 0  Interpretation: Low risk drinking behavior  Brief Intervention  Alcohol & drug use screenings were reviewed  No concerns regarding substance use disorder identified  Other Counseling Topics:   Skin self-exam, sunscreen and regular weightbearing exercise and calcium and vitamin D intake  No results found       Physical Exam:     /78 (BP Location: Left arm, Patient Position: Sitting, Cuff Size: Standard)   Pulse 88   Temp 98 1 °F (36 7 °C)   Ht 5' 8\" (1 727 m)   Wt 78 5 kg (173 lb)   SpO2 98%   BMI 26 30 kg/m²     Physical Exam     Margarita Dumas MD  "

## 2023-06-06 NOTE — PROGRESS NOTES
Name: Ceasar Moreno      : 1945      MRN: [id number]  Encounter Provider: Luis Armando Perera MD  Encounter Date: 2023   Encounter department: [facility]     1  Type 2 diabetes mellitus with microalbuminuria, without long-term current use of insulin (HCC)  -     Hemoglobin A1C    2  Primary hypertension  -     CBC and differential  -     Comprehensive metabolic panel    3  Stage 3a chronic kidney disease ([address] )    4  Mixed hyperlipidemia  -     Lipid panel    5  CHF (congestive heart failure), NYHA class I, chronic, systolic ([facility])    6  Coronary artery disease involving native coronary artery of native heart without angina pectoris    7  Paroxysmal supraventricular tachycardia ([facility] )    8  Malignant neoplasm of upper lobe of left lung ([facility] )    9  Superior mesenteric artery stenosis ([facility] )    10  Celiac artery stenosis ([facility] )    11  Chronic obstructive pulmonary disease, unspecified COPD type ([facility] )    12  Aneurysm of ascending aorta without rupture ([facility] [facility] )    13  Medicare annual wellness visit, subsequent    14  Automatic implantable cardiac defibrillator in situ    Continue with current medications  Office visit 6 months with labs  Follow-up with Radiation Oncology/Cardiology  Subjective     Follow up visit  Here with his daughter  Medications reviewed  Labs 2023 reviewed see note  Type 2 DM  on Metformin 1,000 mg daily  2023 A1c  6 3  Urine albumin/creatinine ratio elevated at 50  On ARB  No hypoglycemic events  No DPN  Current with eye exam Hypertension blood pressures have been stable on  Metoprolol ER 25 mg daily and Entreso 49/51 BID  No longer on Aldactone  2023 creatinine 1 42  GFR 47  Electrolytes normal   Hgb 14 9  Hyperlipidemia on Simvastatin 40 mg daily  2021 Lipid profile cholesterol  121  Triglycerides 78  HDL 42 LDL 63  TSH 1 43   2022 admission for VVI-ICD  Known non ischemic cardiomyopathy   2022 admission for pneumonia/syncope  Procedure: NM PET CT skull base to mid thigh    Result Date: 6/5/2023  Narrative: PET/CT SCAN INDICATION: Restaging of lung cancer  Status post SBRT  C34 12: Malignant neoplasm of upper lobe, left bronchus or lung MODIFIER: PS COMPARISON: PET/CT 11/1/2022 CELL TYPE: Adenocarcinoma TECHNIQUE:   8 1 mCi F-18-FDG administered IV  Multiplanar attenuation corrected and non attenuation corrected PET images are available for interpretation, and contiguous, low dose, axial CT sections were obtained from the skull vertex through the femurs  Intravenous contrast material was not utilized  This examination, like all CT scans performed in the [facility], was performed utilizing techniques to minimize radiation dose exposure, including the use of iterative reconstruction and automated exposure control  Fasting serum glucose: 118 mg/dl FINDINGS: VISUALIZED BRAIN: No acute abnormalities are seen  HEAD/NECK: There is a physiologic distribution of FDG  No FDG avid cervical adenopathy is seen  CT images: Unremarkable  CHEST: There is more extensive patchy radiotracer uptake in the left midlung at the site of the previously seen lesion though the activity has decreased, now SUV max of 2 8, previously SUV max 7 5  More prominent focus of consolidation noted here on CT measuring 3 3 x 2 1 cm, previously 3 0 x 1 6 cm measured in a similar fashion  No suspicious foci of uptake in the mediastinal or perihilar regions  CT images: Left-sided ICD/pacemaker  Stable pleural thickening versus mild consolidation in the right upper lobe posteriorly  Mild ectasia of the ascending thoracic aorta at 41 mm, unchanged  ABDOMEN: No FDG avid soft tissue lesions are seen  Stable nonspecific gastric activity probably physiologic  CT images: There are a few small left renal cysts  Colonic diverticulosis  PELVIS: No FDG avid soft tissue lesions are seen   Essentially resolved soft tissue focus of uptake anterior to the left pubic symphysis  CT images: Stable appearance to the urinary bladder with possible chronic diffuse wall thickening  OSSEOUS STRUCTURES: No FDG avid lesions are seen  CT images: Anterior cervical spine fusion plate  Impression: 1  Now mild patchy FDG uptake at the site of the previously seen left upper lobe lesion, decreased from the prior exam  Underlying nodular density noted to be slightly larger, overall given decreased activity still likely related to posttreatment changes  2   No new findings suspicious for hypermetabolic metastasis   Workstation performed: [id number]         Lab Results   Component Value Date    HGBA1C 6 2 (H) 03/13/2023         Lab Results   Component Value Date    HCT 46 4 03/13/2023    HGB 14 9 03/13/2023    MCV 91 03/13/2023     03/13/2023    WBC 8 59 03/13/2023     Lab Results   Component Value Date    AGAP 3 (L) 03/13/2023    ALKPHOS 72 03/13/2023    ALT 25 03/13/2023    AST 17 03/13/2023    BUN 27 (H) 03/13/2023    CALCIUM 9 6 03/13/2023     03/13/2023    CO2 27 03/13/2023    CREATININE 1 42 (H) 03/13/2023    EGFR 47 03/13/2023    GLUC 130 12/19/2022    GLUF 118 (H) 03/13/2023    K 4 7 03/13/2023    SODIUM 137 03/13/2023    TBILI 0 72 03/13/2023    TP 7 0 03/13/2023     Creatinine   Date Value Ref Range Status   03/13/2023 1 42 (H) 0 60 - 1 30 mg/dL Final     Comment:     Standardized to IDMS reference method   12/19/2022 1 33 (H) 0 60 - 1 30 mg/dL Final     Comment:     Standardized to IDMS reference method   11/15/2021 0 79 0 60 - 1 30 mg/dL Final     Comment:     Standardized to IDMS reference method         Lab Results   Component Value Date    CHOLESTEROL 137 03/13/2023    CHOLESTEROL 120 11/15/2021    CHOLESTEROL 116 05/04/2021     Lab Results   Component Value Date    HDL 38 (L) 03/13/2023    HDL 37 (L) 11/15/2021    HDL 40 05/04/2021     Lab Results   Component Value Date    TRIG 108 03/13/2023    TRIG 75 11/15/2021    TRIG 90 05/04/2021     Lab Results   Component Value Date    LDLCALC 77 03/13/2023           Review of Systems   Constitutional: Positive for unexpected weight change (9 lb weight gain from 01/2/2023 )  Negative for appetite change, chills and fever  Appetite improved  19 lb weight gain from 11/2021   HENT: Negative for congestion, ear pain, rhinorrhea, sore throat and trouble swallowing  Eyes: Negative for visual disturbance  Respiratory: Positive for cough (chronic )  Negative for shortness of breath and wheezing  Adeno CA DEEPALI lung  He opted not to have surgery  He completed XRT 03/2023  PET/CT scan 06/2023 reviewed  Ex-smoker  COPD on Breo daily  Exertional dyspnea no changes  No orthopnea or PND   Cardiovascular: Negative for chest pain, palpitations and leg swelling  See HPI  History of nonischemic cardiomyopathy followed by Cardiology  11/2022 •  Left Ventricle: Left ventricle is mildly dilated  There is mild septal  asymmetric hypertrophy  Systolic function is severely decreased with an  ejection fraction of 30-35%  Global hypokinesis  There is grade I (mild)  diastolic dysfunction   Right Ventricle: Right ventricle cavity is normal  Systolic function is low normal  Device wire present in the ventricle Left Atrium: Left atrium cavity is mildly dilated   Aortic Valve: The aortic valve is trileaflet  The leaflets are mildly thickened  There is no regurgitation  Mitral Valve: There is trace regurgitation  Tricuspid Valve: There is trace regurgitation   Pulmonic Valve: The estimated pulmonary artery systolic pressure is 96 8 mmHg  Normal pulmonary artery pressure  07/2021 nuclear stress test consistent with severe nonischemic cardiomyopathy with severely dilated left ventricle  EF 27%  Severe global hypokinesis  No reversible defects to suggest ischemia    Gastrointestinal: Negative for abdominal pain, blood in stool, constipation, diarrhea, nausea and vomiting         OV 11/2021 for weight loss  12/2021 EGD normal Colonoscopy normal except for 1 polyp  12/2021 abdominal u/s normal except for bilateral renal cysts  12/2021 celiac/mesenteric duplex  aorta is patent and ectatic measuring 2 8cm at its greatest diameter  >70% stenosis in the celiac artery  Velocities decrease with inspiration which may be suggestive of median arcuate ligament syndrome  Splenic and hepatic arteries are patent   >70% stenosis in the superior mesenteric artery in a fasting state  The inferior mesenteric artery is normal and patent  Endocrine: Negative for polydipsia and polyuria  Subclinical hypothyroidism not on medication    Lab Results       Component                Value               Date                       [id number]             3 510               03/13/2023                                      Genitourinary: Negative for difficulty urinating  Musculoskeletal: Positive for back pain  Negative for arthralgias and myalgias  Skin: Negative for rash  Status post resection BCC left cheek and left neck  05/2019 s/p resection SCC in situ left lower back  Allergic/Immunologic: Negative for environmental allergies  Neurological: Negative for dizziness and headaches  02/2022 CT scan head No acute hemorrhage, mass or ischemia identified  Areas of supratentorial white matter low attenuation likely chronic small vessel ischemic disease  Ventricular system, basal cisterns and extra-axial spaces: Prominent compatible   generalized parenchymal volume loss  Hematological: Negative for adenopathy  Does not bruise/bleed easily  02/2022 CBC WBC 10,500  Hgb 12  3  MCV 83  Platelets 215,400         Lab Results       Component                Value               Date                       HCT                      46 4                03/13/2023                 HGB                      14 9                03/13/2023                 MCV                      91                  03/13/2023 "   PLT                      313                 03/13/2023                 WBC                      8 59                03/13/2023                           Platelets       Date                     Value               Ref Range           Status                05/04/2021               406 (H)             149 - 390 Thou*     Final                 01/21/2021               307                 149 - 390 Thou*     Final                 02/10/2020               299                 149 - 390 Thou*     Final                 Psychiatric/Behavioral: Negative for dysphoric mood and sleep disturbance         Past Medical History:   Diagnosis Date   • Angina pectoris ([facility] )     \"in the past\"   • Basal cell carcinoma     Resolved 8/25/2017    • Colitis 11/22/2017   • COPD (chronic obstructive pulmonary disease) (HCC)    • Diabetes mellitus (HCC)     NIDDM   • Hyperlipidemia    • Hypertension    • Lung cancer ([facility] [address] )    • Malignant melanoma ([facility] [address] ) 02/24/2020     Past Surgical History:   Procedure Laterality Date   • CARDIAC SURGERY  2014    cardiac cath   • COLONOSCOPY     • HAND SURGERY Left    • IR BIOPSY LUNG  12/19/2022   • KNEE ARTHROSCOPY Right    • NECK SURGERY      bone graft and plates   • IA EXCISION MALIGNANT LESION F/E/E/N/L >4 0 CM N/A 04/29/2016    Procedure: LEFT CHEEK BCC EXCISION; FROZEN SECTION; BILATERAL LOWER EYELIDS SUSPICIOUS LESION EXCISION; LEFT CHEEK EXTRACTION OF COMEDONES X 2;  Surgeon: Dellis Dandy, MD;  Location: [facility];  Service: Plastics   • IA EXCISION MALIGNANT LESION TRUNK/ARM/LEG > 4 0 CM Left 05/30/2019    Procedure: EXCISION WIDE LESION TRUNK/ABDOMEN/BACK;  Surgeon: Ronit Saenz MD;  Location: [facility];  Service: General     Family History   Problem Relation Age of Onset   • Stomach cancer Mother    • Colon cancer Sister      Social History     Socioeconomic History   • Marital status: /Civil Union     Spouse name: Not on file   • Number of children: Not on file   • Years of education: Not " on file   • Highest education level: Not on file   Occupational History   • Not on file   Tobacco Use   • Smoking status: Former     Packs/day: 2 00     Years: 45 00     Total pack years: 90 00     Types: Cigarettes     Start date: 0     Quit date: 10/4/2017     Years since quittin 6   • Smokeless tobacco: Never   Vaping Use   • Vaping Use: Never used   Substance and Sexual Activity   • Alcohol use: Not Currently     Alcohol/week: 1 0 standard drink of alcohol     Types: 1 Cans of beer per week   • Drug use: Never   • Sexual activity: Not on file   Other Topics Concern   • Not on file   Social History Narrative    Former smoker - As per [facility]      Social Determinants of Health     Financial Resource Strain: Not on file   Food Insecurity: Not on file   Transportation Needs: Not on file   Physical Activity: Not on file   Stress: Not on file   Social Connections: Not on file   Intimate Partner Violence: Not on file   Housing Stability: Not on file     Current Outpatient Medications on File Prior to Visit   Medication Sig   • aspirin 81 MG tablet Take 81 mg by mouth daily     • Fluticasone Furoate-Vilanterol (Breo Ellipta) 100-25 mcg/actuation inhaler USE 1 INHALATION BY MOUTH  DAILY   • metFORMIN (GLUCOPHAGE) 1000 MG tablet Take 1 tablet (1,000 mg total) by mouth 2 (two) times a day with meals   • metoprolol succinate (TOPROL-XL) 25 mg 24 hr tablet Take 1 tablet (25 mg total) by mouth daily   • nitroglycerin (NITROSTAT) 0 4 mg SL tablet  (Patient not taking: Reported on 2023)   • sacubitril-valsartan (Entresto) 49-51 MG TABS Take 1 tablet by mouth 2 (two) times a day   • simvastatin (ZOCOR) 40 mg tablet Take 1 tablet (40 mg total) by mouth daily at bedtime for 90 days   • spironolactone (ALDACTONE) 25 mg tablet      No Known Allergies  Immunization History   Administered Date(s) Administered   • COVID-19 PFIZER VACCINE 0 3 ML IM 08/10/2021, 2021   • INFLUENZA 2013, 10/24/2014, 10/27/2017, "10/08/2018, 09/24/2019   • Influenza Split High Dose Preservative Free IM 10/08/2018, 09/24/2019   • Influenza, high dose seasonal 0 7 mL 11/09/2021   • Pneumococcal Conjugate 13-Valent 10/27/2017   • Pneumococcal Polysaccharide PPV23 10/27/2018   • TD (adult) Preservative Free 08/21/2019   • Td (adult), adsorbed 08/21/2019       Objective     /78 (BP Location: Left arm, Patient Position: Sitting, Cuff Size: Standard)   Pulse 88   Temp 98 1 °F (36 7 °C)   Ht 5' 8\" (1 727 m)   Wt 78 5 kg (173 lb)   SpO2 98%   BMI 26 30 kg/m²      BP Readings from Last 3 Encounters:   06/06/23 120/78   01/24/23 133/80   12/28/22 126/79     Wt Readings from Last 3 Encounters:   06/06/23 78 5 kg (173 lb)   01/24/23 74 7 kg (164 lb 9 6 oz)   12/28/22 77 7 kg (171 lb 4 8 oz)       Physical Exam  Vitals and nursing note reviewed  Constitutional:       General: He is not in acute distress  Appearance: He is well-developed  HENT:      Right Ear: Tympanic membrane and ear canal normal       Left Ear: Tympanic membrane normal       Mouth/Throat:      Mouth: No oral lesions  Pharynx: Oropharynx is clear  Eyes:      General: No scleral icterus  Extraocular Movements: Extraocular movements intact  Conjunctiva/sclera: Conjunctivae normal       Pupils: Pupils are equal, round, and reactive to light  Neck:      Thyroid: No thyroid mass or thyromegaly  Vascular: No carotid bruit or JVD  Trachea: No tracheal deviation  Cardiovascular:      Rate and Rhythm: Normal rate and regular rhythm  Pulses:           Carotid pulses are 2+ on the right side and 2+ on the left side  Heart sounds: Normal heart sounds  No murmur heard  No gallop  Pulmonary:      Effort: Pulmonary effort is normal  No respiratory distress  Breath sounds: Normal breath sounds  No wheezing or rales  Abdominal:      General: There is no abdominal bruit  Palpations: There is no hepatomegaly or splenomegaly   " Musculoskeletal:      Right lower leg: No edema  Left lower leg: No edema  Lymphadenopathy:      Cervical: No cervical adenopathy  Upper Body:      Right upper body: No supraclavicular adenopathy  Left upper body: No supraclavicular adenopathy  Skin:     Findings: No rash  Nails: There is no clubbing  Neurological:      General: No focal deficit present  Mental Status: He is alert and oriented to person, place, and time     Psychiatric:         Mood and Affect: Mood normal          Behavior: Behavior normal          Cognition and Memory: Cognition normal        Isabel Martinez MD

## 2023-06-09 ENCOUNTER — TELEPHONE (OUTPATIENT)
Dept: ADMINISTRATIVE | Facility: OTHER | Age: 78
End: 2023-06-09

## 2023-06-09 NOTE — LETTER
Diabetic Eye Exam Form    Date Requested: 23  Patient: Horacio Apodaca  Patient : 1945   Referring Provider: Shamika Pittman MD      DIABETIC Eye Exam Date _______________________________      Type of Exam MUST be documented for Diabetic Eye Exams  Please CHECK ONE  Retinal Exam       Dilated Retinal Exam       OCT       Optomap-Iris Exam      Fundus Photography       Left Eye - Please check Retinopathy or No Retinopathy        Exam did show retinopathy    Exam did not show retinopathy       Right Eye - Please check Retinopathy or No Retinopathy       Exam did show retinopathy    Exam did not show retinopathy       Comments __________________________________________________________    Practice Providing Exam ______________________________________________    Exam Performed By (print name) _______________________________________      Provider Signature ___________________________________________________      These reports are needed for  compliance  Please fax this completed form and a copy of the Diabetic Eye Exam report to our office located at [place] [address] as soon as possible via Fax [phone] micky Waldron: Phone [phone]  We thank you for your assistance in treating our mutual patient

## 2023-06-09 NOTE — TELEPHONE ENCOUNTER
Upon review of the In Basket request and the patient's chart, initial outreach has been made via fax to facility  Please see Contacts section for details       Thank you  Jacoby Ramon MA

## 2023-06-09 NOTE — TELEPHONE ENCOUNTER
----- Message from Clifton Hopkins sent at 6/9/2023  8:33 AM EDT -----  Regarding: eye exam  06/09/23 8:33 AM    Hello, our patient [facility] has had a diabetic eye exam at [address] [place] [address] works on [address]  completed/performed   Please assist in updating the patient chart by contacted them and resulting in chart   Thank you,  Clifton DECKER

## 2023-06-09 NOTE — LETTER
Diabetic Eye Exam Form    Date Requested: 23  Patient: Alexsandra Solano  Patient : 1945   Referring Provider: Saud Maxwell MD      DIABETIC Eye Exam Date _______________________________      Type of Exam MUST be documented for Diabetic Eye Exams  Please CHECK ONE  Retinal Exam       Dilated Retinal Exam       OCT       Optomap-Iris Exam      Fundus Photography       Left Eye - Please check Retinopathy or No Retinopathy        Exam did show retinopathy    Exam did not show retinopathy       Right Eye - Please check Retinopathy or No Retinopathy       Exam did show retinopathy    Exam did not show retinopathy       Comments __________________________________________________________    Practice Providing Exam ______________________________________________    Exam Performed By (print name) _______________________________________      Provider Signature ___________________________________________________      These reports are needed for  compliance  Please fax this completed form and a copy of the Diabetic Eye Exam report to our office located at [place] [address] as soon as possible via Fax [phone] micky Velasco: Phone [phone]  We thank you for your assistance in treating our mutual patient

## 2023-06-13 ENCOUNTER — RADIATION ONCOLOGY FOLLOW-UP (OUTPATIENT)
Dept: RADIATION ONCOLOGY | Facility: HOSPITAL | Age: 78
End: 2023-06-13
Attending: STUDENT IN AN ORGANIZED HEALTH CARE EDUCATION/TRAINING PROGRAM
Payer: COMMERCIAL

## 2023-06-13 ENCOUNTER — CLINICAL SUPPORT (OUTPATIENT)
Dept: RADIATION ONCOLOGY | Facility: HOSPITAL | Age: 78
End: 2023-06-13
Attending: RADIOLOGY
Payer: COMMERCIAL

## 2023-06-13 VITALS
TEMPERATURE: 97.1 F | HEIGHT: 68 IN | BODY MASS INDEX: 26.22 KG/M2 | SYSTOLIC BLOOD PRESSURE: 130 MMHG | OXYGEN SATURATION: 98 % | RESPIRATION RATE: 18 BRPM | HEART RATE: 80 BPM | WEIGHT: 173 LBS | DIASTOLIC BLOOD PRESSURE: 88 MMHG

## 2023-06-13 DIAGNOSIS — C34.12 MALIGNANT NEOPLASM OF UPPER LOBE OF LEFT LUNG (HCC): Primary | ICD-10-CM

## 2023-06-13 PROCEDURE — 99211 OFF/OP EST MAY X REQ PHY/QHP: CPT | Performed by: RADIOLOGY

## 2023-06-13 PROCEDURE — 99212 OFFICE O/P EST SF 10 MIN: CPT | Performed by: STUDENT IN AN ORGANIZED HEALTH CARE EDUCATION/TRAINING PROGRAM

## 2023-06-13 NOTE — PROGRESS NOTES
Follow-up - Radiation Oncology   Cali Contreras 1945 78 y o  male [id number]      History of Present Illness   Cancer Staging   Malignant neoplasm of upper lobe of left lung [facility])  Staging form: Lung, AJCC 8th Edition  - Clinical stage from 2023: Stage IA3 (cT1c, cN0, cM0) - Signed by Fleeta Cranker, MD on 2023  Histopathologic type: Adenocarcinoma, NOS  Stage prefix: Initial diagnosis  Laterality: Left  Tumor size (mm): 23  Stage used in treatment planning: Yes  National guidelines used in treatment planning: Yes      Cali Contreras 1945 is a 78 y o  male returns for follow-up visit s/p completion of SBRT left upper lung lobe on 3/3/23 for Stage IA3 adenocarcinoma of DEEPALI lung  Last seen via telemedicine on 23 PET CT   IMPRESSION:  1  Now mild patchy FDG uptake at the site of the previously seen left upper lobe lesion, decreased from the prior exam  Underlying nodular density noted to be slightly larger, overall given decreased activity still likely related to posttreatment   changes  2   No new findings suspicious for hypermetabolic metastasis      Currently the patient is doing well overall  He does have increased fatigue and increased hoarseness of voice, though otherwise is doing well  He has no new cough, no dysphagia  He has multiple other non cancer related symptoms as detailed per ROS     Upcomin23 [facility]       Historical Information   Oncology History   Malignant neoplasm of upper lobe of left lung ([facility] [address] )   2022 Biopsy    A  Lung, left upper lobe nodule, biopsy:     - Adenocarcinoma compatible with a lung primary  2022 Initial Diagnosis    Malignant neoplasm of upper lobe of left lung ([facility] [address] )     2023 -  Cancer Staged    Staging form: Lung, AJCC 8th Edition  - Clinical stage from 2023: Stage IA3 (cT1c, cN0, cM0) - Signed by Fleeta Cranker, MD on 2023  Histopathologic type:  Adenocarcinoma, "NOS  Stage prefix: Initial diagnosis  Laterality: Left  Tumor size (mm): 23  Stage used in treatment planning: Yes  National guidelines used in treatment planning: Yes       2/21/2023 - 3/3/2023 Radiation    Course: C1 SBRT    Plan ID Energy Fractions Dose per Fraction (cGy) Dose Correction (cGy) Total Dose Delivered (cGy) Elapsed Days   SBRT DEEPALI 6X 5 / 5 1,000 0 5,000 10      Treatment dates:  C1 SBRT: 2/21/2023 - 3/3/2023             Past Medical History:   Diagnosis Date   • Angina pectoris ([facility] [address] )     \"in the past\"   • Basal cell carcinoma     Resolved 8/25/2017    • Colitis 11/22/2017   • COPD (chronic obstructive pulmonary disease) (HCC)    • Diabetes mellitus (HCC)     NIDDM   • Hyperlipidemia    • Hypertension    • Lung cancer ([facility] [address] )    • Malignant melanoma ([facility] [address] ) 02/24/2020     Past Surgical History:   Procedure Laterality Date   • CARDIAC SURGERY  2014    cardiac cath   • COLONOSCOPY     • HAND SURGERY Left    • IR BIOPSY LUNG  12/19/2022   • KNEE ARTHROSCOPY Right    • NECK SURGERY      bone graft and plates   • UT EXCISION MALIGNANT LESION F/E/E/N/L >4 0 CM N/A 04/29/2016    Procedure: LEFT CHEEK BCC EXCISION; FROZEN SECTION; BILATERAL LOWER EYELIDS SUSPICIOUS LESION EXCISION; LEFT CHEEK EXTRACTION OF COMEDONES X 2;  Surgeon: Traci Borges MD;  [facility];  Service: Plastics   • UT EXCISION MALIGNANT LESION TRUNK/ARM/LEG > 4 0 CM Left 05/30/2019    Procedure: EXCISION WIDE LESION TRUNK/ABDOMEN/BACK;  Surgeon: Martha Peterson MD;  [facility]: [facility];  Service: General       Social History   Social History     Substance and Sexual Activity   Alcohol Use Yes   • Alcohol/week: 1 0 standard drink of alcohol   • Types: 1 Cans of beer per week    Comment: 1 beer a week     Social History     Substance and Sexual Activity   Drug Use Never     Social History     Tobacco Use   Smoking Status Former   • Packs/day: 2 00   • Years: 45 00   • Total pack years: 90 00   • Types: Cigarettes   • Start date: 0   • " Quit date: 10/4/2017   • Years since quittin 6   Smokeless Tobacco Never         Meds/Allergies     Current Outpatient Medications:   •  aspirin 81 MG tablet, Take 81 mg by mouth daily  , Disp: , Rfl:   •  Fluticasone Furoate-Vilanterol (Breo Ellipta) 100-25 mcg/actuation inhaler, USE 1 INHALATION BY MOUTH  DAILY, Disp: 84 each, Rfl: 3  •  metFORMIN (GLUCOPHAGE) 1000 MG tablet, Take 1 tablet (1,000 mg total) by mouth 2 (two) times a day with meals, Disp: 180 tablet, Rfl: 3  •  metoprolol succinate (TOPROL-XL) 25 mg 24 hr tablet, Take 1 tablet (25 mg total) by mouth daily, Disp: 90 tablet, Rfl: 3  •  sacubitril-valsartan (Entresto) 49-51 MG TABS, Take 1 tablet by mouth 2 (two) times a day, Disp: , Rfl:   •  simvastatin (ZOCOR) 40 mg tablet, Take 1 tablet (40 mg total) by mouth daily at bedtime for 90 days, Disp: 90 tablet, Rfl: 3  •  nitroglycerin (NITROSTAT) 0 4 mg SL tablet, , Disp: , Rfl:   •  spironolactone (ALDACTONE) 25 mg tablet, , Disp: , Rfl:   No Known Allergies    Review of Systems   Constitutional: Positive for fatigue (increased) and unexpected weight change (gaining some weight)  Negative for appetite change, chills and fever  HENT: Positive for voice change (when talks for a while, since RT)  Eyes: Negative  Wears glasses   Respiratory: Positive for shortness of breath (with exertion)  Negative for cough  Cardiovascular: Positive for chest pain (c/o occasional chest pain lasting about 5 min, has pacemaker )  Negative for leg swelling  Gastrointestinal: Negative for constipation, diarrhea, nausea and vomiting  Endocrine: Positive for heat intolerance (hot flashes)  Genitourinary: Positive for decreased urine volume (slow stream) and frequency (some frequency)  Negative for dysuria, hematuria and urgency  Nocturia x1   Musculoskeletal: Negative  Skin: Negative  Allergic/Immunologic: Negative for environmental allergies     Neurological: Positive for dizziness (with "getting up too fast)  Negative for weakness, light-headedness, numbness and headaches  Hematological: Does not bruise/bleed easily  Psychiatric/Behavioral: Negative for sleep disturbance           OBJECTIVE:   /88 (BP Location: Right arm, Patient Position: Sitting, Cuff Size: Standard)   Pulse 80   Temp (!) 97 1 °F (36 2 °C) (Temporal)   Resp 18   Ht 5' 8\" (1 727 m)   Wt 78 5 kg (173 lb)   SpO2 98%   BMI 26 30 kg/m²   Pain Assessment:  0  ECOG/Zubrod/WHO: 2 - Symptomatic, <50% confined to bed    Physical Exam   Well appearing  NAD  No increased work of breathing  Decreased aeration bilaterally with no crackles or rales  Extremities warm and well perfused  No C/C/E  No rashes appreciated  RESULTS    Lab Results:   Recent Results (from the past 672 hour(s))   Fingerstick Glucose (POCT)    Collection Time: 06/05/23  8:59 AM   Result Value Ref Range    POC Glucose 118 65 - 140 mg/dl       Imaging Studies:NM PET CT skull base to mid thigh    Result Date: 6/5/2023  Narrative: PET/CT SCAN INDICATION: Restaging of lung cancer  Status post SBRT  C34 12: Malignant neoplasm of upper lobe, left bronchus or lung MODIFIER: PS COMPARISON: PET/CT 11/1/2022 CELL TYPE: Adenocarcinoma TECHNIQUE:   8 1 mCi F-18-FDG administered IV  Multiplanar attenuation corrected and non attenuation corrected PET images are available for interpretation, and contiguous, low dose, axial CT sections were obtained from the skull vertex through the femurs  Intravenous contrast material was not utilized  This examination, like all CT scans performed in the [facility], was performed utilizing techniques to minimize radiation dose exposure, including the use of iterative reconstruction and automated exposure control  Fasting serum glucose: 118 mg/dl FINDINGS: VISUALIZED BRAIN: No acute abnormalities are seen  HEAD/NECK: There is a physiologic distribution of FDG  No FDG avid cervical adenopathy is seen   CT " images: Unremarkable  CHEST: There is more extensive patchy radiotracer uptake in the left midlung at the site of the previously seen lesion though the activity has decreased, now SUV max of 2 8, previously SUV max 7 5  More prominent focus of consolidation noted here on CT measuring 3 3 x 2 1 cm, previously 3 0 x 1 6 cm measured in a similar fashion  No suspicious foci of uptake in the mediastinal or perihilar regions  CT images: Left-sided ICD/pacemaker  Stable pleural thickening versus mild consolidation in the right upper lobe posteriorly  Mild ectasia of the ascending thoracic aorta at 41 mm, unchanged  ABDOMEN: No FDG avid soft tissue lesions are seen  Stable nonspecific gastric activity probably physiologic  CT images: There are a few small left renal cysts  Colonic diverticulosis  PELVIS: No FDG avid soft tissue lesions are seen  Essentially resolved soft tissue focus of uptake anterior to the left pubic symphysis  CT images: Stable appearance to the urinary bladder with possible chronic diffuse wall thickening  OSSEOUS STRUCTURES: No FDG avid lesions are seen  CT images: Anterior cervical spine fusion plate  Impression: 1  Now mild patchy FDG uptake at the site of the previously seen left upper lobe lesion, decreased from the prior exam  Underlying nodular density noted to be slightly larger, overall given decreased activity still likely related to posttreatment changes  2   No new findings suspicious for hypermetabolic metastasis  Workstation performed: [id number]           Assessment/Plan:  Orders Placed This Encounter   Procedures   • CT chest wo contrast      We reviewed the patient's post-SBRT PET/CT, which demonstrates significant improvement in the FDG avidity of his DEEPALI treated lesion  I would recommend continued regular follow-up and will obtain a repeat CT Chest in 3 months  He will plan to follow with Dr Maura Miller at that time for further discussion       Martha Gtz "MD  6/13/2023,1:58 PM    Portions of the record may have been created with voice recognition software   Occasional wrong word or \"sound a like\" substitutions may have occurred due to the inherent limitations of voice recognition software   Read the chart carefully and recognize, using context, where substitutions have occurred        "

## 2023-06-13 NOTE — PROGRESS NOTES
Francis Onofre 1945 is a 78 y o  male returns for follow-up visit s/p completion of SBRT left upper lung lobe on 3/3/23 for Stage IA3 adenocarcinoma of DEEPALI lung  Last seen via telemedicine on 23 PET CT   IMPRESSION:  1  Now mild patchy FDG uptake at the site of the previously seen left upper lobe lesion, decreased from the prior exam  Underlying nodular density noted to be slightly larger, overall given decreased activity still likely related to posttreatment   changes  2   No new findings suspicious for hypermetabolic metastasis  Upcomin23 [facility]          Oncology History   Malignant neoplasm of upper lobe of left lung ([facility] [address] )   2022 Biopsy    A  Lung, left upper lobe nodule, biopsy:     - Adenocarcinoma compatible with a lung primary  2022 Initial Diagnosis    Malignant neoplasm of upper lobe of left lung ([facility] [address] )     2023 -  Cancer Staged    Staging form: Lung, AJCC 8th Edition  - Clinical stage from 2023: Stage IA3 (cT1c, cN0, cM0) - Signed by Airam Taveras MD on 2023  Histopathologic type: Adenocarcinoma, NOS  Stage prefix: Initial diagnosis  Laterality: Left  Tumor size (mm): 23  Stage used in treatment planning: Yes  National guidelines used in treatment planning: Yes       2023 - 3/3/2023 Radiation    Course: C1 SBRT    Plan ID Energy Fractions Dose per Fraction (cGy) Dose Correction (cGy) Total Dose Delivered (cGy) Elapsed Days   SBRT DEEPALI 6X 5 / 5 1,000 0 5,000 10      Treatment dates:  C1 SBRT: 2023 - 3/3/2023             Review of Systems:  Review of Systems   Constitutional: Positive for fatigue (increased) and unexpected weight change (gaining some weight)  Negative for appetite change, chills and fever  HENT: Positive for voice change (when talks for a while, since RT)  Eyes: Negative  Wears glasses   Respiratory: Positive for shortness of breath (with exertion)  Negative for cough  Cardiovascular: Positive for chest pain (c/o occasional chest pain lasting about 5 min, has pacemaker )  Negative for leg swelling  Gastrointestinal: Negative for constipation, diarrhea, nausea and vomiting  Endocrine: Positive for heat intolerance (hot flashes)  Genitourinary: Positive for decreased urine volume (slow stream) and frequency (some frequency)  Negative for dysuria, hematuria and urgency  Nocturia x1   Musculoskeletal: Negative  Skin: Negative  Allergic/Immunologic: Negative for environmental allergies  Neurological: Positive for dizziness (with getting up too fast)  Negative for weakness, light-headedness, numbness and headaches  Hematological: Does not bruise/bleed easily  Psychiatric/Behavioral: Negative for sleep disturbance         Clinical Trial: no        Teaching completed    Health Maintenance   Topic Date Due   • DM Eye Exam  Never done   • Influenza Vaccine (Season Ended) 09/01/2023   • HEMOGLOBIN A1C  09/13/2023   • COVID-19 Vaccine (3 - Pfizer risk series) 09/06/2023 (Originally 9/28/2021)   • Hepatitis B Vaccine (1 of 3 - Risk 3-dose series) 12/06/2023 (Originally 8/6/2007)   • Hepatitis A Vaccine (1 of 2 - Risk 2-dose series) 12/06/2023 (Originally 8/6/1964)   • Hepatitis C Screening  06/06/2024 (Originally 1945)   • Diabetic Foot Exam  12/06/2023   • Kidney Health Evaluation: GFR  03/13/2024   • Kidney Health Evaluation: Albumin/Creatinine Ratio  03/13/2024   • Fall Risk  06/06/2024   • Medicare Annual Wellness Visit (AWV)  06/06/2024   • BMI: Followup Plan  06/06/2024   • Depression Screening  06/13/2024   • BMI: Adult  06/13/2024   • Colorectal Cancer Screening  12/19/2024   • Pneumococcal Vaccine: 65+ Years  Completed   • HIB Vaccine  Aged Out   • IPV Vaccine  Aged Out   • Meningococcal ACWY Vaccine  Aged Out   • HPV Vaccine  Aged Out   • Lung Cancer Screening  Discontinued     Patient Active Problem List   Diagnosis   • Cardiomyopathy ([facility] [address] )   • CHF "(congestive heart failure), NYHA class I, chronic, systolic (HCC)   • COPD (chronic obstructive pulmonary disease) (HCC)   • Hyperlipidemia   • Hypertension   • Old myocardial infarction   • Paroxysmal supraventricular tachycardia (HCC)   • Hyperuricemia   • Lumbar degenerative disc disease   • Type 2 diabetes mellitus with microalbuminuria, without long-term current use of insulin (HCC)   • Coronary artery disease involving native coronary artery of native heart without angina pectoris   • Thrombocytosis   • Bilateral renal cysts   • Superior mesenteric artery stenosis (HCC)   • Celiac artery stenosis (HCC)   • Automatic implantable cardiac defibrillator in situ   • IVCD (intraventricular conduction defect)   • Cigarette nicotine dependence in remission   • Aneurysm of ascending aorta without rupture (HCC)   • Malignant neoplasm of upper lobe of left lung (HCC)   • Stage 3a chronic kidney disease (HCC)     Past Medical History:   Diagnosis Date   • Angina pectoris ([facility] [address] )     \"in the past\"   • Basal cell carcinoma     Resolved 8/25/2017    • Colitis 11/22/2017   • COPD (chronic obstructive pulmonary disease) (HCC)    • Diabetes mellitus (HCC)     NIDDM   • Hyperlipidemia    • Hypertension    • Lung cancer ([facility] [address] )    • Malignant melanoma ([facility] [address] ) 02/24/2020     Past Surgical History:   Procedure Laterality Date   • CARDIAC SURGERY  2014    cardiac cath   • COLONOSCOPY     • HAND SURGERY Left    • IR BIOPSY LUNG  12/19/2022   • KNEE ARTHROSCOPY Right    • NECK SURGERY      bone graft and plates   • MI EXCISION MALIGNANT LESION F/E/E/N/L >4 0 CM N/A 04/29/2016    Procedure: LEFT CHEEK BCC EXCISION; FROZEN SECTION; BILATERAL LOWER EYELIDS SUSPICIOUS LESION EXCISION; LEFT CHEEK EXTRACTION OF COMEDONES X 2;  Surgeon: Desire Tirado MD;  [facility];  Service: Plastics   • MI EXCISION MALIGNANT LESION TRUNK/ARM/LEG > 4 0 CM Left 05/30/2019    Procedure: EXCISION WIDE LESION TRUNK/ABDOMEN/BACK;  Surgeon: Lester Courtney MD;  " Location[facility];  Service: General     Family History   Problem Relation Age of Onset   • Stomach cancer Mother    • Colon cancer Sister      Social History     Socioeconomic History   • Marital status: /Civil Union     Spouse name: Not on file   • Number of children: Not on file   • Years of education: Not on file   • Highest education level: Not on file   Occupational History   • Not on file   Tobacco Use   • Smoking status: Former     Packs/day: 2 00     Years: 45 00     Total pack years: 90 00     Types: Cigarettes     Start date: 0     Quit date: 10/4/2017     Years since quittin 6   • Smokeless tobacco: Never   Vaping Use   • Vaping Use: Never used   Substance and Sexual Activity   • Alcohol use: Yes     Alcohol/week: 1 0 standard drink of alcohol     Types: 1 Cans of beer per week     Comment: 1 beer a week   • Drug use: Never   • Sexual activity: Not on file   Other Topics Concern   • Not on file   Social History Narrative    [facility]      Social Determinants of Health     Financial Resource Strain: Low Risk  (2023)    Overall Financial Resource Strain (CARDIA)    • Difficulty of Paying Living Expenses: Not hard at all   Food Insecurity: Not on file   Transportation Needs: No Transportation Needs (2023)    PRAPARE - Transportation    • Lack of Transportation (Medical): No    • Lack of Transportation (Non-Medical): No   Physical Activity: Not on file   Stress: Not on file   Social Connections: Not on file   Intimate Partner Violence: Not on file   Housing Stability: Not on file       Current Outpatient Medications:   •  aspirin 81 MG tablet, Take 81 mg by mouth daily  , Disp: , Rfl:   •  Fluticasone Furoate-Vilanterol (Breo Ellipta) 100-25 mcg/actuation inhaler, USE 1 INHALATION BY MOUTH  DAILY, Disp: 84 each, Rfl: 3  •  metFORMIN (GLUCOPHAGE) 1000 MG tablet, Take 1 tablet (1,000 mg total) by mouth 2 (two) times a day with meals, Disp: 180 tablet, Rfl: 3  • "metoprolol succinate (TOPROL-XL) 25 mg 24 hr tablet, Take 1 tablet (25 mg total) by mouth daily, Disp: 90 tablet, Rfl: 3  •  sacubitril-valsartan (Entresto) 49-51 MG TABS, Take 1 tablet by mouth 2 (two) times a day, Disp: , Rfl:   •  simvastatin (ZOCOR) 40 mg tablet, Take 1 tablet (40 mg total) by mouth daily at bedtime for 90 days, Disp: 90 tablet, Rfl: 3  •  nitroglycerin (NITROSTAT) 0 4 mg SL tablet, , Disp: , Rfl:   •  spironolactone (ALDACTONE) 25 mg tablet, , Disp: , Rfl:   No Known Allergies  Vitals:    06/13/23 1252   BP: 130/88   BP Location: Right arm   Patient Position: Sitting   Cuff Size: Standard   Pulse: 80   Resp: 18   Temp: (!) 97 1 °F (36 2 °C)   TempSrc: Temporal   SpO2: 98%   Weight: 78 5 kg (173 lb)   Height: 5' 8\" (1 727 m)      Pain Score: 0-No pain  "

## 2023-06-20 NOTE — TELEPHONE ENCOUNTER
As a follow-up, a second attempt has been made for outreach via fax to facility  Please see Contacts section for details      Thank you  Ajit Lara MA

## 2023-06-29 NOTE — TELEPHONE ENCOUNTER
As a final attempt, a third outreach has been made via telephone call to facility  Please see Contacts section for details  This encounter will be closed and completed by end of day  Should we receive the requested information because of previous outreach attempts, the requested patient's chart will be updated appropriately       Thank you  Luis Lee MA

## 2023-09-13 ENCOUNTER — HOSPITAL ENCOUNTER (OUTPATIENT)
Dept: RADIOLOGY | Facility: HOSPITAL | Age: 78
Discharge: HOME | End: 2023-09-13
Attending: STUDENT IN AN ORGANIZED HEALTH CARE EDUCATION/TRAINING PROGRAM
Payer: COMMERCIAL

## 2023-09-13 DIAGNOSIS — C34.12 MALIGNANT NEOPLASM OF UPPER LOBE OF LEFT LUNG (HCC): ICD-10-CM

## 2023-09-13 PROCEDURE — G1004 CDSM NDSC: HCPCS

## 2023-09-13 PROCEDURE — 71250 CT THORAX DX C-: CPT

## 2023-09-19 ENCOUNTER — CLINICAL SUPPORT (OUTPATIENT)
Dept: RADIATION ONCOLOGY | Facility: CLINIC | Age: 78
End: 2023-09-19
Attending: RADIOLOGY
Payer: COMMERCIAL

## 2023-09-19 VITALS
WEIGHT: 176.15 LBS | DIASTOLIC BLOOD PRESSURE: 74 MMHG | HEART RATE: 83 BPM | SYSTOLIC BLOOD PRESSURE: 121 MMHG | BODY MASS INDEX: 26.78 KG/M2 | TEMPERATURE: 97.6 F | OXYGEN SATURATION: 96 %

## 2023-09-19 DIAGNOSIS — C34.12 MALIGNANT NEOPLASM OF UPPER LOBE OF LEFT LUNG (HCC): Primary | ICD-10-CM

## 2023-09-19 PROCEDURE — G0463 HOSPITAL OUTPT CLINIC VISIT: HCPCS | Performed by: RADIOLOGY

## 2023-09-19 PROCEDURE — 99211 OFF/OP EST MAY X REQ PHY/QHP: CPT | Performed by: RADIOLOGY

## 2023-09-19 PROCEDURE — 99213 OFFICE O/P EST LOW 20 MIN: CPT | Performed by: RADIOLOGY

## 2023-09-19 NOTE — PROGRESS NOTES
Marisela Flannery 1945 is a 70 y.o. male returns for follow-up visit s/p completion of SBRT left upper lung lobe on 3/3/23 for Stage IA3 adenocarcinoma of DEEPALI lung. Last seen in radiation oncology on 23.      23  CT of chest  IMPRESSION:   1. Consolidative left upper lobe opacity at site of previous mass redemonstrated. Currently, it demonstrates slightly decreased central density/fullness compared to previous PET/CT. This measures about 3.6 x 1.7 cm (). When remeasured in a similar fashion by myself on the prior PET/CT this measured 4.4 x 1.7 cm. There is some bronchiectasis noted in this area as well as peripheral groundglass density. Overall appearance suggests diminishing tumor mass in a background of developing radiation changes. Overlying increased pleural thickening in this area compared to the prior study could reflect reactive posttreatment changes. Continued follow-up recommended to exclude residual/progressive tumor. 2. Stable tiny left lower lobe nodules. 3. Fusiform ectasia ascending thoracic aorta measuring 40 mm. Upcomin23  Cardiology, [facility]      Follow up visit     Oncology History   Malignant neoplasm of upper lobe of left lung ([address])   2022 Biopsy    A. Lung, left upper lobe nodule, biopsy:     - Adenocarcinoma compatible with a lung primary. 2022 Initial Diagnosis    Malignant neoplasm of upper lobe of left lung ([address])     2023 -  Cancer Staged    Staging form: Lung, AJCC 8th Edition  - Clinical stage from 2023: Stage IA3 (cT1c, cN0, cM0) - Signed by Kj Barragan MD on 2023  Histopathologic type:  Adenocarcinoma, NOS  Stage prefix: Initial diagnosis  Laterality: Left  Tumor size (mm): 23  Stage used in treatment planning: Yes  National guidelines used in treatment planning: Yes       2023 - 3/3/2023 Radiation    Course: C1 SBRT    Plan ID Energy Fractions Dose per Fraction (cGy) Dose Correction (cGy) Total Dose Delivered (cGy) Elapsed Days   SBRT DEEPALI 6X 5 / 5 1,000 0 5,000 10      Treatment dates:  C1 SBRT: 2/21/2023 - 3/3/2023             Review of Systems:  Review of Systems   Constitutional: Positive for fatigue. HENT: Negative. Eyes:        New glasses   Respiratory: Positive for cough (frequent  non productive cough) and shortness of breath (with and without activity). Negative for chest tightness and wheezing. Cardiovascular: Negative. Has pacemaker   Gastrointestinal: Negative. Frequent dry heaves when coughing   Endocrine: Negative. Genitourinary: Negative. Musculoskeletal: Positive for back pain (chronic lower back pain). Skin: Negative. Allergic/Immunologic: Negative. Neurological: Positive for weakness, light-headedness (when rising) and headaches. Hematological: Negative. Does not bruise/bleed easily. Psychiatric/Behavioral: Positive for sleep disturbance (feels like he sleeps too much).        Clinical Trial: no      Teaching completed    Health Maintenance   Topic Date Due   • DM Eye Exam  Never done   • COVID-19 Vaccine (3 - Pfizer risk series) 09/28/2021   • Influenza Vaccine (1) 09/01/2023   • HEMOGLOBIN A1C  09/13/2023   • Diabetic Foot Exam  12/06/2023   • Hepatitis B Vaccine (1 of 3 - Risk 3-dose series) 12/06/2023 (Originally 8/6/2007)   • Hepatitis A Vaccine (1 of 2 - Risk 2-dose series) 12/06/2023 (Originally 8/6/1964)   • Hepatitis C Screening  06/06/2024 (Originally 1945)   • Kidney Health Evaluation: GFR  03/13/2024   • Kidney Health Evaluation: Albumin/Creatinine Ratio  03/13/2024   • Fall Risk  06/06/2024   • Medicare Annual Wellness Visit (AWV)  06/06/2024   • BMI: Followup Plan  06/06/2024   • Depression Screening  06/13/2024   • BMI: Adult  06/13/2024   • Pneumococcal Vaccine: 65+ Years  Completed   • HIB Vaccine  Aged Out   • IPV Vaccine  Aged Out   • Meningococcal ACWY Vaccine  Aged Out   • HPV Vaccine  Aged Out   • Lung Cancer Screening Discontinued   • Colorectal Cancer Screening  Discontinued     Patient Active Problem List   Diagnosis   • Cardiomyopathy ([address])   • CHF (congestive heart failure), NYHA class I, chronic, systolic (HCC)   • COPD (chronic obstructive pulmonary disease) ([address])   • Hyperlipidemia   • Hypertension   • Old myocardial infarction   • Paroxysmal supraventricular tachycardia (HCC)   • Hyperuricemia   • Lumbar degenerative disc disease   • Type 2 diabetes mellitus with microalbuminuria, without long-term current use of insulin ([address])   • Coronary artery disease involving native coronary artery of native heart without angina pectoris   • Thrombocytosis   • Bilateral renal cysts   • Superior mesenteric artery stenosis (HCC)   • Celiac artery stenosis (HCC)   • Automatic implantable cardiac defibrillator in situ   • IVCD (intraventricular conduction defect)   • Cigarette nicotine dependence in remission   • Aneurysm of ascending aorta without rupture (HCC)   • Malignant neoplasm of upper lobe of left lung (HCC)   • Stage 3a chronic kidney disease ([address])     Past Medical History:   Diagnosis Date   • Angina pectoris ([address])     "in the past"   • Basal cell carcinoma     Resolved 8/25/2017    • Colitis 11/22/2017   • COPD (chronic obstructive pulmonary disease) (HCC)    • Diabetes mellitus (HCC)     NIDDM   • Hyperlipidemia    • Hypertension    • Lung cancer ([address])    • Malignant melanoma ([address]) 02/24/2020     Past Surgical History:   Procedure Laterality Date   • CARDIAC SURGERY  2014    cardiac cath   • COLONOSCOPY     • HAND SURGERY Left    • IR BIOPSY LUNG  12/19/2022   • KNEE ARTHROSCOPY Right    • NECK SURGERY      bone graft and plates   • OK EXCISION MALIGNANT LESION F/E/E/N/L >4.0 CM N/A 04/29/2016    Procedure: LEFT CHEEK BCC EXCISION; FROZEN SECTION; BILATERAL LOWER EYELIDS SUSPICIOUS LESION EXCISION; LEFT CHEEK EXTRACTION OF COMEDONES X 2;  Surgeon: Emma Duran MD;  [facility];  Service: Plastics   • OK EXCISION MALIGNANT LESION TRUNK/ARM/LEG > 4.0 CM Left 2019    Procedure: EXCISION WIDE LESION TRUNK/ABDOMEN/BACK;  Surgeon: Cynthia Gonzalez MD;  [facility];  Service: General     Family History   Problem Relation Age of Onset   • Stomach cancer Mother    • Colon cancer Sister      Social History     Socioeconomic History   • Marital status: /Civil Union     Spouse name: Not on file   • Number of children: Not on file   • Years of education: Not on file   • Highest education level: Not on file   Occupational History   • Not on file   Tobacco Use   • Smoking status: Former     Packs/day: 2.00     Years: 45.00     Total pack years: 90.00     Types: Cigarettes     Start date: 0     Quit date: 10/4/2017     Years since quittin.9   • Smokeless tobacco: Never   Vaping Use   • Vaping Use: Never used   Substance and Sexual Activity   • Alcohol use: Yes     Alcohol/week: 1.0 standard drink of alcohol     Types: 1 Cans of beer per week     Comment: 1 beer a week   • Drug use: Never   • Sexual activity: Not on file   Other Topics Concern   • Not on file   Social History Narrative    Former smoker - As per [employer]      Social Determinants of Health     Financial Resource Strain: Low Risk  (2023)    Overall Financial Resource Strain (CARDIA)    • Difficulty of Paying Living Expenses: Not hard at all   Food Insecurity: Not on file   Transportation Needs: No Transportation Needs (2023)    PRAPARE - Transportation    • Lack of Transportation (Medical): No    • Lack of Transportation (Non-Medical): No   Physical Activity: Not on file   Stress: Not on file   Social Connections: Not on file   Intimate Partner Violence: Not on file   Housing Stability: Not on file       Current Outpatient Medications:   •  aspirin 81 MG tablet, Take 81 mg by mouth daily. , Disp: , Rfl:   •  Fluticasone Furoate-Vilanterol (Breo Ellipta) 100-25 mcg/actuation inhaler, USE 1 INHALATION BY MOUTH  DAILY, Disp: 84 each, Rfl: 3  • metFORMIN (GLUCOPHAGE) 1000 MG tablet, Take 1 tablet (1,000 mg total) by mouth 2 (two) times a day with meals, Disp: 180 tablet, Rfl: 3  •  metoprolol succinate (TOPROL-XL) 25 mg 24 hr tablet, Take 1 tablet (25 mg total) by mouth daily, Disp: 90 tablet, Rfl: 3  •  nitroglycerin (NITROSTAT) 0.4 mg SL tablet, , Disp: , Rfl:   •  sacubitril-valsartan (Entresto) 49-51 MG TABS, Take 1 tablet by mouth 2 (two) times a day, Disp: , Rfl:   •  simvastatin (ZOCOR) 40 mg tablet, Take 1 tablet (40 mg total) by mouth daily at bedtime for 90 days, Disp: 90 tablet, Rfl: 3  •  spironolactone (ALDACTONE) 25 mg tablet, , Disp: , Rfl:   No Known Allergies  There were no vitals filed for this visit.

## 2023-09-19 NOTE — PROGRESS NOTES
Follow-up - Radiation Oncology   Los Noble 1945 70 y.o. male [id number]      History of Present Illness   Cancer Staging   Malignant neoplasm of upper lobe of left lung [facility])  Staging form: Lung, AJCC 8th Edition  - Clinical stage from 1/24/2023: Stage IA3 (cT1c, cN0, cM0) - Signed by Qasim Richter MD on 1/24/2023  Histopathologic type: Adenocarcinoma, NOS  Stage prefix: Initial diagnosis  Laterality: Left  Tumor size (mm): 23  Stage used in treatment planning: Yes  National guidelines used in treatment planning: Yes      Los Noble is a 70y.o. year old male who returns for follow-up visit s/p completion of SBRT left upper lung lobe on 3/3/23 for Stage IA3 adenocarcinoma of DEEPALI lung. Last seen in radiation oncology on 6/13/23.       Interval History:  9/13/23  CT of chest  IMPRESSION:   1. Consolidative left upper lobe opacity at site of previous mass redemonstrated. Currently, it demonstrates slightly decreased central density/fullness compared to previous PET/CT. This measures about 3.6 x 1.7 cm (2/52). When remeasured in a similar fashion by myself on the prior PET/CT this measured 4.4 x 1.7 cm. There is some bronchiectasis noted in this area as well as peripheral groundglass density. Overall appearance suggests diminishing tumor mass in a background of developing radiation changes. Overlying increased pleural thickening in this area compared to the prior study could reflect reactive posttreatment changes. Continued follow-up recommended to exclude residual/progressive tumor.   2. Stable tiny left lower lobe nodules.   3. Fusiform ectasia ascending thoracic aorta measuring 40 mm.     He is seen for follow-up today. He reports he has stable shortness of breath on exertion. He also has a stable mild cough. He has no sputum production. He denies any hemoptysis. He has a good appetite. He does not smoke any tobacco since quitting 5 years ago.   He has a previous history of skin cancers with melanoma and basal cell carcinomas that have been excised which have not recurred. He denies any new or suspicious skin lesions. Upcomin23  Cardiology, [facility]  23 Dr. Edwige Avila PCP    Historical Information   Oncology History   Malignant neoplasm of upper lobe of left lung ([address])   2022 Biopsy    A. Lung, left upper lobe nodule, biopsy:     - Adenocarcinoma compatible with a lung primary. 2022 Initial Diagnosis    Malignant neoplasm of upper lobe of left lung ([address])     2023 -  Cancer Staged    Staging form: Lung, AJCC 8th Edition  - Clinical stage from 2023: Stage IA3 (cT1c, cN0, cM0) - Signed by Joshua Noe MD on 2023  Histopathologic type:  Adenocarcinoma, NOS  Stage prefix: Initial diagnosis  Laterality: Left  Tumor size (mm): 23  Stage used in treatment planning: Yes  National guidelines used in treatment planning: Yes       2023 - 3/3/2023 Radiation    Course: C1 SBRT    Plan ID Energy Fractions Dose per Fraction (cGy) Dose Correction (cGy) Total Dose Delivered (cGy) Elapsed Days   SBRT DEEPALI 6X 5 / 5 1,000 0 5,000 10      Treatment dates:  C1 SBRT: 2023 - 3/3/2023             Past Medical History:   Diagnosis Date   • Angina pectoris ([address])     "in the past"   • Basal cell carcinoma     Resolved 2017    • Colitis 2017   • COPD (chronic obstructive pulmonary disease) (HCC)    • Diabetes mellitus (HCC)     NIDDM   • Hyperlipidemia    • Hypertension    • Lung cancer ([address])    • Malignant melanoma ([address]) 2020     Past Surgical History:   Procedure Laterality Date   • CARDIAC SURGERY      cardiac cath   • COLONOSCOPY     • HAND SURGERY Left    • IR BIOPSY LUNG  2022   • KNEE ARTHROSCOPY Right    • NECK SURGERY      bone graft and plates   • MN EXCISION MALIGNANT LESION F/E/E/N/L >4.0 CM N/A 2016    Procedure: LEFT CHEEK BCC EXCISION; FROZEN SECTION; BILATERAL LOWER EYELIDS SUSPICIOUS LESION EXCISION; LEFT CHEEK EXTRACTION OF COMEDONES X 2;  Surgeon: Rui Burciaga MD;  [facility];  Service: Plastics   • [place] EXCISION MALIGNANT LESION TRUNK/ARM/LEG > 4.0 CM Left 2019    Procedure: EXCISION WIDE LESION TRUNK/ABDOMEN/BACK;  Surgeon: Flo Maxwell MD;  [facility];  Service: General       Social History   Social History     Substance and Sexual Activity   Alcohol Use Yes   • Alcohol/week: 1.0 standard drink of alcohol   • Types: 1 Cans of beer per week    Comment: 1 beer a week     Social History     Substance and Sexual Activity   Drug Use Never     Social History     Tobacco Use   Smoking Status Former   • Packs/day: 2.00   • Years: 45.00   • Total pack years: 90.00   • Types: Cigarettes   • Start date: 0   • Quit date: 10/4/2017   • Years since quittin.9   Smokeless Tobacco Never     Meds/Allergies     Current Outpatient Medications:   •  aspirin 81 MG tablet, Take 81 mg by mouth daily. , Disp: , Rfl:   •  Fluticasone Furoate-Vilanterol (Breo Ellipta) 100-25 mcg/actuation inhaler, USE 1 INHALATION BY MOUTH  DAILY, Disp: 84 each, Rfl: 3  •  metFORMIN (GLUCOPHAGE) 1000 MG tablet, Take 1 tablet (1,000 mg total) by mouth 2 (two) times a day with meals, Disp: 180 tablet, Rfl: 3  •  metoprolol succinate (TOPROL-XL) 25 mg 24 hr tablet, Take 1 tablet (25 mg total) by mouth daily, Disp: 90 tablet, Rfl: 3  •  sacubitril-valsartan (Entresto) 49-51 MG TABS, Take 1 tablet by mouth 2 (two) times a day, Disp: , Rfl:   •  simvastatin (ZOCOR) 40 mg tablet, Take 1 tablet (40 mg total) by mouth daily at bedtime for 90 days, Disp: 90 tablet, Rfl: 3  •  spironolactone (ALDACTONE) 25 mg tablet, , Disp: , Rfl:   •  nitroglycerin (NITROSTAT) 0.4 mg SL tablet, , Disp: , Rfl:   No Known Allergies    Review of Systems   Constitutional: Positive for fatigue. HENT: Negative. Eyes:        New glasses   Respiratory: Positive for cough (frequent  non productive cough) and shortness of breath (with and without activity). Negative for chest tightness and wheezing. Cardiovascular: Negative. Has pacemaker   Gastrointestinal: Negative. Frequent dry heaves when coughing   Endocrine: Negative. Genitourinary: Negative. Musculoskeletal: Positive for back pain (chronic lower back pain). Skin: Negative. Allergic/Immunologic: Negative. Neurological: Positive for weakness, light-headedness (when rising) and headaches. Hematological: Negative. Does not bruise/bleed easily. Psychiatric/Behavioral: Positive for sleep disturbance (feels like he sleeps too much).       OBJECTIVE:   /74 (BP Location: Left arm, Patient Position: Sitting, Cuff Size: Standard)   Pulse 83   Temp 97.6 °F (36.4 °C) (Temporal)   Wt 79.9 kg (176 lb 2.4 oz)   SpO2 96%   BMI 26.78 kg/m²   Pain Assessment:  0  ECOG/Zubrod/WHO: 1 - Symptomatic but completely ambulatory    Physical Exam   Vitals and nursing note reviewed. Constitutional:       General: He is not in acute distress. Appearance: He is well-developed. He is not diaphoretic. HENT:      Head: Normocephalic and atraumatic. Mouth/Throat:      Pharynx: No oropharyngeal exudate. Eyes:      General: No scleral icterus. Conjunctiva/sclera: Conjunctivae normal.      Pupils: Pupils are equal, round, and reactive to light. Neck:      Thyroid: No thyromegaly. Trachea: No tracheal deviation. Cardiovascular:      Rate and Rhythm: Normal rate and regular rhythm. Heart sounds: Normal heart sounds. Pulmonary:      Effort: Pulmonary effort is normal. No respiratory distress. Breath sounds: Normal breath sounds. No stridor. No wheezing, rhonchi or rales. Chest:      Chest wall: No tenderness. Abdominal:      General: Bowel sounds are normal. There is no distension. Palpations: Abdomen is soft. There is no mass. Tenderness: There is no abdominal tenderness. Musculoskeletal:         General: No swelling or tenderness.  Normal range of motion. Cervical back: Normal range of motion and neck supple. Lymphadenopathy:      Cervical: No cervical adenopathy. Upper Body:      Right upper body: No supraclavicular adenopathy. Left upper body: No supraclavicular adenopathy. Skin:     General: Skin is warm and dry. Coloration: Skin is not jaundiced or pale. Findings: No erythema or rash. Neurological:      General: No focal deficit present. Mental Status: He is alert and oriented to person, place, and time. Cranial Nerves: No cranial nerve deficit. Coordination: Coordination normal.   Psychiatric:         Mood and Affect: Mood normal.         Behavior: Behavior normal.         Thought Content: Thought content normal.         Judgment: Judgment normal.      RESULTS    Lab Results: No results found for this or any previous visit (from the past 672 hour(s)). Imaging Studies:CT chest wo contrast    Result Date: 9/18/2023  Narrative: CT CHEST WITHOUT IV CONTRAST INDICATION:  C34.12: Malignant neoplasm of upper lobe, left bronchus or lung. Status post SBRT COMPARISON: CT chest 9/20/2022, PET/CT 6/5/2023 TECHNIQUE: CT examination of the chest was performed without intravenous contrast. Multiplanar 2D reformatted images were created from the source data. This examination, like all CT scans performed in the [facility], was performed utilizing techniques to minimize radiation dose exposure, including the use of iterative reconstruction and automated exposure control. Radiation dose length product (DLP) for this visit:  321.92 mGy-cm FINDINGS: LUNGS: Consolidative opacity in the left upper lobe at the site of previous left upper lobe mass redemonstrated. Currently, it demonstrates slightly decreased central density/fullness. This measures about 3.6 x 1.7 cm (2/52). When remeasured in a similar  fashion by myself on the prior PET/CT this measured 4.4 x 1.7 cm.  Curvilinear density extends to the pleural margin anteriorly with increased linear pleural thickening overlying this area compared to the prior study. There is some bronchiectasis noted in this area as well as peripheral groundglass density. Overall appearance suggests diminishing tumor mass in a background of developing radiation changes. Some component of atelectasis/scarring could also account for this appearance. -2 mm left lower lobe groundglass nodule (3/93), unchanged. -2 mm left lower lobe nodule (3/103), unchanged. No new nodules. Mild emphysema. Small calcified granulomas in both lungs. AIRWAYS: No significant filling defect. PLEURA: No pleural effusion. Small amount of fat insinuating into the superior right major fissure incidentally noted. HEART/GREAT VESSELS: Heart is unremarkable for patient's age. Trace pericardial fluid again noted. There is fusiform ectasia of the ascending thoracic aorta measuring up to 40 mm. MEDIASTINUM AND DELANO:  Unremarkable. CHEST WALL AND LOWER NECK: Left chest wall AICD device. Minimal bilateral gynecomastia. VISUALIZED STRUCTURES IN THE UPPER ABDOMEN:  Unremarkable. OSSEOUS STRUCTURES:  No acute fracture or destructive osseous lesion. Lower cervical fusion. Degenerative change of the spine. Impression: 1. Consolidative left upper lobe opacity at site of previous mass redemonstrated. Currently, it demonstrates slightly decreased central density/fullness compared to previous PET/CT. This measures about 3.6 x 1.7 cm (2/52). When remeasured in a similar fashion by myself on the prior PET/CT this measured 4.4 x 1.7 cm. There is some bronchiectasis noted in this area as well as peripheral groundglass density. Overall appearance suggests diminishing tumor mass in a background of developing radiation changes. Overlying increased pleural thickening in this area compared to the prior study could reflect reactive posttreatment changes. Continued follow-up recommended to exclude residual/progressive tumor.  2. Stable tiny left lower lobe nodules. 3. Fusiform ectasia ascending thoracic aorta measuring 40 mm. Workstation performed: [id number]     Assessment/Plan:  Orders Placed This Encounter   Procedures   • CT chest wo contrast        Lula Hernandez is a 70y.o. 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 Cindy Zeng had a CAT scan of the chest without contrast on September 20, 2022 that revealed a 2 cm spiculated cavitary nodule in the left upper lobe.  He had a PET/CT study November 1, 2022 that confirmed a 2.3 cm hypermetabolic spiculated left upper lobe nodule that could represent malignancy.  There was no evidence of any mediastinal lymphadenopathy.  Left upper lobe lung biopsy December 19, 2022 confirmed adenocarcinoma compatible with lung primary. Cindy Zeng was seen by Dr. Barber Copeland from thoracic surgery on December 28, 2022 and offered surgical resection versus radiation therapy using stereotactic body radiotherapy. Cindy Zeng was reviewed on January 9, 2023 at thoracic oncology multidisciplinary case review.  Recommendations were made for cardiopulmonary exercise stress test that was scheduled for January 13, 2023 and then follow-up with Dr. Barber Copeland to discuss potential surgery. Cindy Zeng notified thoracic surgery on January 17, 2022 he does not wish to pursue surgical resection. He was seen for consultation to pursue definitive radiation therapy using stereotactic body radiotherapy.  We discussed that this can be just as effective as surgery and avoid the risks of surgery. We reviewed his imaging studies including CAT scan of the chest and PET/CT with the patient, his wife and daughter. His left upper lobe lung primary was below the region of his ICD located in the left upper chest. Cindy Zeng  received stereotactic body radiotherapy without any dose to his ICD. Cindy Zeng completed treatments in the [address] [place] [address] on March 3, 2023.     He is seen for follow-up today.   He is doing well from a respiratory standpoint. He had a repeat CT of the chest without contrast 2023 that reveals some posttreatment changes with decrease in central density indicating further response to treatment which is in a background of radiation changes. There is some stable tiny left lower lobe nodules and no new nodules. He is doing well and remains without any evidence of disease. CT results were discussed with him. He will return in 4 months for follow-up with a repeat chest CT without contrast.  He also has follow-up with Dr. Nico Alatorre on . Kj Barragan MD  2023,10:14 AM    Portions of the record may have been created with voice recognition software.  Occasional wrong word or "sound a like" substitutions may have occurred due to the inherent limitations of voice recognition software.  Read the chart carefully and recognize, using context, where substitutions have occurred.

## 2023-11-03 DIAGNOSIS — E11.29 TYPE 2 DIABETES MELLITUS WITH MICROALBUMINURIA, WITHOUT LONG-TERM CURRENT USE OF INSULIN: ICD-10-CM

## 2023-11-03 DIAGNOSIS — R80.9 TYPE 2 DIABETES MELLITUS WITH MICROALBUMINURIA, WITHOUT LONG-TERM CURRENT USE OF INSULIN: ICD-10-CM

## 2023-11-29 ENCOUNTER — RA CDI HCC (OUTPATIENT)
Dept: OTHER | Facility: HOSPITAL | Age: 78
End: 2023-11-29

## 2023-11-29 NOTE — PROGRESS NOTES
E11.22, I13.0, I42.9  [address] [facility] coding opportunities          Chart Reviewed number of suggestions sent to Provider: 3     Patients Insurance     Medicare Insurance: [id number]

## 2023-12-07 ENCOUNTER — OFFICE VISIT (OUTPATIENT)
Dept: FAMILY MEDICINE CLINIC | Facility: CLINIC | Age: 78
End: 2023-12-07
Payer: COMMERCIAL

## 2023-12-07 ENCOUNTER — TELEPHONE (OUTPATIENT)
Age: 78
End: 2023-12-07

## 2023-12-07 VITALS
WEIGHT: 175 LBS | HEART RATE: 83 BPM | DIASTOLIC BLOOD PRESSURE: 72 MMHG | OXYGEN SATURATION: 97 % | TEMPERATURE: 97.8 F | SYSTOLIC BLOOD PRESSURE: 130 MMHG | RESPIRATION RATE: 16 BRPM | BODY MASS INDEX: 26.52 KG/M2 | HEIGHT: 68 IN

## 2023-12-07 DIAGNOSIS — I25.10 CORONARY ARTERY DISEASE INVOLVING NATIVE CORONARY ARTERY OF NATIVE HEART WITHOUT ANGINA PECTORIS: ICD-10-CM

## 2023-12-07 DIAGNOSIS — I10 PRIMARY HYPERTENSION: ICD-10-CM

## 2023-12-07 DIAGNOSIS — R80.9 TYPE 2 DIABETES MELLITUS WITH MICROALBUMINURIA, WITHOUT LONG-TERM CURRENT USE OF INSULIN: Primary | ICD-10-CM

## 2023-12-07 DIAGNOSIS — I25.5 ISCHEMIC CARDIOMYOPATHY: ICD-10-CM

## 2023-12-07 DIAGNOSIS — C34.12 MALIGNANT NEOPLASM OF UPPER LOBE OF LEFT LUNG (HCC): ICD-10-CM

## 2023-12-07 DIAGNOSIS — E78.2 MIXED HYPERLIPIDEMIA: ICD-10-CM

## 2023-12-07 DIAGNOSIS — I50.22 CHF (CONGESTIVE HEART FAILURE), NYHA CLASS I, CHRONIC, SYSTOLIC (HCC): ICD-10-CM

## 2023-12-07 DIAGNOSIS — Z95.810 AUTOMATIC IMPLANTABLE CARDIAC DEFIBRILLATOR IN SITU: ICD-10-CM

## 2023-12-07 DIAGNOSIS — K55.1 SUPERIOR MESENTERIC ARTERY STENOSIS (HCC): ICD-10-CM

## 2023-12-07 DIAGNOSIS — J44.9 CHRONIC OBSTRUCTIVE PULMONARY DISEASE, UNSPECIFIED COPD TYPE (HCC): ICD-10-CM

## 2023-12-07 DIAGNOSIS — I71.21 ANEURYSM OF ASCENDING AORTA WITHOUT RUPTURE (HCC): ICD-10-CM

## 2023-12-07 DIAGNOSIS — I77.1 CELIAC ARTERY STENOSIS (HCC): ICD-10-CM

## 2023-12-07 DIAGNOSIS — E11.29 TYPE 2 DIABETES MELLITUS WITH MICROALBUMINURIA, WITHOUT LONG-TERM CURRENT USE OF INSULIN: Primary | ICD-10-CM

## 2023-12-07 DIAGNOSIS — N18.31 STAGE 3A CHRONIC KIDNEY DISEASE (HCC): ICD-10-CM

## 2023-12-07 PROBLEM — D75.839 THROMBOCYTOSIS: Status: RESOLVED | Noted: 2021-11-09 | Resolved: 2023-12-07

## 2023-12-07 PROCEDURE — 99214 OFFICE O/P EST MOD 30 MIN: CPT | Performed by: FAMILY MEDICINE

## 2023-12-07 NOTE — TELEPHONE ENCOUNTER
Patient called and states went to [facility] and got flu vaccine the end of October.

## 2023-12-07 NOTE — PROGRESS NOTES
Name: Javier Noguera      : 1945      MRN: [id number]  Encounter Provider: Ian Lino MD  Encounter Date: 2023   Encounter department: [address] [place] [address]     1. Type 2 diabetes mellitus with microalbuminuria, without long-term current use of insulin     2. Primary hypertension    3. Stage 3a chronic kidney disease ([address])    4. Mixed hyperlipidemia    5. Ischemic cardiomyopathy    6. CHF (congestive heart failure), NYHA class I, chronic, systolic (HCC)    7. Coronary artery disease involving native coronary artery of native heart without angina pectoris    8. Superior mesenteric artery stenosis ([address])    9. Celiac artery stenosis ([address])    10. Aneurysm of ascending aorta without rupture ([address])    11. Chronic obstructive pulmonary disease, unspecified COPD type ([address])    12. Malignant neoplasm of upper lobe of left lung ([address])    13. Automatic implantable cardiac defibrillator in situ    Continue with current medications. Repeat labs. Up-to-date with flu vaccine/pneumococcal vaccines. Follow-up with multiple specialists including Cardiology. Office visit 6 months. Subjective     Follow up visit. Here with his daughter. Medications reviewed. Last labs 2023 reviewed see note. Type 2 DM  on Metformin 1,000 mg daily. 2023 A1c  6.2. Urine albumin elevated at 120  On ARB. No hypoglycemic events. No DPN. Current with eye exam Hypertension/CKD stage 3 blood pressures have been stable on  Metoprolol ER 25 mg daily and Entreso 49/51 BID. No longer on Aldactone. 2023 creatinine 1.42. GFR 47. Electrolytes normal.  Hgb 14.9.  2022 creatinine 1.33. GFR 51. Hyperlipidemia on Simvastatin 40 mg daily. 2023 Lipid profile cholesterol  137. Triglycerides 108. HDL 38. LDL 77. TSH 3.510.      2022 admission for VVI-ICD. Known non ischemic cardiomyopathy. His ICD recently discharged. This is the first time this has happened.  Repeat echo 2023 reviewed Creatinine   Date Value Ref Range Status   03/13/2023 1.42 (H) 0.60 - 1.30 mg/dL Final     Comment:     Standardized to IDMS reference method   12/19/2022 1.33 (H) 0.60 - 1.30 mg/dL Final     Comment:     Standardized to IDMS reference method   11/15/2021 0.79 0.60 - 1.30 mg/dL Final     Comment:     Standardized to IDMS reference method           Lab Results   Component Value Date    HGBA1C 6.2 (H) 03/13/2023     Lab Results   Component Value Date    WBC 8.59 03/13/2023    HGB 14.9 03/13/2023    HCT 46.4 03/13/2023    MCV 91 03/13/2023     03/13/2023     Lab Results   Component Value Date    SODIUM 137 03/13/2023    K 4.7 03/13/2023     03/13/2023    CO2 27 03/13/2023    AGAP 3 (L) 03/13/2023    BUN 27 (H) 03/13/2023    CREATININE 1.42 (H) 03/13/2023    GLUC 130 12/19/2022    GLUF 118 (H) 03/13/2023    CALCIUM 9.6 03/13/2023    AST 17 03/13/2023    ALT 25 03/13/2023    ALKPHOS 72 03/13/2023    TP 7.0 03/13/2023    TBILI 0.72 03/13/2023    EGFR 47 03/13/2023     Lab Results   Component Value Date    CHOLESTEROL 137 03/13/2023    CHOLESTEROL 120 11/15/2021    CHOLESTEROL 116 05/04/2021     Lab Results   Component Value Date    HDL 38 (L) 03/13/2023    HDL 37 (L) 11/15/2021    HDL 40 05/04/2021     Lab Results   Component Value Date    TRIG 108 03/13/2023    TRIG 75 11/15/2021    TRIG 90 05/04/2021     Lab Results   Component Value Date    LDLCALC 77 03/13/2023        Lab Results   Component Value Date    [id number] 3.510 03/13/2023     Procedure: ECHO 2D COMPLETE    Result Date: 11/17/2023  Narrative: This result has an attachment that is not available. Left Ventricle: Left ventricle is mildly dilated. There is mild concentric hypertrophy. Systolic function is severely decreased with an ejection fraction of 30-35%. Wall motion is within normal limits. There is grade I (mild) diastolic dysfunction. Left Atrium: Left atrium cavity size is normal.   Aortic Valve:  The aortic valve is trileaflet. There is no regurgitation or stenosis. Mitral Valve: Mitral valve structure is normal. There is no regurgitation or stenosis. Ascending Aorta: The aorta appears normal in size. Pericardium: There is no pericardial effusion. Left Ventricle Left ventricle is mildly dilated. There is mild concentric hypertrophy. Systolic function is severely decreased with an ejection fraction of 30-35%. Wall motion is within normal limits. There is grade I (mild) diastolic dysfunction. Right Ventricle Right ventricle cavity is normal. Systolic function is normal. Left Atrium Left atrium cavity size is normal. Right Atrium Right atrium cavity is normal. IVC/SVC The inferior vena cava demonstrates a diameter of <=21 mm and collapses >50%; therefore, the right atrial pressure is estimated at 0-5 mmHg. Mitral Valve Mitral valve structure is normal. There is no regurgitation or stenosis. Tricuspid Valve Tricuspid valve structure is normal. There is trace regurgitation. There is no evidence of tricuspid valve stenosis. Aortic Valve The aortic valve is trileaflet. There is no regurgitation or stenosis. Pulmonic Valve Pulmonic valve structure is normal. There is no regurgitation or stenosis. Ascending Aorta The aorta appears normal in size. Pericardium There is no pericardial effusion. Study Details A complete 2D echocardiogram was performed. Color flow, Pulse Wave and Continuous Wave Doppler was performed and analyzed. Overall the study quality was adequate. Review of Systems   Constitutional:  Negative for appetite change, chills, fever and unexpected weight change. HENT:  Negative for congestion, ear pain, rhinorrhea, sore throat and trouble swallowing. Eyes:  Negative for visual disturbance. Respiratory:  Positive for cough (chronic no hemoptysis). Negative for shortness of breath and wheezing. Adeno CA DEEPALI lung. He opted not to have surgery.   He completed XRT 03/2023. 09/2023 CT scan chest Consolidative left upper lobe opacity at site of previous mass redemonstrated. Currently, it demonstrates slightly decreased central density/fullness compared to previous PET/CT. This measures about 3.6 x 1.7 cm (2/52). When remeasured in a similar  fashion by myself on the prior PET/CT this measured 4.4 x 1.7 cm. There is some bronchiectasis noted in this area as well as peripheral groundglass density. Overall appearance suggests diminishing tumor mass in a background of developing radiation  changes. Overlying increased pleural thickening in this area compared to the prior study could reflect reactive posttreatment changes. Stable tiny left lower lobe nodules. Fusiform ectasia ascending thoracic aorta measuring 40 mm. PET/CT scan 06/2023 Now mild patchy FDG uptake at the site of the previously seen left upper lobe lesion, decreased from the prior exam. Underlying nodular density noted to be slightly larger, overall given decreased activity still likely related to posttreatment  changes. No new findings suspicious for hypermetabolic metastasis. Ex-smoker. COPD on Breo daily. Exertional dyspnea no changes. No orthopnea or PND   Cardiovascular:  Negative for chest pain, palpitations and leg swelling. See HPI  History of nonischemic cardiomyopathy followed by Cardiology. 11/2022   Left Ventricle: Left ventricle is mildly dilated. There is mild septal  asymmetric hypertrophy. Systolic function is severely decreased with an  ejection fraction of 30-35%. Global hypokinesis. There is grade I (mild)  diastolic dysfunction. Right Ventricle: Right ventricle cavity is normal. Systolic function is low normal. Device wire present in the ventricle Left Atrium: Left atrium cavity is mildly dilated. Aortic Valve: The aortic valve is trileaflet. The leaflets are mildly thickened. There is no regurgitation. Mitral Valve: There is trace regurgitation. Tricuspid Valve: There is trace regurgitation.   Pulmonic Valve: The estimated pulmonary artery systolic pressure is 04.6 mmHg. Normal pulmonary artery pressure. 07/2021 nuclear stress test consistent with severe nonischemic cardiomyopathy with severely dilated left ventricle. EF 27%. Severe global hypokinesis. No reversible defects to suggest ischemia    Gastrointestinal:  Negative for abdominal pain, blood in stool, constipation, diarrhea, nausea and vomiting.        OV 11/2021 for weight loss. 12/2021 EGD normal Colonoscopy normal except for 1 polyp. 12/2021 abdominal u/s normal except for bilateral renal cysts. 12/2021 celiac/mesenteric duplex  aorta is patent and ectatic measuring 2.8cm at its greatest diameter. >70% stenosis in the celiac artery. Velocities decrease with inspiration which may be suggestive of median arcuate ligament syndrome. Splenic and hepatic arteries are patent.  >70% stenosis in the superior mesenteric artery in a fasting state. The inferior mesenteric artery is normal and patent. Endocrine: Negative for polydipsia and polyuria. Subclinical hypothyroidism not on medication    Lab Results       Component                Value               Date                       [id number]             3.510               03/13/2023                                      Genitourinary:  Negative for difficulty urinating. Musculoskeletal:  Positive for back pain. Negative for arthralgias and myalgias. Skin:  Negative for rash. Status post resection BCC left cheek and left neck. 05/2019 s/p resection SCC in situ left lower back. Allergic/Immunologic: Negative for environmental allergies. Neurological:  Positive for dizziness and headaches. 02/2022 CT scan head No acute hemorrhage, mass or ischemia identified. Areas of supratentorial white matter low attenuation likely chronic small vessel ischemic disease. Ventricular system, basal cisterns and extra-axial spaces: Prominent compatible   generalized parenchymal volume loss. Hematological:  Negative for adenopathy. Does not bruise/bleed easily. Lab Results       Component                Value               Date                       HCT                      46.4                03/13/2023                 HGB                      14.9                03/13/2023                 MCV                      91                  03/13/2023                 PLT                      313                 03/13/2023                 WBC                      8.59                03/13/2023                           Platelets       Date                     Value               Ref Range           Status                05/04/2021               406 (H)             149 - 390 Thou*     Final                 01/21/2021               307                 149 - 390 Thou*     Final                 02/10/2020               299                 149 - 390 Thou*     Final                 Psychiatric/Behavioral:  Negative for dysphoric mood and sleep disturbance.         Past Medical History:   Diagnosis Date    Angina pectoris ([address])     "in the past"    Basal cell carcinoma     Resolved 8/25/2017     Colitis 11/22/2017    COPD (chronic obstructive pulmonary disease) (HCC)     Diabetes mellitus (HCC)     NIDDM    Hyperlipidemia     Hypertension     Lung cancer ([address])     Malignant melanoma ([address]) 02/24/2020     Past Surgical History:   Procedure Laterality Date    CARDIAC SURGERY  2014    cardiac cath    COLONOSCOPY      HAND SURGERY Left     IR BIOPSY LUNG  12/19/2022    KNEE ARTHROSCOPY Right     NECK SURGERY      bone graft and plates    OH EXCISION MALIGNANT LESION F/E/E/N/L >4.0 CM N/A 04/29/2016    Procedure: LEFT CHEEK BCC EXCISION; FROZEN SECTION; BILATERAL LOWER EYELIDS SUSPICIOUS LESION EXCISION; LEFT CHEEK EXTRACTION OF COMEDONES X 2;  Surgeon: Sabino Jolly MD;  Location: [facility];  Service: Plastics    [place] EXCISION MALIGNANT LESION TRUNK/ARM/LEG > 4.0 CM Left 05/30/2019    Procedure: EXCISION WIDE LESION TRUNK/ABDOMEN/BACK;  Surgeon: Mahnaz Cerna MD;  Location: [facility];  Service: General     Family History   Problem Relation Age of Onset    Stomach cancer Mother     Colon cancer Sister      Social History     Socioeconomic History    Marital status: /Civil Union     Spouse name: None    Number of children: None    Years of education: None    Highest education level: None   Occupational History    None   Tobacco Use    Smoking status: Former     Packs/day: 2.00     Years: 45.00     Total pack years: 90.00     Types: Cigarettes     Start date: 0     Quit date: 10/4/2017     Years since quittin.1    Smokeless tobacco: Never   Vaping Use    Vaping Use: Never used   Substance and Sexual Activity    Alcohol use: Yes     Alcohol/week: 1.0 standard drink of alcohol     Types: 1 Cans of beer per week     Comment: 1 beer a week    Drug use: Never    Sexual activity: None   Other Topics Concern    None   Social History Narrative    Former smoker - As per [employer]      Social Determinants of Health     Financial Resource Strain: Low Risk  (2023)    Overall Financial Resource Strain (CARDIA)     Difficulty of Paying Living Expenses: Not hard at all   Food Insecurity: Not on file   Transportation Needs: No Transportation Needs (2023)    PRAPARE - Transportation     Lack of Transportation (Medical): No     Lack of Transportation (Non-Medical): No   Physical Activity: Not on file   Stress: Not on file   Social Connections: Not on file   Intimate Partner Violence: Not on file   Housing Stability: Not on file     Current Outpatient Medications on File Prior to Visit   Medication Sig    aspirin 81 MG tablet Take 81 mg by mouth daily.     Fluticasone Furoate-Vilanterol (Breo Ellipta) 100-25 mcg/actuation inhaler USE 1 INHALATION BY MOUTH  DAILY    metFORMIN (GLUCOPHAGE) 1000 MG tablet TAKE 1 TABLET BY MOUTH TWICE  DAILY WITH MEALS    metoprolol succinate (TOPROL-XL) 25 mg 24 hr tablet Take 1 tablet (25 mg total) by mouth daily    sacubitril-valsartan (Entresto) 49-51 MG TABS Take 1 tablet by mouth 2 (two) times a day    simvastatin (ZOCOR) 40 mg tablet Take 1 tablet (40 mg total) by mouth daily at bedtime for 90 days    spironolactone (ALDACTONE) 25 mg tablet     nitroglycerin (NITROSTAT) 0.4 mg SL tablet  (Patient not taking: Reported on 1/24/2023)     No Known Allergies  Immunization History   Administered Date(s) Administered    COVID-19 PFIZER VACCINE 0.3 ML IM 08/10/2021, 08/31/2021    INFLUENZA 11/11/2013, 10/24/2014, 10/27/2017, 10/08/2018, 09/24/2019, 11/18/2022    Influenza Split High Dose Preservative Free IM 10/08/2018, 09/24/2019    Influenza, high dose seasonal 0.7 mL 11/09/2021    Pneumococcal Conjugate 13-Valent 10/27/2017    Pneumococcal Conjugate Vaccine 20-valent (Pcv20), Polysace 11/18/2022    Pneumococcal Polysaccharide PPV23 10/27/2018    TD (adult) Preservative Free 08/21/2019    Td (adult), adsorbed 08/21/2019       Objective     /72 (BP Location: Left arm, Patient Position: Sitting, Cuff Size: Standard)   Pulse 83   Temp 97.8 °F (36.6 °C)   Resp 16   Ht 5' 8" (1.727 m)   Wt 79.4 kg (175 lb)   SpO2 97%   BMI 26.61 kg/m²      BP Readings from Last 3 Encounters:   12/07/23 130/72   09/19/23 121/74   06/13/23 130/88        Wt Readings from Last 3 Encounters:   12/07/23 79.4 kg (175 lb)   09/19/23 79.9 kg (176 lb 2.4 oz)   06/13/23 78.5 kg (173 lb)        Physical Exam  Vitals and nursing note reviewed. Constitutional:       General: He is not in acute distress. Appearance: He is well-developed. HENT:      Right Ear: Tympanic membrane and ear canal normal.      Left Ear: Tympanic membrane and ear canal normal.   Eyes:      General: No scleral icterus. Extraocular Movements: Extraocular movements intact. Conjunctiva/sclera: Conjunctivae normal.      Pupils: Pupils are equal, round, and reactive to light. Neck:      Thyroid: No thyroid mass or thyromegaly. Vascular: No carotid bruit or JVD. Trachea: No tracheal deviation. Cardiovascular:      Rate and Rhythm: Normal rate and regular rhythm. Pulses: no weak pulses          Carotid pulses are 2+ on the right side and 2+ on the left side. Dorsalis pedis pulses are 2+ on the right side and 2+ on the left side. Posterior tibial pulses are 2+ on the right side and 2+ on the left side. Heart sounds: Normal heart sounds. No murmur heard. No gallop. Pulmonary:      Effort: Pulmonary effort is normal. No respiratory distress. Breath sounds: Normal breath sounds. No wheezing or rales. Musculoskeletal:      Right lower leg: No edema. Left lower leg: No edema. Feet:      Right foot:      Skin integrity: No ulcer, skin breakdown, erythema, warmth, callus or dry skin. Left foot:      Skin integrity: No ulcer, skin breakdown, erythema, warmth, callus or dry skin. Lymphadenopathy:      Cervical: No cervical adenopathy. Upper Body:      Right upper body: No supraclavicular adenopathy. Left upper body: No supraclavicular adenopathy. Skin:     Coloration: Skin is not cyanotic. Findings: No rash. Nails: There is no clubbing. Neurological:      General: No focal deficit present. Mental Status: He is alert and oriented to person, place, and time. Psychiatric:         Mood and Affect: Mood normal.       Patient's shoes and socks removed. Right Foot/Ankle   Right Foot Inspection  Skin Exam: skin normal and skin intact. No dry skin, no warmth, no callus, no erythema, no maceration, no abnormal color, no pre-ulcer, no ulcer and no callus. Toe Exam: ROM and strength within normal limits. No swelling, no tenderness, erythema and  no right toe deformity    Sensory   Monofilament testing: intact    Vascular  Capillary refills: < 3 seconds  The right DP pulse is 2+. The right PT pulse is 2+.      Left Foot/Ankle  Left Foot Inspection  Skin Exam: skin normal and skin intact. No dry skin, no warmth, no erythema, no maceration, normal color, no pre-ulcer, no ulcer and no callus. Toe Exam: ROM and strength within normal limits. No swelling, no tenderness, no erythema and no left toe deformity. Sensory   Monofilament testing: intact    Vascular  Capillary refills: < 3 seconds  The left DP pulse is 2+. The left PT pulse is 2+.      Assign Risk Category  No deformity present  No loss of protective sensation  No weak pulses  Risk: 0         Matteo Bingham MD

## 2023-12-08 ENCOUNTER — TELEPHONE (OUTPATIENT)
Age: 78
End: 2023-12-08

## 2023-12-08 ENCOUNTER — APPOINTMENT (OUTPATIENT)
Dept: LAB | Age: 78
End: 2023-12-08
Payer: COMMERCIAL

## 2023-12-08 DIAGNOSIS — D64.9 NORMOCYTIC ANEMIA: ICD-10-CM

## 2023-12-08 LAB
ALBUMIN SERPL BCP-MCNC: 4.5 G/DL (ref 3.5–5)
ALP SERPL-CCNC: 61 U/L (ref 34–104)
ALT SERPL W P-5'-P-CCNC: 12 U/L (ref 7–52)
ANION GAP SERPL CALCULATED.3IONS-SCNC: 10 MMOL/L
AST SERPL W P-5'-P-CCNC: 21 U/L (ref 13–39)
BASOPHILS # BLD AUTO: 0.06 THOUSANDS/ÂΜL (ref 0–0.1)
BASOPHILS NFR BLD AUTO: 1 % (ref 0–1)
BILIRUB SERPL-MCNC: 0.44 MG/DL (ref 0.2–1)
BUN SERPL-MCNC: 27 MG/DL (ref 5–25)
CALCIUM SERPL-MCNC: 9.2 MG/DL (ref 8.4–10.2)
CHLORIDE SERPL-SCNC: 108 MMOL/L (ref 96–108)
CHOLEST SERPL-MCNC: 144 MG/DL
CO2 SERPL-SCNC: 23 MMOL/L (ref 21–32)
CREAT SERPL-MCNC: 1.17 MG/DL (ref 0.6–1.3)
EOSINOPHIL # BLD AUTO: 0.13 THOUSAND/ÂΜL (ref 0–0.61)
EOSINOPHIL NFR BLD AUTO: 2 % (ref 0–6)
ERYTHROCYTE [DISTWIDTH] IN BLOOD BY AUTOMATED COUNT: 13.4 % (ref 11.6–15.1)
EST. AVERAGE GLUCOSE BLD GHB EST-MCNC: 140 MG/DL
FERRITIN SERPL-MCNC: 50 NG/ML (ref 24–336)
GFR SERPL CREATININE-BSD FRML MDRD: 60 ML/MIN/1.73SQ M
GLUCOSE P FAST SERPL-MCNC: 116 MG/DL (ref 65–99)
HBA1C MFR BLD: 6.5 %
HCT VFR BLD AUTO: 44.7 % (ref 36.5–49.3)
HDLC SERPL-MCNC: 39 MG/DL
HGB BLD-MCNC: 14.6 G/DL (ref 12–17)
IMM GRANULOCYTES # BLD AUTO: 0.03 THOUSAND/UL (ref 0–0.2)
IMM GRANULOCYTES NFR BLD AUTO: 0 % (ref 0–2)
IRON SATN MFR SERPL: 25 % (ref 15–50)
IRON SERPL-MCNC: 88 UG/DL (ref 50–212)
LDLC SERPL CALC-MCNC: 83 MG/DL (ref 0–100)
LYMPHOCYTES # BLD AUTO: 2.43 THOUSANDS/ÂΜL (ref 0.6–4.47)
LYMPHOCYTES NFR BLD AUTO: 28 % (ref 14–44)
MCH RBC QN AUTO: 29.3 PG (ref 26.8–34.3)
MCHC RBC AUTO-ENTMCNC: 32.7 G/DL (ref 31.4–37.4)
MCV RBC AUTO: 90 FL (ref 82–98)
MONOCYTES # BLD AUTO: 0.64 THOUSAND/ÂΜL (ref 0.17–1.22)
MONOCYTES NFR BLD AUTO: 8 % (ref 4–12)
NEUTROPHILS # BLD AUTO: 5.3 THOUSANDS/ÂΜL (ref 1.85–7.62)
NEUTS SEG NFR BLD AUTO: 61 % (ref 43–75)
NONHDLC SERPL-MCNC: 105 MG/DL
NRBC BLD AUTO-RTO: 0 /100 WBCS
PLATELET # BLD AUTO: 287 THOUSANDS/UL (ref 149–390)
PMV BLD AUTO: 9.3 FL (ref 8.9–12.7)
POTASSIUM SERPL-SCNC: 5.9 MMOL/L (ref 3.5–5.3)
PROT SERPL-MCNC: 6.7 G/DL (ref 6.4–8.4)
RBC # BLD AUTO: 4.99 MILLION/UL (ref 3.88–5.62)
SODIUM SERPL-SCNC: 141 MMOL/L (ref 135–147)
TIBC SERPL-MCNC: 348 UG/DL (ref 250–450)
TRIGL SERPL-MCNC: 108 MG/DL
UIBC SERPL-MCNC: 260 UG/DL (ref 155–355)
WBC # BLD AUTO: 8.59 THOUSAND/UL (ref 4.31–10.16)

## 2023-12-08 PROCEDURE — 83550 IRON BINDING TEST: CPT

## 2023-12-08 PROCEDURE — 82728 ASSAY OF FERRITIN: CPT

## 2023-12-08 PROCEDURE — 83540 ASSAY OF IRON: CPT

## 2023-12-08 NOTE — TELEPHONE ENCOUNTER
Called patient, patient put the phone in speaker, gave him and wife the message  Wife states that the potassium is her fault because she would give a banana for a treat because of patient diabetes.    Would like lab order mailed for recheck please place

## 2023-12-11 DIAGNOSIS — E87.5 HYPERKALEMIA: Primary | ICD-10-CM

## 2023-12-14 ENCOUNTER — TELEPHONE (OUTPATIENT)
Age: 78
End: 2023-12-14

## 2023-12-14 ENCOUNTER — APPOINTMENT (OUTPATIENT)
Dept: LAB | Age: 78
End: 2023-12-14
Payer: COMMERCIAL

## 2023-12-14 DIAGNOSIS — E87.5 HYPERKALEMIA: ICD-10-CM

## 2023-12-14 LAB
ANION GAP SERPL CALCULATED.3IONS-SCNC: 10 MMOL/L
BUN SERPL-MCNC: 24 MG/DL (ref 5–25)
CALCIUM SERPL-MCNC: 9.2 MG/DL (ref 8.4–10.2)
CHLORIDE SERPL-SCNC: 105 MMOL/L (ref 96–108)
CO2 SERPL-SCNC: 25 MMOL/L (ref 21–32)
CREAT SERPL-MCNC: 1.1 MG/DL (ref 0.6–1.3)
GFR SERPL CREATININE-BSD FRML MDRD: 64 ML/MIN/1.73SQ M
GLUCOSE P FAST SERPL-MCNC: 115 MG/DL (ref 65–99)
POTASSIUM SERPL-SCNC: 5.7 MMOL/L (ref 3.5–5.3)
SODIUM SERPL-SCNC: 140 MMOL/L (ref 135–147)

## 2023-12-14 PROCEDURE — 80048 BASIC METABOLIC PNL TOTAL CA: CPT

## 2023-12-14 PROCEDURE — 36415 COLL VENOUS BLD VENIPUNCTURE: CPT

## 2024-01-09 ENCOUNTER — TELEPHONE (OUTPATIENT)
Dept: FAMILY MEDICINE CLINIC | Facility: CLINIC | Age: 79
End: 2024-01-09

## 2024-01-09 DIAGNOSIS — E87.5 HYPERKALEMIA: Primary | ICD-10-CM

## 2024-01-09 NOTE — TELEPHONE ENCOUNTER
Call repeat potassium improved from 5.9 to 5.7  normal 3.5 to 5.3. this could medication related. Is he still taking Spirolactone?  I would repeat BMP this month. Order placed         Lab Results   Component Value Date    SODIUM 140 12/14/2023    K 5.7 (H) 12/14/2023     12/14/2023    CO2 25 12/14/2023    BUN 24 12/14/2023    CREATININE 1.10 12/14/2023    GLUC 130 12/19/2022    CALCIUM 9.2 12/14/2023     Current Outpatient Medications   Medication Instructions    aspirin 81 mg, Oral, Daily    Fluticasone Furoate-Vilanterol (Breo Ellipta) 100-25 mcg/actuation inhaler USE 1 INHALATION BY MOUTH  DAILY    metFORMIN (GLUCOPHAGE) 1,000 mg, Oral, 2 times daily with meals    metoprolol succinate (TOPROL-XL) 25 mg, Oral, Daily    nitroglycerin (NITROSTAT) 0.4 mg SL tablet No dose, route, or frequency recorded.    sacubitril-valsartan (Entresto) 49-51 MG TABS 1 tablet, Oral, 2 times daily    simvastatin (ZOCOR) 40 mg, Oral, Daily at bedtime    spironolactone (ALDACTONE) 25 mg tablet No dose, route, or frequency recorded.

## 2024-01-09 NOTE — TELEPHONE ENCOUNTER
Called patient, spoke with wife & patient gave them the message.  Patient dose take spirolactone 25 mg tablet but he cuts it in half so patient is taken 12.5mg.  Please place lab order for patient.

## 2024-01-09 NOTE — TELEPHONE ENCOUNTER
Patient called back and wanted to know why  wanted to redo blood works   Gave patient message again and patient asked he can eat in the morning before getting blood work told patient he needs to fast. Patient understood

## 2024-01-11 ENCOUNTER — HOSPITAL ENCOUNTER (OUTPATIENT)
Dept: RADIOLOGY | Facility: HOSPITAL | Age: 79
Discharge: HOME | End: 2024-01-11
Attending: RADIOLOGY
Payer: COMMERCIAL

## 2024-01-11 DIAGNOSIS — C34.12 MALIGNANT NEOPLASM OF UPPER LOBE OF LEFT LUNG (HCC): ICD-10-CM

## 2024-01-11 PROCEDURE — G1004 CDSM NDSC: HCPCS

## 2024-01-11 PROCEDURE — 71250 CT THORAX DX C-: CPT

## 2024-01-18 ENCOUNTER — CLINICAL SUPPORT (OUTPATIENT)
Dept: RADIATION ONCOLOGY | Facility: CLINIC | Age: 79
End: 2024-01-18
Attending: RADIOLOGY
Payer: COMMERCIAL

## 2024-01-18 ENCOUNTER — RADIATION ONCOLOGY FOLLOW-UP (OUTPATIENT)
Dept: RADIATION ONCOLOGY | Facility: CLINIC | Age: 79
End: 2024-01-18
Payer: COMMERCIAL

## 2024-01-18 VITALS
BODY MASS INDEX: 25.86 KG/M2 | SYSTOLIC BLOOD PRESSURE: 119 MMHG | HEIGHT: 68 IN | RESPIRATION RATE: 18 BRPM | OXYGEN SATURATION: 99 % | DIASTOLIC BLOOD PRESSURE: 74 MMHG | WEIGHT: 170.64 LBS | HEART RATE: 92 BPM | TEMPERATURE: 97.6 F

## 2024-01-18 DIAGNOSIS — C34.12 MALIGNANT NEOPLASM OF UPPER LOBE OF LEFT LUNG (HCC): Primary | ICD-10-CM

## 2024-01-18 PROCEDURE — G0463 HOSPITAL OUTPT CLINIC VISIT: HCPCS | Performed by: RADIOLOGY

## 2024-01-18 PROCEDURE — 99211 OFF/OP EST MAY X REQ PHY/QHP: CPT | Performed by: RADIOLOGY

## 2024-01-18 PROCEDURE — 99213 OFFICE O/P EST LOW 20 MIN: CPT | Performed by: RADIOLOGY

## 2024-01-18 NOTE — PROGRESS NOTES
Follow-up - Radiation Oncology   Magdaleno Tinoco 1945 78 y.o. male [id number]      History of Present Illness   Cancer Staging   Malignant neoplasm of upper lobe of left lung (HCC)  Staging form: Lung, AJCC 8th Edition  - Clinical stage from 2023: Stage IA3 (cT1c, cN0, cM0) - Signed by Anthony Gorman MD on 2023  Histopathologic type: Adenocarcinoma, NOS  Stage prefix: Initial diagnosis  Laterality: Left  Tumor size (mm): 23  Stage used in treatment planning: Yes  National guidelines used in treatment planning: Yes      Magdaleno Tinoco is a 78 y.o. year old male who returns for follow-up visit s/p completion of SBRT left upper lung lobe on 3/3/23 for Stage IA3 adenocarcinoma of DEEPALI lung. Last seen in radiation oncology on 23.     Interval History:  24 CT chest wo contrast  IMPRESSION:  No evidence of recurrent or new malignancy in the chest.  Mildly improved left upper lobe posttreatment changes.  Other stable, nonemergent findings as detailed.     He is seen for follow-up today with his daughter.  He reports he has stable shortness of breath on exertion and none at rest. He also has a stable mild cough and often clears his throat. He has no sputum production.  He denies any hemoptysis.  He has a good appetite with no dysphagia.  He does not smoke any tobacco since quitting 6 years ago in 2017.  He has a previous history of skin cancers with melanoma and basal cell carcinomas that have been excised which have not recurred.  He denies any new or suspicious skin lesions.     Upcomin24 Dr. Davalos PCP     Historical Information   Oncology History   Malignant neoplasm of upper lobe of left lung (HCC)   2022 Biopsy    A.  Lung, left upper lobe nodule, biopsy:     - Adenocarcinoma compatible with a lung primary.     2022 Initial Diagnosis    Malignant neoplasm of upper lobe of left lung (HCC)     2023 -  Cancer Staged    Staging form: Lung, AJCC 8th Edition  -  "Clinical stage from 1/24/2023: Stage IA3 (cT1c, cN0, cM0) - Signed by Anthony Gorman MD on 1/24/2023  Histopathologic type: Adenocarcinoma, NOS  Stage prefix: Initial diagnosis  Laterality: Left  Tumor size (mm): 23  Stage used in treatment planning: Yes  National guidelines used in treatment planning: Yes       2/21/2023 - 3/3/2023 Radiation    Course: C1 SBRT    Plan ID Energy Fractions Dose per Fraction (cGy) Dose Correction (cGy) Total Dose Delivered (cGy) Elapsed Days   SBRT DEEPALI 6X 5 / 5 1,000 0 5,000 10      Treatment dates:  C1 SBRT: 2/21/2023 - 3/3/2023             Past Medical History:   Diagnosis Date    Angina pectoris (HCC)     \"in the past\"    Basal cell carcinoma     Resolved 8/25/2017     Colitis 11/22/2017    COPD (chronic obstructive pulmonary disease) (HCC)     Diabetes mellitus (HCC)     NIDDM    Hyperlipidemia     Hypertension     Lung cancer (HCC)     Malignant melanoma (HCC) 02/24/2020     Past Surgical History:   Procedure Laterality Date    CARDIAC SURGERY  2014    cardiac cath    COLONOSCOPY      HAND SURGERY Left     IR BIOPSY LUNG  12/19/2022    KNEE ARTHROSCOPY Right     NECK SURGERY      bone graft and plates    WI EXCISION MALIGNANT LESION F/E/E/N/L >4.0 CM N/A 04/29/2016    Procedure: LEFT CHEEK BCC EXCISION; FROZEN SECTION; BILATERAL LOWER EYELIDS SUSPICIOUS LESION EXCISION; LEFT CHEEK EXTRACTION OF COMEDONES X 2;  Surgeon: Declan Paulino MD;  [facility]: [facility];  Service: Plastics    WI EXCISION MALIGNANT LESION TRUNK/ARM/LEG > 4.0 CM Left 05/30/2019    Procedure: EXCISION WIDE LESION TRUNK/ABDOMEN/BACK;  Surgeon: Ramon Ayala MD;  Location: [facility];  Service: General       Social History   Social History     Substance and Sexual Activity   Alcohol Use Not Currently    Alcohol/week: 1.0 standard drink of alcohol    Types: 1 Cans of beer per week    Comment: 1 beer a week     Social History     Substance and Sexual Activity   Drug Use Never     Social History     Tobacco " Use   Smoking Status Former    Current packs/day: 0.00    Average packs/day: 2.0 packs/day for 45.8 years (91.5 ttl pk-yrs)    Types: Cigarettes    Start date:     Quit date: 10/4/2017    Years since quittin.2   Smokeless Tobacco Never     Meds/Allergies     Current Outpatient Medications:     aspirin 81 MG tablet, Take 81 mg by mouth daily., Disp: , Rfl:     Fluticasone Furoate-Vilanterol (Breo Ellipta) 100-25 mcg/actuation inhaler, USE 1 INHALATION BY MOUTH  DAILY, Disp: 84 each, Rfl: 3    metFORMIN (GLUCOPHAGE) 1000 MG tablet, TAKE 1 TABLET BY MOUTH TWICE  DAILY WITH MEALS, Disp: 180 tablet, Rfl: 3    metoprolol succinate (TOPROL-XL) 25 mg 24 hr tablet, Take 1 tablet (25 mg total) by mouth daily, Disp: 90 tablet, Rfl: 3    sacubitril-valsartan (Entresto) 49-51 MG TABS, Take 1 tablet by mouth 2 (two) times a day, Disp: , Rfl:     simvastatin (ZOCOR) 40 mg tablet, Take 1 tablet (40 mg total) by mouth daily at bedtime for 90 days, Disp: 90 tablet, Rfl: 3    spironolactone (ALDACTONE) 25 mg tablet, , Disp: , Rfl:     nitroglycerin (NITROSTAT) 0.4 mg SL tablet, , Disp: , Rfl:   No Known Allergies    Review of Systems  Constitutional:  Positive for fatigue. Negative for appetite change, chills, fever and unexpected weight change.   HENT:  Positive for voice change (feels like has hoarse voice, states since RT). Negative for sore throat and trouble swallowing.    Eyes:  Negative for visual disturbance.         glasses   Respiratory:  Positive for cough (frequent  non productive cough) and shortness of breath (with activity/ walking up stairs). Negative for chest tightness and wheezing.    Cardiovascular: Negative.  Negative for chest pain and leg swelling.        Has pacemaker, per daughter potassium has been high and low, pt is stopping bananas d/t pacemaker going off   Gastrointestinal:  Positive for abdominal pain (on and off), nausea and vomiting (was sick recently with episode of emesis). Negative for blood  "in stool, constipation and diarrhea.        Frequent dry heaves when coughing   Endocrine: Negative.    Genitourinary: Negative.  Negative for difficulty urinating, dysuria and hematuria.   Musculoskeletal:  Positive for arthralgias and back pain (chronic lower back pain).        Osteoarthritis, reports having plates in his neck   Skin: Negative.    Allergic/Immunologic: Negative.    Neurological:  Positive for dizziness (When laying down and getting up too fast gets dizzy, goes away after a couple of minutes), weakness (generalized) and light-headedness (when rising). Negative for numbness and headaches.   Hematological:  Bruises/bleeds easily.   Psychiatric/Behavioral:  Negative for sleep disturbance.       OBJECTIVE:   /74 (BP Location: Right arm, Patient Position: Sitting, Cuff Size: Standard)   Pulse 92   Temp 97.6 °F (36.4 °C) (Temporal)   Resp 18   Ht 5' 8\" (1.727 m)   Wt 77.4 kg (170 lb 10.2 oz)   SpO2 99%   BMI 25.95 kg/m²   Pain Assessment:  0  ECOG/Zubrod/WHO: 1 - Symptomatic but completely ambulatory    Physical Exam   Vitals and nursing note reviewed.   Constitutional:       General: He is not in acute distress.     Appearance: He is well-developed. He is not diaphoretic.   HENT:      Head: Normocephalic and atraumatic.      Mouth/Throat:      Pharynx: No oropharyngeal exudate.   Eyes:      General: No scleral icterus.     Conjunctiva/sclera: Conjunctivae normal.      Pupils: Pupils are equal, round, and reactive to light.   Neck:      Thyroid: No thyromegaly.      Trachea: No tracheal deviation.   Cardiovascular:      Rate and Rhythm: Normal rate and regular rhythm.      Heart sounds: Normal heart sounds.   Pulmonary:      Effort: Pulmonary effort is normal. No respiratory distress.      Breath sounds: Normal breath sounds. No stridor. No wheezing, rhonchi or rales.   Chest:      Chest wall: No tenderness.   Abdominal:      General: Bowel sounds are normal. There is no distension.      " Palpations: Abdomen is soft. There is no mass.      Tenderness: There is no abdominal tenderness.   Musculoskeletal:         General: No swelling or tenderness. Normal range of motion.      Cervical back: Normal range of motion and neck supple.   Lymphadenopathy:      Cervical: No cervical adenopathy.      Upper Body:      Right upper body: No supraclavicular adenopathy.      Left upper body: No supraclavicular adenopathy.   Skin:     General: Skin is warm and dry.      Coloration: Skin is not jaundiced or pale.      Findings: No erythema or rash.   Neurological:      General: No focal deficit present.      Mental Status: He is alert and oriented to person, place, and time.      Cranial Nerves: No cranial nerve deficit.      Coordination: Coordination normal.   Psychiatric:         Mood and Affect: Mood normal.         Behavior: Behavior normal.         Thought Content: Thought content normal.         Judgment: Judgment normal.     RESULTS    Lab Results: No results found for this or any previous visit (from the past 672 hour(s)).    Imaging Studies:CT chest wo contrast    Result Date: 1/17/2024  Narrative: CT CHEST WITHOUT IV CONTRAST INDICATION:   C34.12: Malignant neoplasm of upper lobe, left bronchus or lung. Status post SBRT ending 3/3/2023. Non-small cell lung cancer. COPD. Also history of malignant melanoma. COMPARISON: 9/13/2023 and 9/20/2022 TECHNIQUE: CT examination of the chest was performed.   Axial, sagittal, and coronal 2D reformatted images were created from the source data and submitted for interpretation. Radiation dose length product (DLP) for this visit:  355.13 mGy-cm .  This examination, like all CT scans performed in the [facility], was performed utilizing techniques to minimize radiation dose exposure, including the use of iterative  reconstruction and automated exposure control. IV Contrast: Not administered. FINDINGS: LUNGS: Few, tiny, chronic peripheral nodules, including  some calcified granulomas. Linear left upper lobe opacity with traction-type bronchiolectasis and volume loss compatible with treatment change. Mildly decreased consolidation. No discrete nodule. Similar emphysema. Patent central airways. PLEURA: Similar focal fat in the right major fissure. Mild, similar left anterior upper lobe pleural thickening adjacent to parenchymal treatment change. HEART/GREAT VESSELS: Left pacemaker leads in the right atria and ventricle. Normal heart size. Similar top normal ascending aortic caliber, 3.9 cm. MEDIASTINUM AND DELANO:  Within normal limits. CHEST WALL AND LOWER NECK:   Similar gynecomastia. VISUALIZED STRUCTURES IN THE UPPER ABDOMEN: Simple right cortical renal cyst. 1.4 cm, benign left adrenal adenoma. OSSEOUS STRUCTURES: Degenerative changes. Anterior cervical fusion.     Impression: No evidence of recurrent or new malignancy in the chest. Mildly improved left upper lobe posttreatment changes. Other stable, nonemergent findings above. Workstation performed: [id number]     Assessment/Plan:  Orders Placed This Encounter   Procedures    CT chest wo contrast        Magdaleno Tinoco is a 78 y.o. 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  He had a CAT scan of the chest without contrast on September 20, 2022 that revealed a 2 cm spiculated cavitary nodule in the left upper lobe.  He had a PET/CT study November 1, 2022 that confirmed a 2.3 cm hypermetabolic spiculated left upper lobe nodule that could represent malignancy.  There was no evidence of any mediastinal lymphadenopathy.  Left upper lobe lung biopsy December 19, 2022 confirmed adenocarcinoma compatible with lung primary.  He was seen by Dr. Baxter from thoracic surgery on December 28, 2022 and offered surgical resection versus radiation therapy using stereotactic body radiotherapy.  He was reviewed on January 9, 2023 at thoracic oncology multidisciplinary case review.   "Recommendations were made for cardiopulmonary exercise stress test that was scheduled for January 13, 2023 and then follow-up with Dr. Baxter to discuss potential surgery.  He notified thoracic surgery on January 17, 2022 he does not wish to pursue surgical resection. He was seen for consultation to pursue definitive radiation therapy using stereotactic body radiotherapy.  We discussed that this can be just as effective as surgery and avoid the risks of surgery. We reviewed his imaging studies including CAT scan of the chest and PET/CT with the patient, his wife and daughter. His left upper lobe lung primary was below the region of his ICD located in the left upper chest.  He  received stereotactic body radiotherapy without any dose to his ICD.  He completed treatments in the [facility] on March 3, 2023.     He is seen for follow-up today.  He is doing well from a respiratory standpoint.  He had a repeat CT of the chest without contrast September 13, 2023 that reveals some posttreatment changes with decrease in central density indicating further response to treatment which is in a background of radiation changes.  There were some stable tiny left lower lobe nodules and no new nodules.  His most recent chest CT January 11, 2024 revealed no evidence of recurrent disease and no new malignancy in the chest.  There was some mild improvement of the left upper lobe posttreatment changes.  He is doing well and remains without any evidence of disease.  CT results were discussed with him and his daughter.  He will return in 6 months for follow-up with a repeat chest CT without contrast.  He also has follow-up with Dr. Davalos on June 7, 2024.       Anthony Gorman MD  1/18/2024,9:39 AM    Portions of the record may have been created with voice recognition software.  Occasional wrong word or \"sound a like\" substitutions may have occurred due to the inherent limitations of voice recognition " software.  Read the chart carefully and recognize, using context, where substitutions have occurred.

## 2024-01-18 NOTE — PROGRESS NOTES
Magdaleno Tinoco 1945 is a 78 y.o. male returns for follow-up visit s/p completion of SBRT left upper lung lobe on 3/3/23 for Stage IA3 adenocarcinoma of DEEPALI lung. Last seen in radiation oncology on 9/19/23.     1/11/24 CT chest wo contrast  IMPRESSION:  No evidence of recurrent or new malignancy in the chest.  Mildly improved left upper lobe posttreatment changes.  Other stable, nonemergent findings as detailed.        Upcoming:    Follow up visit     Oncology History   Malignant neoplasm of upper lobe of left lung (HCC)   12/19/2022 Biopsy    A.  Lung, left upper lobe nodule, biopsy:     - Adenocarcinoma compatible with a lung primary.     12/28/2022 Initial Diagnosis    Malignant neoplasm of upper lobe of left lung (HCC)     1/24/2023 -  Cancer Staged    Staging form: Lung, AJCC 8th Edition  - Clinical stage from 1/24/2023: Stage IA3 (cT1c, cN0, cM0) - Signed by Anthony Gorman MD on 1/24/2023  Histopathologic type: Adenocarcinoma, NOS  Stage prefix: Initial diagnosis  Laterality: Left  Tumor size (mm): 23  Stage used in treatment planning: Yes  National guidelines used in treatment planning: Yes       2/21/2023 - 3/3/2023 Radiation    Course: C1 SBRT    Plan ID Energy Fractions Dose per Fraction (cGy) Dose Correction (cGy) Total Dose Delivered (cGy) Elapsed Days   SBRT DEEPALI 6X 5 / 5 1,000 0 5,000 10      Treatment dates:  C1 SBRT: 2/21/2023 - 3/3/2023             Review of Systems:  Review of Systems   Constitutional:  Positive for fatigue. Negative for appetite change, chills, fever and unexpected weight change.   HENT:  Positive for voice change (feels like has hoarse voice, states since RT). Negative for sore throat and trouble swallowing.    Eyes:  Negative for visual disturbance.         glasses   Respiratory:  Positive for cough (frequent  non productive cough) and shortness of breath (with activity/ walking up stairs). Negative for chest tightness and wheezing.    Cardiovascular: Negative.   Negative for chest pain and leg swelling.        Has pacemaker, per daughter potassium has been high and low, pt is stopping bananas d/t pacemaker going off   Gastrointestinal:  Positive for abdominal pain (on and off), nausea and vomiting (was sick recently with episode of emesis). Negative for blood in stool, constipation and diarrhea.        Frequent dry heaves when coughing   Endocrine: Negative.    Genitourinary: Negative.  Negative for difficulty urinating, dysuria and hematuria.   Musculoskeletal:  Positive for arthralgias and back pain (chronic lower back pain).        Osteoarthritis, reports having plates in his neck   Skin: Negative.    Allergic/Immunologic: Negative.    Neurological:  Positive for dizziness (When laying down and getting up too fast gets dizzy, goes away after a couple of minutes), weakness (generalized) and light-headedness (when rising). Negative for numbness and headaches.   Hematological:  Bruises/bleeds easily.   Psychiatric/Behavioral:  Negative for sleep disturbance.        Clinical Trial: no      Teaching completed    Health Maintenance   Topic Date Due    DM Eye Exam  Never done    Zoster Vaccine (1 of 2) Never done    COVID-19 Vaccine (3 - Pfizer risk series) 09/28/2021    Kidney Health Evaluation: Albumin/Creatinine Ratio  03/13/2024    BMI: Followup Plan  06/06/2024    Hepatitis C Screening  06/06/2024 (Originally 1945)    Hepatitis A Vaccine (1 of 2 - Risk 2-dose series) 12/07/2024 (Originally 8/6/1964)    Fall Risk  06/06/2024    Medicare Annual Wellness Visit (AWV)  06/06/2024    HEMOGLOBIN A1C  06/08/2024    BMI: Adult  12/07/2024    Diabetic Foot Exam  12/07/2024    Kidney Health Evaluation: GFR  12/14/2024    Depression Screening  01/18/2025    Pneumococcal Vaccine: 65+ Years  Completed    Influenza Vaccine  Completed    HIB Vaccine  Aged Out    IPV Vaccine  Aged Out    Meningococcal ACWY Vaccine  Aged Out    HPV Vaccine  Aged Out    Lung Cancer Screening   "Discontinued    Colorectal Cancer Screening  Discontinued     Patient Active Problem List   Diagnosis    Cardiomyopathy (HCC)    CHF (congestive heart failure), NYHA class I, chronic, systolic (HCC)    COPD (chronic obstructive pulmonary disease) (HCC)    Hyperlipidemia    Hypertension    Old myocardial infarction    Paroxysmal supraventricular tachycardia    Hyperuricemia    Lumbar degenerative disc disease    Type 2 diabetes mellitus with microalbuminuria, without long-term current use of insulin     Coronary artery disease involving native coronary artery of native heart without angina pectoris    Bilateral renal cysts    Superior mesenteric artery stenosis (HCC)    Celiac artery stenosis (HCC)    Automatic implantable cardiac defibrillator in situ    IVCD (intraventricular conduction defect)    Cigarette nicotine dependence in remission    Aneurysm of ascending aorta without rupture (HCC)    Malignant neoplasm of upper lobe of left lung (HCC)    Stage 3a chronic kidney disease (HCC)     Past Medical History:   Diagnosis Date    Angina pectoris (HCC)     \"in the past\"    Basal cell carcinoma     Resolved 8/25/2017     Colitis 11/22/2017    COPD (chronic obstructive pulmonary disease) (HCC)     Diabetes mellitus (HCC)     NIDDM    Hyperlipidemia     Hypertension     Lung cancer (HCC)     Malignant melanoma (HCC) 02/24/2020     Past Surgical History:   Procedure Laterality Date    CARDIAC SURGERY  2014    cardiac cath    COLONOSCOPY      HAND SURGERY Left     IR BIOPSY LUNG  12/19/2022    KNEE ARTHROSCOPY Right     NECK SURGERY      bone graft and plates    ND EXCISION MALIGNANT LESION F/E/E/N/L >4.0 CM N/A 04/29/2016    Procedure: LEFT CHEEK BCC EXCISION; FROZEN SECTION; BILATERAL LOWER EYELIDS SUSPICIOUS LESION EXCISION; LEFT CHEEK EXTRACTION OF COMEDONES X 2;  Surgeon: Declan Paulino MD;  [facility];  Service: Plastics    ND EXCISION MALIGNANT LESION TRUNK/ARM/LEG > 4.0 CM Left 05/30/2019    Procedure: " EXCISION WIDE LESION TRUNK/ABDOMEN/BACK;  Surgeon: Ramon Ayala MD;  [facility];  Service: General     Family History   Problem Relation Age of Onset    Stomach cancer Mother     Colon cancer Sister      Social History     Socioeconomic History    Marital status: /Civil Union     Spouse name: Not on file    Number of children: Not on file    Years of education: Not on file    Highest education level: Not on file   Occupational History    Not on file   Tobacco Use    Smoking status: Former     Current packs/day: 0.00     Average packs/day: 2.0 packs/day for 45.8 years (91.5 ttl pk-yrs)     Types: Cigarettes     Start date:      Quit date: 10/4/2017     Years since quittin.2    Smokeless tobacco: Never   Vaping Use    Vaping status: Never Used   Substance and Sexual Activity    Alcohol use: Not Currently     Alcohol/week: 1.0 standard drink of alcohol     Types: 1 Cans of beer per week     Comment: 1 beer a week    Drug use: Never    Sexual activity: Not on file   Other Topics Concern    Not on file   Social History Narrative    Former smoker - As per [employer]      Social Determinants of Health     Financial Resource Strain: Low Risk  (2023)    Overall Financial Resource Strain (CARDIA)     Difficulty of Paying Living Expenses: Not hard at all   Food Insecurity: Not on file   Transportation Needs: No Transportation Needs (2023)    PRAPARE - Transportation     Lack of Transportation (Medical): No     Lack of Transportation (Non-Medical): No   Physical Activity: Not on file   Stress: Not on file   Social Connections: Not on file   Intimate Partner Violence: Not on file   Housing Stability: Not on file       Current Outpatient Medications:     aspirin 81 MG tablet, Take 81 mg by mouth daily., Disp: , Rfl:     Fluticasone Furoate-Vilanterol (Breo Ellipta) 100-25 mcg/actuation inhaler, USE 1 INHALATION BY MOUTH  DAILY, Disp: 84 each, Rfl: 3    metFORMIN (GLUCOPHAGE) 1000 MG tablet, TAKE 1  "TABLET BY MOUTH TWICE  DAILY WITH MEALS, Disp: 180 tablet, Rfl: 3    metoprolol succinate (TOPROL-XL) 25 mg 24 hr tablet, Take 1 tablet (25 mg total) by mouth daily, Disp: 90 tablet, Rfl: 3    sacubitril-valsartan (Entresto) 49-51 MG TABS, Take 1 tablet by mouth 2 (two) times a day, Disp: , Rfl:     simvastatin (ZOCOR) 40 mg tablet, Take 1 tablet (40 mg total) by mouth daily at bedtime for 90 days, Disp: 90 tablet, Rfl: 3    spironolactone (ALDACTONE) 25 mg tablet, , Disp: , Rfl:     nitroglycerin (NITROSTAT) 0.4 mg SL tablet, , Disp: , Rfl:   No Known Allergies  Vitals:    01/18/24 0856   BP: 119/74   BP Location: Right arm   Patient Position: Sitting   Cuff Size: Standard   Pulse: 92   Resp: 18   Temp: 97.6 °F (36.4 °C)   TempSrc: Temporal   SpO2: 99%   Weight: 77.4 kg (170 lb 10.2 oz)   Height: 5' 8\" (1.727 m)      Pain Score:   7  "

## 2024-01-26 ENCOUNTER — APPOINTMENT (OUTPATIENT)
Dept: LAB | Age: 79
End: 2024-01-26
Payer: COMMERCIAL

## 2024-01-26 DIAGNOSIS — E87.5 HYPERKALEMIA: ICD-10-CM

## 2024-01-26 LAB
ANION GAP SERPL CALCULATED.3IONS-SCNC: 7 MMOL/L
BUN SERPL-MCNC: 34 MG/DL (ref 5–25)
CALCIUM SERPL-MCNC: 9.3 MG/DL (ref 8.4–10.2)
CHLORIDE SERPL-SCNC: 105 MMOL/L (ref 96–108)
CO2 SERPL-SCNC: 26 MMOL/L (ref 21–32)
CREAT SERPL-MCNC: 1.27 MG/DL (ref 0.6–1.3)
GFR SERPL CREATININE-BSD FRML MDRD: 54 ML/MIN/1.73SQ M
GLUCOSE P FAST SERPL-MCNC: 108 MG/DL (ref 65–99)
POTASSIUM SERPL-SCNC: 5.1 MMOL/L (ref 3.5–5.3)
SODIUM SERPL-SCNC: 138 MMOL/L (ref 135–147)

## 2024-01-26 PROCEDURE — 80048 BASIC METABOLIC PNL TOTAL CA: CPT

## 2024-01-26 PROCEDURE — 36415 COLL VENOUS BLD VENIPUNCTURE: CPT

## 2024-01-29 ENCOUNTER — TELEPHONE (OUTPATIENT)
Dept: FAMILY MEDICINE CLINIC | Facility: CLINIC | Age: 79
End: 2024-01-29

## 2024-01-29 NOTE — TELEPHONE ENCOUNTER
Call patient repeat potassium now within normal range  no additional testing needed at this time [facility]     Lab Results   Component Value Date    SODIUM 138 01/26/2024    K 5.1 01/26/2024     01/26/2024    CO2 26 01/26/2024    BUN 34 (H) 01/26/2024    CREATININE 1.27 01/26/2024    GLUC 130 12/19/2022    CALCIUM 9.3 01/26/2024

## 2024-03-29 ENCOUNTER — RA CDI HCC (OUTPATIENT)
Dept: OTHER | Facility: HOSPITAL | Age: 79
End: 2024-03-29

## 2024-03-29 NOTE — PROGRESS NOTES
HCC coding opportunities          Chart Reviewed number of suggestions sent to Provider: 3     Patients Insurance   I13.0, I42.9 and I71.21  Medicare Insurance: [employer] Advantage

## 2024-04-02 DIAGNOSIS — J44.9 CHRONIC OBSTRUCTIVE PULMONARY DISEASE, UNSPECIFIED COPD TYPE (HCC): ICD-10-CM

## 2024-04-03 ENCOUNTER — OFFICE VISIT (OUTPATIENT)
Dept: FAMILY MEDICINE CLINIC | Facility: CLINIC | Age: 79
End: 2024-04-03
Payer: COMMERCIAL

## 2024-04-03 VITALS
HEART RATE: 90 BPM | SYSTOLIC BLOOD PRESSURE: 130 MMHG | RESPIRATION RATE: 16 BRPM | OXYGEN SATURATION: 100 % | TEMPERATURE: 97.8 F | DIASTOLIC BLOOD PRESSURE: 74 MMHG | HEIGHT: 68 IN | WEIGHT: 172 LBS | BODY MASS INDEX: 26.07 KG/M2

## 2024-04-03 DIAGNOSIS — I42.8 NON-ISCHEMIC CARDIOMYOPATHY (HCC): ICD-10-CM

## 2024-04-03 DIAGNOSIS — J44.9 CHRONIC OBSTRUCTIVE PULMONARY DISEASE, UNSPECIFIED COPD TYPE (HCC): ICD-10-CM

## 2024-04-03 DIAGNOSIS — R80.9 TYPE 2 DIABETES MELLITUS WITH MICROALBUMINURIA, WITHOUT LONG-TERM CURRENT USE OF INSULIN (HCC): ICD-10-CM

## 2024-04-03 DIAGNOSIS — E11.29 TYPE 2 DIABETES MELLITUS WITH MICROALBUMINURIA, WITHOUT LONG-TERM CURRENT USE OF INSULIN (HCC): ICD-10-CM

## 2024-04-03 DIAGNOSIS — I47.10 PAROXYSMAL SUPRAVENTRICULAR TACHYCARDIA: ICD-10-CM

## 2024-04-03 DIAGNOSIS — N18.31 STAGE 3A CHRONIC KIDNEY DISEASE (HCC): ICD-10-CM

## 2024-04-03 DIAGNOSIS — I77.1 CELIAC ARTERY STENOSIS (HCC): ICD-10-CM

## 2024-04-03 DIAGNOSIS — J18.9 PNEUMONIA OF LEFT UPPER LOBE DUE TO INFECTIOUS ORGANISM: Primary | ICD-10-CM

## 2024-04-03 DIAGNOSIS — I50.22 CHF (CONGESTIVE HEART FAILURE), NYHA CLASS I, CHRONIC, SYSTOLIC (HCC): ICD-10-CM

## 2024-04-03 DIAGNOSIS — C34.12 MALIGNANT NEOPLASM OF UPPER LOBE OF LEFT LUNG (HCC): ICD-10-CM

## 2024-04-03 PROCEDURE — G2211 COMPLEX E/M VISIT ADD ON: HCPCS | Performed by: FAMILY MEDICINE

## 2024-04-03 PROCEDURE — 99214 OFFICE O/P EST MOD 30 MIN: CPT | Performed by: FAMILY MEDICINE

## 2024-04-03 RX ORDER — FLUTICASONE FUROATE AND VILANTEROL TRIFENATATE 100; 25 UG/1; UG/1
1 POWDER RESPIRATORY (INHALATION) DAILY
Qty: 180 EACH | Refills: 1 | Status: SHIPPED | OUTPATIENT
Start: 2024-04-03

## 2024-04-03 NOTE — PROGRESS NOTES
Name: Magdaleno Tinoco      : 1945      MRN: [id number]  Encounter Provider: Hill Davalos MD  Encounter Date: 4/3/2024   Encounter department: [facility]    Assessment & Plan     1. Pneumonia of left upper lobe due to infectious organism  -     XR chest pa & lateral; Future; Expected date: 2024    2. Malignant neoplasm of upper lobe of left lung (HCC)    3. Chronic obstructive pulmonary disease, unspecified COPD type (HCC)    4. Type 2 diabetes mellitus with microalbuminuria, without long-term current use of insulin (HCC)    5. CHF (congestive heart failure), NYHA class I, chronic, systolic (HCC)    6. Non-ischemic cardiomyopathy (HCC)    7. Paroxysmal supraventricular tachycardia    8. Celiac artery stenosis (HCC)    9. Stage 3a chronic kidney disease (HCC)    Finish antibiotics.  Symptom treatment for cough.  Repeat chest x-ray in 3 to 4 weeks.  Advised to call if any changes.       Subjective     Follow up visit from ER 2024.  Here with his daughter.  Patient was diagnosed with pneumonia and discharged on oral antibiotics-Augmentin.  He presented with cough, chest pain, shortness of breath and general malaise.  No fevers or chills. CXR Abnormal parenchymal density in the left midlung field has worsened and may extend towards the hilar region.  No CHF.  Elevated D-dimer resulting in a CTA of chest-no pulmonary embolus.  Patchy left upper lobe airspace opacity with air bronchograms most compatible with infiltrate.  Some asymmetric pleural thickening in the superior left hemithorax anterior laterally.  No pathologically enlarged mediastinal or hilar lymphadenopathy.  No pleural effusion.  Asymmetric pleural thickening along the anterolateral margin of the superior left hemithorax.  Aorta not aneurysmal. COVID-19/influenza/RSV negative. CBC normal.  WBC 10,000.  Chemistry profile random blood glucose 88.  Creatinine 1.18.  GFR 64.  Electrolytes normal.  LFTs normal.EKG normal  sinus rhythm.  Left axis deviation.  Nonspecific intraventricular conduction block. BNP normal at 58.  Overall symptomatically improved.  Cough has diminished- still with reproducible chest wall pain. No pleuritic pain or increased shortness of breath.  Appetite improving.  No fevers or chills.       Type 2 DM  on Metformin 1,000 mg daily. 12/2023 A1c  6.5. 03/2023 Urine albumin elevated at 120  On ARB.   No hypoglycemic events. No DPN. Current with eye exam     Hypertension/CKD stage 3 blood pressures have been stable on  Metoprolol ER 25 mg daily and Entreso 49/51 BID. No longer on Aldactone.  03/2023 creatinine 1.42. GFR 47. Electrolytes normal.  Hgb 14.9.  12/2022 creatinine 1.33. GFR 51.     Hyperlipidemia on Simvastatin 40 mg daily. 11/2023 Lipid profile cholesterol  137. Triglycerides 108. HDL 38. LDL 77. TSH 3.510.      Lab Results       Component                Value               Date                       HGBA1C                   6.5 (H)             12/08/2023 01/2022 admission for VVI-ICD. Known non ischemic cardiomyopathy. His ICD recently discharged.This is the first time this has happened. Repeat echo 11/2023 reviewed           Review of Systems   Constitutional:  Positive for fatigue. Negative for appetite change, chills, fever and unexpected weight change.   HENT:  Negative for congestion, ear pain, rhinorrhea, sore throat and trouble swallowing.    Eyes:  Negative for visual disturbance.   Respiratory:  Positive for cough. Negative for shortness of breath and wheezing.         See HPI. Adeno CA DEEPALI lung. He opted not to have surgery.  He completed XRT 03/2023. CT scan chest 09/2023   Consolidative left upper lobe opacity at site of previous mass redemonstrated. Currently, it demonstrates slightly decreased central density/fullness compared to previous PET/CT. This measures about 3.6 x 1.7 cm (2/52). When remeasured in a similar  fashion by myself on the prior PET/CT this measured 4.4  x 1.7 cm. There is some bronchiectasis noted in this area as well as peripheral groundglass density. Overall appearance suggests diminishing tumor mass in a background of developing radiation  changes. Overlying increased pleural thickening in this area compared to the prior study could reflect reactive posttreatment changes.  Stable tiny left lower lobe nodules. Fusiform ectasia ascending thoracic aorta measuring 40 mm.  PET/CT scan 06/2023 Now mild patchy FDG uptake at the site of the previously seen left upper lobe lesion, decreased from the prior exam. Underlying nodular density noted to be slightly larger, overall given decreased activity still likely related to posttreatment  changes. No new findings suspicious for hypermetabolic metastasis.      Ex-smoker. COPD on Breo daily.  Exertional dyspnea no changes.     Cardiovascular:  Negative for chest pain, palpitations and leg swelling.        See HPI  History of nonischemic cardiomyopathy followed by Cardiology.  11/2023 echocardiogram-left ventricle mildly dilated.  Mild concentric hypertrophy.  Left ventricular systolic function severely reduced with EF 30 to 35%.  Wall motion within normal limits.  Grade 1 mild diastolic dysfunction.  No significant valvular disease.  Aorta normal size.  No pericardial effusion.      07/2021 nuclear stress test consistent with severe nonischemic cardiomyopathy with severely dilated left ventricle.  EF 27%.  Severe global hypokinesis.  No reversible defects to suggest ischemia    Gastrointestinal:  Negative for abdominal pain, blood in stool, constipation, diarrhea, nausea and vomiting.        OV 11/2021 for weight loss. 12/2021 EGD normal Colonoscopy normal except for 1 polyp. 12/2021 abdominal u/s normal except for bilateral renal cysts. 12/2021 celiac/mesenteric duplex  aorta is patent and ectatic measuring 2.8cm at its greatest diameter. >70% stenosis in the celiac artery. Velocities decrease with inspiration which may be  suggestive of median arcuate ligament syndrome. Splenic and hepatic arteries are patent.  >70% stenosis in the superior mesenteric artery in a fasting state. The inferior mesenteric artery is normal and patent.      Endocrine: Negative for polydipsia and polyuria.        Subclinical hypothyroidism not on medication    Lab Results       Component                Value               Date                       [id number]             3.510               03/13/2023                                      Genitourinary:  Negative for difficulty urinating.   Musculoskeletal:  Positive for back pain. Negative for arthralgias and myalgias.   Skin:  Negative for rash.         Status post resection BCC left cheek and left neck. 05/2019 s/p resection SCC in situ left lower back.    Allergic/Immunologic: Negative for environmental allergies.   Neurological:  Negative for dizziness and headaches.        02/2022 CT scan head No acute hemorrhage, mass or ischemia identified. Areas of supratentorial white matter low attenuation likely chronic small vessel ischemic disease. Ventricular system, basal cisterns and extra-axial spaces: Prominent compatible   generalized parenchymal volume loss.       Hematological:  Negative for adenopathy. Does not bruise/bleed easily.        Lab Results       Component                Value               Date                       WBC                      8.59                12/08/2023                 HGB                      14.6                12/08/2023                 HCT                      44.7                12/08/2023                 MCV                      90                  12/08/2023                 PLT                      287                 12/08/2023                                  Platelets       Date                     Value               Ref Range           Status                05/04/2021               406 (H)             149 - 390 Thou*     Final                 01/21/2021          "      307                 149 - 390 Thou*     Final                 02/10/2020               299                 149 - 390 Thou*     Final                 Psychiatric/Behavioral:  Negative for dysphoric mood and sleep disturbance.        Past Medical History:   Diagnosis Date    Angina pectoris (HCC)     \"in the past\"    Basal cell carcinoma     Resolved 2017     Colitis 2017    COPD (chronic obstructive pulmonary disease) (HCC)     Diabetes mellitus (HCC)     NIDDM    Hyperlipidemia     Hypertension     Lung cancer (HCC)     Malignant melanoma (HCC) 2020     Past Surgical History:   Procedure Laterality Date    CARDIAC SURGERY      cardiac cath    COLONOSCOPY      HAND SURGERY Left     IR BIOPSY LUNG  2022    KNEE ARTHROSCOPY Right     NECK SURGERY      bone graft and plates    CA EXCISION MALIGNANT LESION F/E/E/N/L >4.0 CM N/A 2016    Procedure: LEFT CHEEK BCC EXCISION; FROZEN SECTION; BILATERAL LOWER EYELIDS SUSPICIOUS LESION EXCISION; LEFT CHEEK EXTRACTION OF COMEDONES X 2;  Surgeon: Declan Paulino MD;  Location: [facility];  Service: Plastics    CA EXCISION MALIGNANT LESION TRUNK/ARM/LEG > 4.0 CM Left 2019    Procedure: EXCISION WIDE LESION TRUNK/ABDOMEN/BACK;  Surgeon: Ramon Ayala MD;  Location: [facility];  Service: General     Family History   Problem Relation Age of Onset    Stomach cancer Mother     Colon cancer Sister      Social History     Socioeconomic History    Marital status: /Civil Union     Spouse name: None    Number of children: None    Years of education: None    Highest education level: None   Occupational History    None   Tobacco Use    Smoking status: Former     Current packs/day: 0.00     Average packs/day: 2.0 packs/day for 45.8 years (91.5 ttl pk-yrs)     Types: Cigarettes     Start date:      Quit date: 10/4/2017     Years since quittin.5    Smokeless tobacco: Never   Vaping Use    Vaping status: Never Used   Substance and Sexual " Activity    Alcohol use: Not Currently     Alcohol/week: 1.0 standard drink of alcohol     Types: 1 Cans of beer per week     Comment: 1 beer a week    Drug use: Never    Sexual activity: Not Currently   Other Topics Concern    None   Social History Narrative    Former smoker - As per [employer]      Social Determinants of Health     Financial Resource Strain: Low Risk  (6/6/2023)    Overall Financial Resource Strain (CARDIA)     Difficulty of Paying Living Expenses: Not hard at all   Food Insecurity: Not on file   Transportation Needs: No Transportation Needs (6/6/2023)    PRAPARE - Transportation     Lack of Transportation (Medical): No     Lack of Transportation (Non-Medical): No   Physical Activity: Not on file   Stress: Not on file   Social Connections: Not on file   Intimate Partner Violence: Not on file   Housing Stability: Not on file     Current Outpatient Medications on File Prior to Visit   Medication Sig    aspirin 81 MG tablet Take 81 mg by mouth daily.    Fluticasone Furoate-Vilanterol (Breo Ellipta) 100-25 mcg/actuation inhaler USE 1 INHALATION BY MOUTH DAILY    metFORMIN (GLUCOPHAGE) 1000 MG tablet TAKE 1 TABLET BY MOUTH TWICE  DAILY WITH MEALS    metoprolol succinate (TOPROL-XL) 25 mg 24 hr tablet Take 1 tablet (25 mg total) by mouth daily    sacubitril-valsartan (Entresto) 49-51 MG TABS Take 1 tablet by mouth 2 (two) times a day    simvastatin (ZOCOR) 40 mg tablet Take 1 tablet (40 mg total) by mouth daily at bedtime for 90 days    spironolactone (ALDACTONE) 25 mg tablet     nitroglycerin (NITROSTAT) 0.4 mg SL tablet  (Patient not taking: Reported on 1/24/2023)    [DISCONTINUED] Fluticasone Furoate-Vilanterol (Breo Ellipta) 100-25 mcg/actuation inhaler USE 1 INHALATION BY MOUTH  DAILY     No Known Allergies  Immunization History   Administered Date(s) Administered    COVID-19 PFIZER VACCINE 0.3 ML IM 08/10/2021, 08/31/2021    INFLUENZA 11/11/2013, 10/24/2014, 10/27/2017, 10/08/2018, 09/24/2019,  "11/18/2022, 09/22/2023    Influenza Split High Dose Preservative Free IM 10/08/2018, 09/24/2019    Influenza, high dose seasonal 0.7 mL 11/09/2021    Pneumococcal Conjugate 13-Valent 10/27/2017    Pneumococcal Conjugate Vaccine 20-valent (Pcv20), Polysace 11/18/2022    Pneumococcal Polysaccharide PPV23 10/27/2018    TD (adult) Preservative Free 08/21/2019    Td (adult), adsorbed 08/21/2019       Objective     /74 (BP Location: Left arm, Patient Position: Sitting, Cuff Size: Large)   Pulse 90   Temp 97.8 °F (36.6 °C)   Resp 16   Ht 5' 8\" (1.727 m)   Wt 78 kg (172 lb)   SpO2 100%   BMI 26.15 kg/m²     BP Readings from Last 3 Encounters:   04/03/24 130/74   01/18/24 119/74   12/07/23 130/72      Wt Readings from Last 3 Encounters:   04/03/24 78 kg (172 lb)   01/18/24 77.4 kg (170 lb 10.2 oz)   12/07/23 79.4 kg (175 lb)        Physical Exam  Vitals and nursing note reviewed.   Constitutional:       General: He is not in acute distress.     Appearance: He is well-developed.   HENT:      Right Ear: Tympanic membrane and ear canal normal.      Left Ear: Tympanic membrane and ear canal normal.      Nose:      Right Sinus: No maxillary sinus tenderness or frontal sinus tenderness.      Left Sinus: No maxillary sinus tenderness or frontal sinus tenderness.      Mouth/Throat:      Mouth: No oral lesions.      Pharynx: No posterior oropharyngeal erythema.   Eyes:      General: No scleral icterus.     Conjunctiva/sclera: Conjunctivae normal.   Neck:      Thyroid: No thyroid mass or thyromegaly.      Vascular: Normal carotid pulses. No carotid bruit or JVD.   Cardiovascular:      Rate and Rhythm: Normal rate and regular rhythm.      Heart sounds: Normal heart sounds. No murmur heard.     No gallop.   Pulmonary:      Breath sounds: Normal breath sounds. No wheezing or rales.      Comments: Right and left anterior chest wall tenderness  Chest:      Chest wall: Tenderness present.   Lymphadenopathy:      Cervical: No " cervical adenopathy.   Skin:     Coloration: Skin is not cyanotic.      Findings: No rash.      Nails: There is no clubbing.   Neurological:      Mental Status: He is alert and oriented to person, place, and time.   Psychiatric:         Mood and Affect: Mood normal.       Hill Davalos MD

## 2024-05-20 ENCOUNTER — TELEPHONE (OUTPATIENT)
Dept: FAMILY MEDICINE CLINIC | Facility: CLINIC | Age: 79
End: 2024-05-20

## 2024-05-31 ENCOUNTER — RA CDI HCC (OUTPATIENT)
Dept: OTHER | Facility: HOSPITAL | Age: 79
End: 2024-05-31

## 2024-05-31 NOTE — PROGRESS NOTES
I13.0, I71.21  HCC coding opportunities          Chart Reviewed number of suggestions sent to Provider: 2     Patients Insurance     Medicare Insurance: [employer]vantage

## 2024-06-07 ENCOUNTER — OFFICE VISIT (OUTPATIENT)
Dept: FAMILY MEDICINE CLINIC | Facility: CLINIC | Age: 79
End: 2024-06-07
Payer: COMMERCIAL

## 2024-06-07 VITALS
SYSTOLIC BLOOD PRESSURE: 130 MMHG | WEIGHT: 176.5 LBS | RESPIRATION RATE: 20 BRPM | DIASTOLIC BLOOD PRESSURE: 76 MMHG | OXYGEN SATURATION: 98 % | HEIGHT: 68 IN | BODY MASS INDEX: 26.75 KG/M2 | TEMPERATURE: 97.6 F | HEART RATE: 76 BPM

## 2024-06-07 DIAGNOSIS — K55.1 SUPERIOR MESENTERIC ARTERY STENOSIS (HCC): ICD-10-CM

## 2024-06-07 DIAGNOSIS — R80.9 TYPE 2 DIABETES MELLITUS WITH MICROALBUMINURIA, WITHOUT LONG-TERM CURRENT USE OF INSULIN (HCC): Primary | ICD-10-CM

## 2024-06-07 DIAGNOSIS — N18.31 STAGE 3A CHRONIC KIDNEY DISEASE (HCC): ICD-10-CM

## 2024-06-07 DIAGNOSIS — E78.2 MIXED HYPERLIPIDEMIA: ICD-10-CM

## 2024-06-07 DIAGNOSIS — J44.9 CHRONIC OBSTRUCTIVE PULMONARY DISEASE, UNSPECIFIED COPD TYPE (HCC): ICD-10-CM

## 2024-06-07 DIAGNOSIS — I25.5 ISCHEMIC CARDIOMYOPATHY: ICD-10-CM

## 2024-06-07 DIAGNOSIS — C34.12 MALIGNANT NEOPLASM OF UPPER LOBE OF LEFT LUNG (HCC): ICD-10-CM

## 2024-06-07 DIAGNOSIS — I71.21 ANEURYSM OF ASCENDING AORTA WITHOUT RUPTURE (HCC): ICD-10-CM

## 2024-06-07 DIAGNOSIS — I77.1 CELIAC ARTERY STENOSIS (HCC): ICD-10-CM

## 2024-06-07 DIAGNOSIS — I10 ESSENTIAL HYPERTENSION: ICD-10-CM

## 2024-06-07 DIAGNOSIS — I25.10 CORONARY ARTERY DISEASE INVOLVING NATIVE CORONARY ARTERY OF NATIVE HEART WITHOUT ANGINA PECTORIS: ICD-10-CM

## 2024-06-07 DIAGNOSIS — I47.10 PAROXYSMAL SUPRAVENTRICULAR TACHYCARDIA: ICD-10-CM

## 2024-06-07 DIAGNOSIS — I10 PRIMARY HYPERTENSION: ICD-10-CM

## 2024-06-07 DIAGNOSIS — Z00.00 MEDICARE ANNUAL WELLNESS VISIT, SUBSEQUENT: ICD-10-CM

## 2024-06-07 DIAGNOSIS — I50.22 CHF (CONGESTIVE HEART FAILURE), NYHA CLASS I, CHRONIC, SYSTOLIC (HCC): ICD-10-CM

## 2024-06-07 DIAGNOSIS — E11.29 TYPE 2 DIABETES MELLITUS WITH MICROALBUMINURIA, WITHOUT LONG-TERM CURRENT USE OF INSULIN (HCC): Primary | ICD-10-CM

## 2024-06-07 PROCEDURE — 99214 OFFICE O/P EST MOD 30 MIN: CPT | Performed by: FAMILY MEDICINE

## 2024-06-07 PROCEDURE — G0439 PPPS, SUBSEQ VISIT: HCPCS | Performed by: FAMILY MEDICINE

## 2024-06-07 RX ORDER — FLUTICASONE FUROATE AND VILANTEROL 100; 25 UG/1; UG/1
1 POWDER RESPIRATORY (INHALATION) DAILY
Qty: 180 EACH | Refills: 3 | Status: SHIPPED | OUTPATIENT
Start: 2024-06-07

## 2024-06-07 NOTE — PROGRESS NOTES
Ambulatory Visit  Name: Magdaleno Tinoco      : 1945      MRN: [id number]  Encounter Provider: Hill Davalos MD  Encounter Date: 2024   Encounter department: [facility]    Assessment & Plan   1. Type 2 diabetes mellitus with microalbuminuria, without long-term current use of insulin (HCC)  -     Hemoglobin A1C  -     Albumin / creatinine urine ratio; Future  2. Primary hypertension  3. Stage 3a chronic kidney disease (HCC)  4. Mixed hyperlipidemia  -     Lipid panel  5. CHF (congestive heart failure), NYHA class I, chronic, systolic (HCC)  6. Paroxysmal supraventricular tachycardia  7. Ischemic cardiomyopathy  8. Coronary artery disease involving native coronary artery of native heart without angina pectoris  9. Superior mesenteric artery stenosis (HCC)  10. Celiac artery stenosis (HCC)  11. Aneurysm of ascending aorta without rupture (HCC)  12. Malignant neoplasm of upper lobe of left lung (HCC)  13. Chronic obstructive pulmonary disease, unspecified COPD type (HCC)  -     Fluticasone Furoate-Vilanterol (Breo Ellipta) 100-25 mcg/actuation inhaler; Inhale 1 puff daily  14. Medicare annual wellness visit, subsequent    Continue with current medications.  Repeat labs.  Office visit 6 months.  Follow-up with multiple specialists. He is not interested in any additional adult vaccines.       Depression Screening and Follow-up Plan: Patient was screened for depression during today's encounter. They screened negative with a PHQ-2 score of 0.      Preventive health issues were discussed with patient, and age appropriate screening tests were ordered as noted in patient's After Visit Summary. Personalized health advice and appropriate referrals for health education or preventive services given if needed, as noted in patient's After Visit Summary.    History of Present Illness     Follow up OV for chronic medical problems/AWV. Medications reviewed. Here with his daughter.     Type 2 DM  on  Metformin 1,000 mg daily. 12/2023 A1c  6.5. 03/2023 Urine albumin elevated at 120  On ARB.   No hypoglycemic events. No DPN. Current with eye exam     Hypertension/CKD stage 3 blood pressures have been stable on  Metoprolol ER 25 mg daily and Entreso 49/51 BID. No longer on Aldactone.  03/2024 creatinine 1.18. GFR 64. Electrolytes normal. Hgb 14.2. 03/2023 creatinine 1.42. GFR 47.  12/2022 creatinine 1.33. GFR 51.     Hyperlipidemia on Simvastatin 40 mg daily. 11/2023 Lipid profile cholesterol  137. Triglycerides 108. HDL 38. LDL 77. TSH 3.510.          03/2024 ER visit.Dx pneumonia He was discharged on oral antibiotics-Augmentin. CXR Abnormal parenchymal density in the left midlung field has worsened and may extend towards the hilar region.  No CHF.  Elevated D-dimer resulting in a CTA of chest-no pulmonary embolus.  Patchy left upper lobe airspace opacity with air bronchograms most compatible with infiltrate.  Some asymmetric pleural thickening in the superior left hemithorax anterior laterally.  No pathologically enlarged mediastinal or hilar lymphadenopathy.  No pleural effusion.  Asymmetric pleural thickening along the anterolateral margin of the superior left hemithorax.  Aorta not aneurysmal. COVID-19/influenza/RSV negative. Repeat chest x-ray 5/2024 lung fields remain well aerated.  No infiltrates edema or effusions.  Hilar and mediastinal compartments are without adenopathy.      01/2022 admission for VVI-ICD. Known non ischemic cardiomyopathy.Repeat echo 11/2023 reviewed            Patient Care Team:  Hill Davalos MD as PCP - General  MD Hudson Monsalve MD (Cardiology)  Ramon Ayala MD (General Surgery)  Bhargav Mercer DO (Pulmonary Disease)  Manny Baxter MD as Surgeon (Thoracic Surgery)  Anthony Gorman MD (Radiation Oncology)    Review of Systems   Constitutional:  Positive for fatigue and unexpected weight change (6 lb weight gain from 01/2024). Negative for  appetite change, chills and fever.   HENT:  Negative for congestion, ear pain, rhinorrhea, sore throat and trouble swallowing.    Eyes:  Negative for visual disturbance.   Respiratory:  Positive for cough (chronic non productive. no hemoptysis). Negative for shortness of breath and wheezing.         See HPI. Adeno CA DEEPALI lung. He opted not to have surgery.  He completed XRT 03/2023. CT scan chest 09/2023   Consolidative left upper lobe opacity at site of previous mass redemonstrated. Currently, it demonstrates slightly decreased central density/fullness compared to previous PET/CT. This measures about 3.6 x 1.7 cm (2/52). When remeasured in a similar  fashion by myself on the prior PET/CT this measured 4.4 x 1.7 cm. There is some bronchiectasis noted in this area as well as peripheral groundglass density. Overall appearance suggests diminishing tumor mass in a background of developing radiation  changes. Overlying increased pleural thickening in this area compared to the prior study could reflect reactive posttreatment changes.  Stable tiny left lower lobe nodules. Fusiform ectasia ascending thoracic aorta measuring 40 mm.  PET/CT scan 06/2023 Now mild patchy FDG uptake at the site of the previously seen left upper lobe lesion, decreased from the prior exam. Underlying nodular density noted to be slightly larger, overall given decreased activity still likely related to posttreatment  changes. No new findings suspicious for hypermetabolic metastasis.      Ex-smoker. COPD on Breo daily.  Exertional dyspnea no changes.     Cardiovascular:  Negative for chest pain, palpitations and leg swelling.        See HPI  Nonischemic cardiomyopathy followed by Cardiology.  11/2023 echocardiogram-left ventricle mildly dilated.  Mild concentric hypertrophy.  Left ventricular systolic function severely reduced with EF 30 to 35%.  Wall motion within normal limits.  Grade 1 mild diastolic dysfunction.  No significant valvular disease.   Aorta normal size.  No pericardial effusion.      07/2021 nuclear stress test consistent with severe nonischemic cardiomyopathy with severely dilated left ventricle.  EF 27%.  Severe global hypokinesis.  No reversible defects to suggest ischemia    Gastrointestinal:  Negative for abdominal pain, blood in stool, constipation, diarrhea, nausea and vomiting.        OV 11/2021 for weight loss. 12/2021 EGD normal Colonoscopy normal except for 1 polyp. 12/2021 abdominal u/s normal except for bilateral renal cysts. 12/2021 celiac/mesenteric duplex  aorta is patent and ectatic measuring 2.8cm at its greatest diameter. >70% stenosis in the celiac artery. Velocities decrease with inspiration which may be suggestive of median arcuate ligament syndrome. Splenic and hepatic arteries are patent.  >70% stenosis in the superior mesenteric artery in a fasting state. The inferior mesenteric artery is normal and patent.      Endocrine: Negative for polydipsia and polyuria.        Subclinical hypothyroidism not on medication    Lab Results       Component                Value               Date                       [id number]             3.510               03/13/2023                 TSH                      1.43                11/11/2021                                             Genitourinary:  Negative for difficulty urinating.   Musculoskeletal:  Positive for back pain. Negative for arthralgias and myalgias.   Skin:  Negative for rash.         Status post resection BCC left cheek and left neck. 05/2019 s/p resection SCC in situ left lower back.    Allergic/Immunologic: Negative for environmental allergies.   Neurological:  Negative for dizziness and headaches.        02/2022 CT scan head No acute hemorrhage, mass or ischemia identified. Areas of supratentorial white matter low attenuation likely chronic small vessel ischemic disease. Ventricular system, basal cisterns and extra-axial spaces: Prominent compatible   generalized  parenchymal volume loss.       Hematological:  Negative for adenopathy. Does not bruise/bleed easily.        Lab Results       Component                Value               Date                       WBC                      8.59                12/08/2023                 HGB                      14.6                12/08/2023                 HCT                      44.7                12/08/2023                 MCV                      90                  12/08/2023                 PLT                      287                 12/08/2023                                  Platelets       Date                     Value               Ref Range           Status                05/04/2021               406 (H)             149 - 390 Thou*     Final                 01/21/2021               307                 149 - 390 Thou*     Final                 02/10/2020               299                 149 - 390 Thou*     Final                 Psychiatric/Behavioral:  Negative for dysphoric mood and sleep disturbance.      Medical History Reviewed by provider this encounter:       Annual Wellness Visit Questionnaire   Magdaleno is here for his Subsequent Wellness visit. Last Medicare Wellness visit information reviewed, patient interviewed and updates made to the record today.      Health Risk Assessment:   Patient rates overall health as fair. Patient feels that their physical health rating is slightly better. Patient is satisfied with their life. Eyesight was rated as same. Hearing was rated as same. Patient feels that their emotional and mental health rating is same. Patients states they are never, rarely angry. Patient states they are sometimes unusually tired/fatigued. Pain experienced in the last 7 days has been some. Patient's pain rating has been 6/10. Patient states that he has experienced no weight loss or gain in last 6 months.     Depression Screening:   PHQ-2 Score: 0      Fall Risk Screening:   In the past year, patient has  experienced: no history of falling in past year      Home Safety:  Patient does not have trouble with stairs inside or outside of their home. Patient has working smoke alarms and has working carbon monoxide detector. Home safety hazards include: none.     Nutrition:   Current diet is Regular and Diabetic.     Medications:   Patient is currently taking over-the-counter supplements. OTC medications include: see medication list. Patient is able to manage medications.     Activities of Daily Living (ADLs)/Instrumental Activities of Daily Living (IADLs):   Walk and transfer into and out of bed and chair?: Yes  Dress and groom yourself?: Yes    Bathe or shower yourself?: Yes    Feed yourself? Yes  Do your laundry/housekeeping?: Yes  Manage your money, pay your bills and track your expenses?: Yes  Make your own meals?: Yes    Do your own shopping?: Yes    Previous Hospitalizations:   Any hospitalizations or ED visits within the last 12 months?: Yes    How many hospitalizations have you had in the last year?: 1-2    Advance Care Planning:   Living will: Yes    Advanced directive: Yes      Cognitive Screening:   Provider or family/friend/caregiver concerned regarding cognition?: No    PREVENTIVE SCREENINGS      Cardiovascular Screening:    General: History Lipid Disorder    Due for: Lipid Panel      Diabetes Screening:     General: Screening Not Indicated and History Diabetes      Colorectal Cancer Screening:     General: Screening Current      Prostate Cancer Screening:    General: Screening Not Indicated      Osteoporosis Screening:    General: Screening Not Indicated      Abdominal Aortic Aneurysm (AAA) Screening:    Risk factors include: tobacco use        General: Screening Current      Lung Cancer Screening:     General: Screening Not Indicated and History Lung Cancer      Hepatitis C Screening:    General: Patient Declines    Screening, Brief Intervention, and Referral to Treatment (SBIRT)    Screening  Typical number  "of drinks in a day: 0  Typical number of drinks in a week: 0  Interpretation: Low risk drinking behavior.    Single Item Drug Screening:  How often have you used an illegal drug (including marijuana) or a prescription medication for non-medical reasons in the past year? never    Single Item Drug Screen Score: 0  Interpretation: Negative screen for possible drug use disorder    Brief Intervention  Alcohol & drug use screenings were reviewed. No concerns regarding substance use disorder identified.     Other Counseling Topics:   Skin self-exam, sunscreen and calcium and vitamin D intake and regular weightbearing exercise.     Social Determinants of Health     Financial Resource Strain: Low Risk  (6/6/2023)    Overall Financial Resource Strain (CARDIA)     Difficulty of Paying Living Expenses: Not hard at all   Transportation Needs: No Transportation Needs (6/6/2023)    PRAPARE - Transportation     Lack of Transportation (Medical): No     Lack of Transportation (Non-Medical): No         Objective     /76 (BP Location: Left arm, Patient Position: Sitting, Cuff Size: Large)   Pulse 76   Temp 97.6 °F (36.4 °C)   Resp 20   Ht 5' 8\" (1.727 m)   Wt 80.1 kg (176 lb 8 oz)   SpO2 98%   BMI 26.84 kg/m²     BP Readings from Last 3 Encounters:   06/07/24 130/76   04/03/24 130/74   01/18/24 119/74      Wt Readings from Last 3 Encounters:   06/07/24 80.1 kg (176 lb 8 oz)   04/03/24 78 kg (172 lb)   01/18/24 77.4 kg (170 lb 10.2 oz)       Physical Exam  Constitutional:       General: He is not in acute distress.  HENT:      Right Ear: Tympanic membrane and ear canal normal.      Left Ear: Tympanic membrane and ear canal normal.      Mouth/Throat:      Mouth: No oral lesions.      Pharynx: Oropharynx is clear.      Comments: Edentulous  Eyes:      General: No scleral icterus.     Extraocular Movements: Extraocular movements intact.      Conjunctiva/sclera: Conjunctivae normal.      Pupils: Pupils are equal, round, and " reactive to light.   Neck:      Thyroid: No thyroid mass or thyromegaly.      Vascular: Normal carotid pulses. No carotid bruit or JVD.      Trachea: No tracheal deviation.   Cardiovascular:      Rate and Rhythm: Normal rate and regular rhythm.      Heart sounds: No murmur heard.     No gallop.   Pulmonary:      Effort: Pulmonary effort is normal.      Breath sounds: Normal breath sounds. No wheezing or rales.   Musculoskeletal:      Right lower leg: No edema.      Left lower leg: No edema.   Lymphadenopathy:      Cervical: No cervical adenopathy.   Skin:     Coloration: Skin is not cyanotic.      Findings: No rash.      Nails: There is no clubbing.   Neurological:      General: No focal deficit present.      Mental Status: He is alert and oriented to person, place, and time.   Psychiatric:         Mood and Affect: Mood normal.         Cognition and Memory: Cognition normal.       Administrative Statements

## 2024-06-07 NOTE — PATIENT INSTRUCTIONS
Medicare Preventive Visit Patient Instructions  Thank you for completing your Welcome to Medicare Visit or Medicare Annual Wellness Visit today. Your next wellness visit will be due in one year (6/8/2025).  The screening/preventive services that you may require over the next 5-10 years are detailed below. Some tests may not apply to you based off risk factors and/or age. Screening tests ordered at today's visit but not completed yet may show as past due. Also, please note that scanned in results may not display below.  Preventive Screenings:  Service Recommendations Previous Testing/Comments   Colorectal Cancer Screening  Colonoscopy    Fecal Occult Blood Test (FOBT)/Fecal Immunochemical Test (FIT)  Fecal DNA/Cologuard Test  Flexible Sigmoidoscopy Age: 45-75 years old   Colonoscopy: every 10 years (May be performed more frequently if at higher risk)  OR  FOBT/FIT: every 1 year  OR  Cologuard: every 3 years  OR  Sigmoidoscopy: every 5 years  Screening may be recommended earlier than age 45 if at higher risk for colorectal cancer. Also, an individualized decision between you and your healthcare provider will decide whether screening between the ages of 76-85 would be appropriate. Colonoscopy: 12/20/2021  FOBT/FIT: Not on file  Cologuard: Not on file  Sigmoidoscopy: Not on file          Prostate Cancer Screening Individualized decision between patient and health care provider in men between ages of 55-69   Medicare will cover every 12 months beginning on the day after your 50th birthday PSA: No results in last 5 years     Screening Not Indicated     Hepatitis C Screening Once for adults born between 1945 and 1965  More frequently in patients at high risk for Hepatitis C Hep C Antibody: Not on file        Diabetes Screening 1-2 times per year if you're at risk for diabetes or have pre-diabetes Fasting glucose: 108 mg/dL (1/26/2024)  A1C: 6.5 % (12/8/2023)  Screening Not Indicated  History Diabetes   Cholesterol  Screening Once every 5 years if you don't have a lipid disorder. May order more often based on risk factors. Lipid panel: 12/08/2023  Screening Not Indicated  History Lipid Disorder      Other Preventive Screenings Covered by Medicare:  Abdominal Aortic Aneurysm (AAA) Screening: covered once if your at risk. You're considered to be at risk if you have a family history of AAA or a male between the age of 65-75 who smoking at least 100 cigarettes in your lifetime.  Lung Cancer Screening: covers low dose CT scan once per year if you meet all of the following conditions: (1) Age 55-77; (2) No signs or symptoms of lung cancer; (3) Current smoker or have quit smoking within the last 15 years; (4) You have a tobacco smoking history of at least 20 pack years (packs per day x number of years you smoked); (5) You get a written order from a healthcare provider.  Glaucoma Screening: covered annually if you're considered high risk: (1) You have diabetes OR (2) Family history of glaucoma OR (3)  aged 50 and older OR (4)  American aged 65 and older  Osteoporosis Screening: covered every 2 years if you meet one of the following conditions: (1) Have a vertebral abnormality; (2) On glucocorticoid therapy for more than 3 months; (3) Have primary hyperparathyroidism; (4) On osteoporosis medications and need to assess response to drug therapy.  HIV Screening: covered annually if you're between the age of 15-65. Also covered annually if you are younger than 15 and older than 65 with risk factors for HIV infection. For pregnant patients, it is covered up to 3 times per pregnancy.    Immunizations:  Immunization Recommendations   Influenza Vaccine Annual influenza vaccination during flu season is recommended for all persons aged >= 6 months who do not have contraindications   Pneumococcal Vaccine   * Pneumococcal conjugate vaccine = PCV13 (Prevnar 13), PCV15 (Vaxneuvance), PCV20 (Prevnar 20)  * Pneumococcal  polysaccharide vaccine = PPSV23 (Pneumovax) Adults 19-65 yo with certain risk factors or if 65+ yo  If never received any pneumonia vaccine: recommend Prevnar 20 (PCV20)  Give PCV20 if previously received 1 dose of PCV13 or PPSV23   Hepatitis B Vaccine 3 dose series if at intermediate or high risk (ex: diabetes, end stage renal disease, liver disease)   Respiratory syncytial virus (RSV) Vaccine - COVERED BY MEDICARE PART D  * RSVPreF3 (Arexvy) CDC recommends that adults 60 years of age and older may receive a single dose of RSV vaccine using shared clinical decision-making (SCDM)   Tetanus (Td) Vaccine - COST NOT COVERED BY MEDICARE PART B Following completion of primary series, a booster dose should be given every 10 years to maintain immunity against tetanus. Td may also be given as tetanus wound prophylaxis.   Tdap Vaccine - COST NOT COVERED BY MEDICARE PART B Recommended at least once for all adults. For pregnant patients, recommended with each pregnancy.   Shingles Vaccine (Shingrix) - COST NOT COVERED BY MEDICARE PART B  2 shot series recommended in those 19 years and older who have or will have weakened immune systems or those 50 years and older     Health Maintenance Due:      Topic Date Due   • Hepatitis C Screening  Never done   • Lung Cancer Screening  Discontinued   • Colorectal Cancer Screening  Discontinued     Immunizations Due:      Topic Date Due   • COVID-19 Vaccine (3 - Pfizer risk series) 09/28/2021     Advance Directives   What are advance directives?  Advance directives are legal documents that state your wishes and plans for medical care. These plans are made ahead of time in case you lose your ability to make decisions for yourself. Advance directives can apply to any medical decision, such as the treatments you want, and if you want to donate organs.   What are the types of advance directives?  There are many types of advance directives, and each state has rules about how to use them. You  may choose a combination of any of the following:  Living will:  This is a written record of the treatment you want. You can also choose which treatments you do not want, which to limit, and which to stop at a certain time. This includes surgery, medicine, IV fluid, and tube feedings.   Durable power of  for healthcare (DPAHC):  This is a written record that states who you want to make healthcare choices for you when you are unable to make them for yourself. This person, called a proxy, is usually a family member or a friend. You may choose more than 1 proxy.  Do not resuscitate (DNR) order:  A DNR order is used in case your heart stops beating or you stop breathing. It is a request not to have certain forms of treatment, such as CPR. A DNR order may be included in other types of advance directives.  Medical directive:  This covers the care that you want if you are in a coma, near death, or unable to make decisions for yourself. You can list the treatments you want for each condition. Treatment may include pain medicine, surgery, blood transfusions, dialysis, IV or tube feedings, and a ventilator (breathing machine).  Values history:  This document has questions about your views, beliefs, and how you feel and think about life. This information can help others choose the care that you would choose.  Why are advance directives important?  An advance directive helps you control your care. Although spoken wishes may be used, it is better to have your wishes written down. Spoken wishes can be misunderstood, or not followed. Treatments may be given even if you do not want them. An advance directive may make it easier for your family to make difficult choices about your care.   Weight Management   Why it is important to manage your weight:  Being overweight increases your risk of health conditions such as heart disease, high blood pressure, type 2 diabetes, and certain types of cancer. It can also increase your  risk for osteoarthritis, sleep apnea, and other respiratory problems. Aim for a slow, steady weight loss. Even a small amount of weight loss can lower your risk of health problems.  How to lose weight safely:  A safe and healthy way to lose weight is to eat fewer calories and get regular exercise. You can lose up about 1 pound a week by decreasing the number of calories you eat by 500 calories each day.   Healthy meal plan for weight management:  A healthy meal plan includes a variety of foods, contains fewer calories, and helps you stay healthy. A healthy meal plan includes the following:  Eat whole-grain foods more often.  A healthy meal plan should contain fiber. Fiber is the part of grains, fruits, and vegetables that is not broken down by your body. Whole-grain foods are healthy and provide extra fiber in your diet. Some examples of whole-grain foods are whole-wheat breads and pastas, oatmeal, brown rice, and bulgur.  Eat a variety of vegetables every day.  Include dark, leafy greens such as spinach, kale, montserrat greens, and mustard greens. Eat yellow and orange vegetables such as carrots, sweet potatoes, and winter squash.   Eat a variety of fruits every day.  Choose fresh or canned fruit (canned in its own juice or light syrup) instead of juice. Fruit juice has very little or no fiber.  Eat low-fat dairy foods.  Drink fat-free (skim) milk or 1% milk. Eat fat-free yogurt and low-fat cottage cheese. Try low-fat cheeses such as mozzarella and other reduced-fat cheeses.  Choose meat and other protein foods that are low in fat.  Choose beans or other legumes such as split peas or lentils. Choose fish, skinless poultry (chicken or turkey), or lean cuts of red meat (beef or pork). Before you cook meat or poultry, cut off any visible fat.   Use less fat and oil.  Try baking foods instead of frying them. Add less fat, such as margarine, sour cream, regular salad dressing and mayonnaise to foods. Eat fewer high-fat  foods. Some examples of high-fat foods include french fries, doughnuts, ice cream, and cakes.  Eat fewer sweets.  Limit foods and drinks that are high in sugar. This includes candy, cookies, regular soda, and sweetened drinks.  Exercise:  Exercise at least 30 minutes per day on most days of the week. Some examples of exercise include walking, biking, dancing, and swimming. You can also fit in more physical activity by taking the stairs instead of the elevator or parking farther away from stores. Ask your healthcare provider about the best exercise plan for you.      © Copyright [employer] 2018 Information is for End User's use only and may not be sold, redistributed or otherwise used for commercial purposes. All illustrations and images included in CareNotes® are the copyrighted property of [employer].A[employer]M., Inc. or [employer]

## 2024-06-07 NOTE — TELEPHONE ENCOUNTER
Reason for call:   [x] Refill   [] Prior Auth  [] Other:     Office:   [x] PCP/Provider - SANA  [] Specialty/Provider -     METOPROLOL SUCCINATE  25 MG    SPIRONOLACTONE  25 MG    [facility] HOME DELIVERY    Does the patient have enough for 3 days?   [x] Yes   [] No - Send as HP to POD

## 2024-06-11 ENCOUNTER — TELEPHONE (OUTPATIENT)
Dept: FAMILY MEDICINE CLINIC | Facility: CLINIC | Age: 79
End: 2024-06-11

## 2024-06-11 NOTE — TELEPHONE ENCOUNTER
----- Message from Hill Davalos MD sent at 6/7/2024  9:54 AM EDT -----  Regarding: Care Gap  [facility] need report JJM

## 2024-06-13 RX ORDER — METOPROLOL SUCCINATE 25 MG/1
25 TABLET, EXTENDED RELEASE ORAL DAILY
Qty: 90 TABLET | Refills: 3 | Status: SHIPPED | OUTPATIENT
Start: 2024-06-13

## 2024-06-13 RX ORDER — SPIRONOLACTONE 25 MG/1
12.5 TABLET ORAL DAILY
Qty: 45 TABLET | Refills: 3 | Status: SHIPPED | OUTPATIENT
Start: 2024-06-13 | End: 2025-06-08

## 2024-06-13 NOTE — TELEPHONE ENCOUNTER
Patient called, asked patient if he is still taken the spironolactone 25 mg. Patient stated he is taken the medication. He is taken 1/2 tablet by mouth once daily.    He is taken the metoprolol succinate 25 mg.   He is taken medication 1 half a tablet once a day per bottle.      Sig take a tablet (25 mg total) by mouth daily.     Patient would like to know if he should be taken 1 half tablet of the metoprolol succinate.

## 2024-06-13 NOTE — TELEPHONE ENCOUNTER
Called patient no response left message to call the office back was calling to ask if patient is still taking Aldactone ( Spironolactone).    Please  message

## 2024-07-08 ENCOUNTER — HOSPITAL ENCOUNTER (OUTPATIENT)
Dept: RADIOLOGY | Facility: HOSPITAL | Age: 79
Discharge: HOME | End: 2024-07-08
Attending: RADIOLOGY
Payer: COMMERCIAL

## 2024-07-08 DIAGNOSIS — C34.12 MALIGNANT NEOPLASM OF UPPER LOBE OF LEFT LUNG (HCC): ICD-10-CM

## 2024-07-08 PROCEDURE — G1004 CDSM NDSC: HCPCS

## 2024-07-08 PROCEDURE — 71250 CT THORAX DX C-: CPT

## 2024-07-11 ENCOUNTER — OFFICE VISIT (OUTPATIENT)
Dept: RADIATION ONCOLOGY | Facility: CLINIC | Age: 79
End: 2024-07-11
Attending: RADIOLOGY
Payer: COMMERCIAL

## 2024-07-11 VITALS
TEMPERATURE: 97.6 F | DIASTOLIC BLOOD PRESSURE: 77 MMHG | WEIGHT: 176.6 LBS | HEART RATE: 77 BPM | BODY MASS INDEX: 26.76 KG/M2 | RESPIRATION RATE: 16 BRPM | OXYGEN SATURATION: 98 % | SYSTOLIC BLOOD PRESSURE: 115 MMHG | HEIGHT: 68 IN

## 2024-07-11 DIAGNOSIS — C34.12 MALIGNANT NEOPLASM OF UPPER LOBE OF LEFT LUNG (HCC): Primary | ICD-10-CM

## 2024-07-11 PROCEDURE — 99211 OFF/OP EST MAY X REQ PHY/QHP: CPT | Performed by: RADIOLOGY

## 2024-07-11 PROCEDURE — G0463 HOSPITAL OUTPT CLINIC VISIT: HCPCS | Performed by: RADIOLOGY

## 2024-07-11 PROCEDURE — 99213 OFFICE O/P EST LOW 20 MIN: CPT | Performed by: RADIOLOGY

## 2024-07-11 NOTE — PROGRESS NOTES
Magdaleno Tinoco 1945 is a 78 y.o. male With h/o malignant neoplasm of DEEPALI,stage IA3. He completed SBRT to the DEEPALI on 3/3/23. He was last seen in Radiation oncology  on 1/18/24. He returns today for a follow up visit.      3/27/24 CTA   Impression:   1. No pulmonary embolus identified as clinically questioned. There is some   patchy left upper lobe airspace opacity with air bronchograms most compatible   with infiltrate. There is some asymmetric pleural thickening in the superior   left hemithorax anterolaterally. This should be followed to radiographic and/or   CT resolution following appropriate therapy. No measurable mass identified. The   remainder of the lungs are clear. No overt failure.   2. Other findings as above.     Additionally, MIP and/or volumetric reformations were also performed. The   study   is as otherwise previously reported     5/17/24 XR Chest PA & Lateral  Heart and vessels normal. Lung fields remain well aerated. No interval   infiltrates, edema, or effusions. Hilar and mediastinal compartments are without   adenopathy. Left-sided transvenous pacer wire unchanged in position with no   discontinuities within its course.     7/8/24 CT chest  IMPRESSION:  Stable posttreatment changes of left upper lobe without recurrent disease.       Upcoming  7/25/24 [facility]         Follow up visit     Oncology History Overview Note   With h/o malignant neoplasm of DEEPALI,stage IA3. He completed SBRT to the DEEPALI on 3/3/23. He was last seen in Radiation oncology  on 1/18/24. He returns today for a follow up visit.      3/27/24 CTA   Impression:   1. No pulmonary embolus identified as clinically questioned. There is some   patchy left upper lobe airspace opacity with air bronchograms most compatible   with infiltrate. There is some asymmetric pleural thickening in the superior   left hemithorax anterolaterally. This should be followed to radiographic and/or   CT resolution following appropriate therapy. No  measurable mass identified. The   remainder of the lungs are clear. No overt failure.   2. Other findings as above.     Additionally, MIP and/or volumetric reformations were also performed. The   study   is as otherwise previously reported     5/17/24 XR Chest PA & Lateral  Heart and vessels normal. Lung fields remain well aerated. No interval   infiltrates, edema, or effusions. Hilar and mediastinal compartments are without   adenopathy. Left-sided transvenous pacer wire unchanged in position with no   discontinuities within its course.     Upcoming  7/25/24 [facility] Cardiology          Malignant neoplasm of upper lobe of left lung (HCC)   12/19/2022 Biopsy    A.  Lung, left upper lobe nodule, biopsy:     - Adenocarcinoma compatible with a lung primary.     12/28/2022 Initial Diagnosis    Malignant neoplasm of upper lobe of left lung (HCC)     1/24/2023 -  Cancer Staged    Staging form: Lung, AJCC 8th Edition  - Clinical stage from 1/24/2023: Stage IA3 (cT1c, cN0, cM0) - Signed by Anthony Gorman MD on 1/24/2023  Histopathologic type: Adenocarcinoma, NOS  Stage prefix: Initial diagnosis  Laterality: Left  Tumor size (mm): 23  Stage used in treatment planning: Yes  National guidelines used in treatment planning: Yes       2/21/2023 - 3/3/2023 Radiation    Course: C1 SBRT    Plan ID Energy Fractions Dose per Fraction (cGy) Dose Correction (cGy) Total Dose Delivered (cGy) Elapsed Days   SBRT DEEPALI 6X 5 / 5 1,000 0 5,000 10      Treatment dates:  C1 SBRT: 2/21/2023 - 3/3/2023          Surgery           Review of Systems:  Review of Systems   Constitutional:  Positive for appetite change (occasional) and fatigue. Negative for activity change.   HENT:  Positive for hearing loss. Negative for trouble swallowing.    Eyes: Negative.         Wears glasses   Respiratory:  Positive for cough and shortness of breath (with activity).    Cardiovascular: Negative.    Gastrointestinal:  Positive for abdominal pain. Negative for  constipation, diarrhea, nausea and vomiting.   Endocrine: Negative.    Genitourinary: Negative.    Musculoskeletal: Negative.    Skin: Negative.    Allergic/Immunologic: Negative.    Neurological:  Positive for dizziness (changing positions).   Hematological: Negative.    Psychiatric/Behavioral:  Negative for sleep disturbance.        Clinical Trial: no          Teaching completed    Health Maintenance   Topic Date Due    DM Eye Exam  Never done    Kidney Health Evaluation: Albumin/Creatinine Ratio  03/13/2024    BMI: Followup Plan  06/06/2024    HEMOGLOBIN A1C  06/08/2024    Influenza Vaccine (1) 09/01/2024    COVID-19 Vaccine (3 - Pfizer risk series) 09/07/2024 (Originally 9/28/2021)    Hepatitis A Vaccine (1 of 2 - Risk 2-dose series) 12/07/2024 (Originally 8/6/1964)    Hepatitis C Screening  06/07/2025 (Originally 1945)    RSV Vaccine Age 60+ Years (1 - 1-dose 60+ series) 06/07/2025 (Originally 8/6/2007)    Zoster Vaccine (1 of 2) 06/07/2025 (Originally 8/6/1964)    Diabetic Foot Exam  12/07/2024    Kidney Health Evaluation: GFR  03/27/2025    Fall Risk  06/07/2025    Depression Screening  06/07/2025    Medicare Annual Wellness Visit (AWV)  06/07/2025    BMI: Adult  06/07/2025    Pneumococcal Vaccine: 65+ Years  Completed    RSV Vaccine age 0-20 Months  Aged Out    HIB Vaccine  Aged Out    IPV Vaccine  Aged Out    Meningococcal ACWY Vaccine  Aged Out    HPV Vaccine  Aged Out    Lung Cancer Screening  Discontinued    Colorectal Cancer Screening  Discontinued     Patient Active Problem List   Diagnosis    Cardiomyopathy (HCC)    CHF (congestive heart failure), NYHA class I, chronic, systolic (HCC)    COPD (chronic obstructive pulmonary disease) (HCC)    Hyperlipidemia    Hypertension    Old myocardial infarction    Paroxysmal supraventricular tachycardia    Hyperuricemia    Lumbar degenerative disc disease    Type 2 diabetes mellitus with microalbuminuria, without long-term current use of insulin (HCC)     "Coronary artery disease involving native coronary artery of native heart without angina pectoris    Bilateral renal cysts    Superior mesenteric artery stenosis (HCC)    Celiac artery stenosis (HCC)    Automatic implantable cardiac defibrillator in situ    IVCD (intraventricular conduction defect)    Cigarette nicotine dependence in remission    Aneurysm of ascending aorta without rupture (HCC)    Malignant neoplasm of upper lobe of left lung (HCC)    Stage 3a chronic kidney disease (HCC)     Past Medical History:   Diagnosis Date    Angina pectoris (HCC)     \"in the past\"    Basal cell carcinoma     Resolved 8/25/2017     Colitis 11/22/2017    COPD (chronic obstructive pulmonary disease) (HCC)     Diabetes mellitus (HCC)     NIDDM    Hyperlipidemia     Hypertension     Lung cancer (HCC)     Malignant melanoma (HCC) 02/24/2020     Past Surgical History:   Procedure Laterality Date    CARDIAC SURGERY  2014    cardiac cath    COLONOSCOPY      HAND SURGERY Left     IR BIOPSY LUNG  12/19/2022    KNEE ARTHROSCOPY Right     NECK SURGERY      bone graft and plates    ME EXCISION MALIGNANT LESION F/E/E/N/L >4.0 CM N/A 04/29/2016    Procedure: LEFT CHEEK BCC EXCISION; FROZEN SECTION; BILATERAL LOWER EYELIDS SUSPICIOUS LESION EXCISION; LEFT CHEEK EXTRACTION OF COMEDONES X 2;  Surgeon: Declan Paulino MD;  Location: [facility];  Service: Plastics    ME EXCISION MALIGNANT LESION TRUNK/ARM/LEG > 4.0 CM Left 05/30/2019    Procedure: EXCISION WIDE LESION TRUNK/ABDOMEN/BACK;  Surgeon: Ramon Ayala MD;  Location: [facility];  Service: General     Family History   Problem Relation Age of Onset    Stomach cancer Mother     Colon cancer Sister      Social History     Socioeconomic History    Marital status: /Civil Union     Spouse name: Not on file    Number of children: Not on file    Years of education: Not on file    Highest education level: Not on file   Occupational History    Not on file   Tobacco Use    Smoking status: Former "     Current packs/day: 0.00     Average packs/day: 2.0 packs/day for 45.8 years (91.5 ttl pk-yrs)     Types: Cigarettes     Start date:      Quit date: 10/4/2017     Years since quittin.7    Smokeless tobacco: Never   Vaping Use    Vaping status: Never Used   Substance and Sexual Activity    Alcohol use: Not Currently     Alcohol/week: 1.0 standard drink of alcohol     Types: 1 Cans of beer per week     Comment: 1 beer a week    Drug use: Never    Sexual activity: Not Currently   Other Topics Concern    Not on file   Social History Narrative    Former smoker - As per [employer]      Social Determinants of Health     Financial Resource Strain: Low Risk  (2023)    Overall Financial Resource Strain (CARDIA)     Difficulty of Paying Living Expenses: Not hard at all   Food Insecurity: No Food Insecurity (2024)    Hunger Vital Sign     Worried About Running Out of Food in the Last Year: Never true     Ran Out of Food in the Last Year: Never true   Transportation Needs: No Transportation Needs (2024)    PRAPARE - Transportation     Lack of Transportation (Medical): No     Lack of Transportation (Non-Medical): No   Physical Activity: Not on file   Stress: Not on file   Social Connections: Not on file   Intimate Partner Violence: Not on file   Housing Stability: Low Risk  (2024)    Housing Stability Vital Sign     Unable to Pay for Housing in the Last Year: No     Number of Times Moved in the Last Year: 0     Homeless in the Last Year: No       Current Outpatient Medications:     aspirin 81 MG tablet, Take 81 mg by mouth daily., Disp: , Rfl:     Fluticasone Furoate-Vilanterol (Breo Ellipta) 100-25 mcg/actuation inhaler, Inhale 1 puff daily, Disp: 180 each, Rfl: 3    metFORMIN (GLUCOPHAGE) 1000 MG tablet, TAKE 1 TABLET BY MOUTH TWICE  DAILY WITH MEALS, Disp: 180 tablet, Rfl: 3    metoprolol succinate (TOPROL-XL) 25 mg 24 hr tablet, Take 1 tablet (25 mg total) by mouth daily, Disp: 90 tablet, Rfl: 3     nitroglycerin (NITROSTAT) 0.4 mg SL tablet, , Disp: , Rfl:     sacubitril-valsartan (Entresto) 49-51 MG TABS, Take 1 tablet by mouth 2 (two) times a day, Disp: , Rfl:     simvastatin (ZOCOR) 40 mg tablet, Take 1 tablet (40 mg total) by mouth daily at bedtime for 90 days, Disp: 90 tablet, Rfl: 3    spironolactone (ALDACTONE) 25 mg tablet, Take 0.5 tablets (12.5 mg total) by mouth daily, Disp: 45 tablet, Rfl: 3  No Known Allergies  There were no vitals filed for this visit.

## 2024-07-11 NOTE — PROGRESS NOTES
Follow-up - Radiation Oncology   Magdaleno Tinoco 1945 78 y.o. male [id number]      History of Present Illness   Cancer Staging   Malignant neoplasm of upper lobe of left lung (HCC)  Staging form: Lung, AJCC 8th Edition  - Clinical stage from 1/24/2023: Stage IA3 (cT1c, cN0, cM0) - Signed by Anthony Gorman MD on 1/24/2023  Histopathologic type: Adenocarcinoma, NOS  Stage prefix: Initial diagnosis  Laterality: Left  Tumor size (mm): 23  Stage used in treatment planning: Yes  National guidelines used in treatment planning: Yes      Magdaleno Tinoco is a 78 y.o. year old male with a history of malignant neoplasm of DEEPALI,stage IA3. He completed SBRT to the DEEPALI on 3/3/23. He was last seen in Radiation oncology on 1/18/24. He returns today for a follow up visit.     Interval History:  3/27/24 CTA   Impression:   1. No pulmonary embolus identified as clinically questioned. There is some   patchy left upper lobe airspace opacity with air bronchograms most compatible   with infiltrate. There is some asymmetric pleural thickening in the superior   left hemithorax anterolaterally. This should be followed to radiographic and/or   CT resolution following appropriate therapy. No measurable mass identified. The   remainder of the lungs are clear. No overt failure.   2. Other findings as above.      Additionally, MIP and/or volumetric reformations were also performed. The   study   is as otherwise previously reported      5/17/24 XR Chest PA & Lateral  Heart and vessels normal. Lung fields remain well aerated. No interval   infiltrates, edema, or effusions. Hilar and mediastinal compartments are without   adenopathy. Left-sided transvenous pacer wire unchanged in position with no   discontinuities within its course.      7/8/24 CT chest  IMPRESSION:  Stable posttreatment changes of left upper lobe without recurrent disease.     He is seen for follow-up today with his daughter.  He reports he has stable shortness of  "breath on exertion and none at rest. He also has a stable mild cough and often clears his throat. He has no sputum production.  He denies any hemoptysis.  He has a good appetite with no dysphagia.  He does not smoke any tobacco since quitting 6 years ago in 2017.  He did smoke for 50 years.  He has a previous history of skin cancers with melanoma and basal cell carcinomas that have been excised from the left cheek and left back which have not recurred.  He denies any new or suspicious skin lesions.     Upcoming  7/25/24 [facility]  12/17/24 Dr. Davalos     Historical Information   Oncology History   Malignant neoplasm of upper lobe of left lung (HCC)   12/19/2022 Biopsy    A.  Lung, left upper lobe nodule, biopsy:     - Adenocarcinoma compatible with a lung primary.     12/28/2022 Initial Diagnosis    Malignant neoplasm of upper lobe of left lung (HCC)     1/24/2023 -  Cancer Staged    Staging form: Lung, AJCC 8th Edition  - Clinical stage from 1/24/2023: Stage IA3 (cT1c, cN0, cM0) - Signed by Anthony Gorman MD on 1/24/2023  Histopathologic type: Adenocarcinoma, NOS  Stage prefix: Initial diagnosis  Laterality: Left  Tumor size (mm): 23  Stage used in treatment planning: Yes  National guidelines used in treatment planning: Yes       2/21/2023 - 3/3/2023 Radiation    Course: C1 SBRT    Plan ID Energy Fractions Dose per Fraction (cGy) Dose Correction (cGy) Total Dose Delivered (cGy) Elapsed Days   SBRT DEEPALI 6X 5 / 5 1,000 0 5,000 10      Treatment dates:  C1 SBRT: 2/21/2023 - 3/3/2023          Surgery           Past Medical History:   Diagnosis Date    Angina pectoris (HCC)     \"in the past\"    Basal cell carcinoma     Resolved 8/25/2017     Colitis 11/22/2017    COPD (chronic obstructive pulmonary disease) (HCC)     Diabetes mellitus (HCC)     NIDDM    Hyperlipidemia     Hypertension     Lung cancer (HCC)     Malignant melanoma (HCC) 02/24/2020     Past Surgical History:   Procedure Laterality Date    " CARDIAC SURGERY      cardiac cath    COLONOSCOPY      HAND SURGERY Left     IR BIOPSY LUNG  2022    KNEE ARTHROSCOPY Right     NECK SURGERY      bone graft and plates    HI EXCISION MALIGNANT LESION F/E/E/N/L >4.0 CM N/A 2016    Procedure: LEFT CHEEK BCC EXCISION; FROZEN SECTION; BILATERAL LOWER EYELIDS SUSPICIOUS LESION EXCISION; LEFT CHEEK EXTRACTION OF COMEDONES X 2;  Surgeon: Declan Paulino MD;  Location: [facility];  Service: Plastics    HI EXCISION MALIGNANT LESION TRUNK/ARM/LEG > 4.0 CM Left 2019    Procedure: EXCISION WIDE LESION TRUNK/ABDOMEN/BACK;  Surgeon: Ramon Ayala MD;  Location: [facility];  Service: General     Social History   Social History     Substance and Sexual Activity   Alcohol Use Not Currently    Alcohol/week: 1.0 standard drink of alcohol    Types: 1 Cans of beer per week    Comment: 1 beer a week     Social History     Substance and Sexual Activity   Drug Use Never     Social History     Tobacco Use   Smoking Status Former    Current packs/day: 0.00    Average packs/day: 2.0 packs/day for 45.8 years (91.5 ttl pk-yrs)    Types: Cigarettes    Start date:     Quit date: 10/4/2017    Years since quittin.7   Smokeless Tobacco Never     Meds/Allergies     Current Outpatient Medications:     aspirin 81 MG tablet, Take 81 mg by mouth daily., Disp: , Rfl:     Fluticasone Furoate-Vilanterol (Breo Ellipta) 100-25 mcg/actuation inhaler, Inhale 1 puff daily, Disp: 180 each, Rfl: 3    metFORMIN (GLUCOPHAGE) 1000 MG tablet, TAKE 1 TABLET BY MOUTH TWICE  DAILY WITH MEALS, Disp: 180 tablet, Rfl: 3    metoprolol succinate (TOPROL-XL) 25 mg 24 hr tablet, Take 1 tablet (25 mg total) by mouth daily, Disp: 90 tablet, Rfl: 3    sacubitril-valsartan (Entresto) 49-51 MG TABS, Take 1 tablet by mouth 2 (two) times a day, Disp: , Rfl:     simvastatin (ZOCOR) 40 mg tablet, Take 1 tablet (40 mg total) by mouth daily at bedtime for 90 days, Disp: 90 tablet, Rfl: 3    spironolactone  "(ALDACTONE) 25 mg tablet, Take 0.5 tablets (12.5 mg total) by mouth daily, Disp: 45 tablet, Rfl: 3    nitroglycerin (NITROSTAT) 0.4 mg SL tablet, , Disp: , Rfl:   No Known Allergies    Review of Systems  Constitutional:  Positive for appetite change (occasional) and fatigue. Negative for activity change.   HENT:  Positive for hearing loss. Negative for trouble swallowing.    Eyes: Negative.         Wears glasses   Respiratory:  Positive for cough and shortness of breath (with activity).    Cardiovascular: Negative.    Gastrointestinal:  Positive for abdominal pain. Negative for constipation, diarrhea, nausea and vomiting.   Endocrine: Negative.    Genitourinary: Negative.    Musculoskeletal: Negative.    Skin: Negative.    Allergic/Immunologic: Negative.    Neurological:  Positive for dizziness (changing positions).   Hematological: Negative.    Psychiatric/Behavioral:  Negative for sleep disturbance.       OBJECTIVE:   /77 (BP Location: Left arm, Patient Position: Sitting)   Pulse 77   Temp 97.6 °F (36.4 °C) (Temporal)   Resp 16   Ht 5' 8\" (1.727 m)   Wt 80.1 kg (176 lb 9.6 oz)   SpO2 98%   BMI 26.85 kg/m²   Pain Assessment:  0  ECOG/Zubrod/WHO: 1 - Symptomatic but completely ambulatory    Physical Exam  Vitals and nursing note reviewed.   Constitutional:       General: He is not in acute distress.     Appearance: He is well-developed. He is not diaphoretic.   HENT:      Head: Normocephalic and atraumatic.      Mouth/Throat:      Pharynx: No oropharyngeal exudate.   Eyes:      General: No scleral icterus.     Conjunctiva/sclera: Conjunctivae normal.      Pupils: Pupils are equal, round, and reactive to light.   Neck:      Thyroid: No thyromegaly.      Trachea: No tracheal deviation.   Cardiovascular:      Rate and Rhythm: Normal rate and regular rhythm.      Heart sounds: Normal heart sounds.   Pulmonary:      Effort: Pulmonary effort is normal. No respiratory distress.      Breath sounds: Normal " breath sounds. No stridor. No wheezing, rhonchi or rales.   Chest:      Chest wall: No tenderness.   Abdominal:      General: Bowel sounds are normal. There is no distension.      Palpations: Abdomen is soft. There is no mass.      Tenderness: There is no abdominal tenderness.   Musculoskeletal:         General: No swelling or tenderness. Normal range of motion.      Cervical back: Normal range of motion and neck supple.   Lymphadenopathy:      Cervical: No cervical adenopathy.      Upper Body:      Right upper body: No supraclavicular adenopathy.      Left upper body: No supraclavicular adenopathy.   Skin:     General: Skin is warm and dry.      Coloration: Skin is not jaundiced or pale.      Findings: No erythema or rash.   Neurological:      General: No focal deficit present.      Mental Status: He is alert and oriented to person, place, and time.      Cranial Nerves: No cranial nerve deficit.      Coordination: Coordination normal.   Psychiatric:         Mood and Affect: Mood normal.         Behavior: Behavior normal.         Thought Content: Thought content normal.         Judgment: Judgment normal.     RESULTS    Lab Results: No results found for this or any previous visit (from the past 672 hour(s)).    Imaging Studies:CT chest wo contrast    Result Date: 7/11/2024  Narrative: CT CHEST WITHOUT IV CONTRAST INDICATION: C34.12: Malignant neoplasm of upper lobe, left bronchus or lung. COMPARISON: CT chest 1/11/2024, 9/20/2022 TECHNIQUE: CT examination of the chest was performed without intravenous contrast. Multiplanar 2D reformatted images were created from the source data. This examination, like all CT scans performed in the [facility], was performed utilizing techniques to minimize radiation dose exposure, including the use of iterative reconstruction and automated exposure control. Radiation dose length product (DLP) for this visit: 316.49 mGy-cm FINDINGS: LUNGS: Stable linear opacity of left  upper lobe with traction bronchiectasis and volume loss. Stable 3 mm right lower lobe nodule (series 303 image 160). Emphysematous changes of lungs. There is no tracheal or endobronchial lesion. PLEURA: Stable mild pleural thickening along the anterior left upper lobe (series 301 image 43). HEART/GREAT VESSELS: ICD lead terminates in the right ventricle. No thoracic aortic aneurysm. MEDIASTINUM AND DELANO: Unremarkable. CHEST WALL AND LOWER NECK: ICD generator present in the left anterior chest wall. VISUALIZED STRUCTURES IN THE UPPER ABDOMEN: Unremarkable. OSSEOUS STRUCTURES: No acute fracture or destructive osseous lesion. Cervical spine fusion hardware is partially visualized.     Impression: Stable posttreatment changes of left upper lobe without recurrent disease. Workstation performed: [id number]     Assessment/Plan:  Orders Placed This Encounter   Procedures    CT chest wo contrast        Magdaleno Tinoco is a 78 y.o. 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  He had a CAT scan of the chest without contrast on September 20, 2022 that revealed a 2 cm spiculated cavitary nodule in the left upper lobe.  He had a PET/CT study November 1, 2022 that confirmed a 2.3 cm hypermetabolic spiculated left upper lobe nodule that could represent malignancy.  There was no evidence of any mediastinal lymphadenopathy.  Left upper lobe lung biopsy December 19, 2022 confirmed adenocarcinoma compatible with lung primary.  He was seen by Dr. Baxter from thoracic surgery on December 28, 2022 and offered surgical resection versus radiation therapy using stereotactic body radiotherapy.  He was reviewed on January 9, 2023 at thoracic oncology multidisciplinary case review.  Recommendations were made for cardiopulmonary exercise stress test that was scheduled for January 13, 2023 and then follow-up with Dr. Baxter to discuss potential surgery.  He notified thoracic surgery on January  17, 2022 he did not wish to pursue surgical resection. He was seen for consultation to pursue definitive radiation therapy using stereotactic body radiotherapy.  We discussed that this can be just as effective as surgery and avoid the risks of surgery. We reviewed his imaging studies including CAT scan of the chest and PET/CT with the patient, his wife and daughter. His left upper lobe lung primary was below the region of his ICD located in the left upper chest.  He  received stereotactic body radiotherapy without any dose to his ICD.  He completed treatments in the [facility] on March 3, 2023.     He is seen for follow-up today.  He is doing well from a respiratory standpoint.  He had a repeat CT of the chest without contrast September 13, 2023 that reveals some posttreatment changes with decrease in central density indicating further response to treatment which is in a background of radiation changes.  There were some stable tiny left lower lobe nodules and no new nodules.  His chest CT January 11, 2024 revealed no evidence of recurrent disease and no new malignancy in the chest.  There was some mild improvement of the left upper lobe posttreatment changes.  He had a CTA study March 27, 2024 that revealed no pulmonary embolus.  There was some posttreatment changes noted in the left upper lobe region.  His most recent chest CT July 8, 2024 revealed stable posttreatment changes in the left upper lobe without any recurrent disease and there were no other suspicious nor new nodules.  He is doing well and remains without any evidence of disease.  CT results were discussed with him and his daughter today.  He will return in 6 months for follow-up with a repeat chest CT without contrast.  He also has follow-up with Dr. Davalos on December 17, 2024.       Anthony Gorman MD  7/11/2024,9:48 AM    Portions of the record may have been created with voice recognition software.  Occasional  "wrong word or \"sound a like\" substitutions may have occurred due to the inherent limitations of voice recognition software.  Read the chart carefully and recognize, using context, where substitutions have occurred.        "

## 2024-07-29 LAB
ALBUMIN/CREAT UR: 300.5
CHOLEST SERPL-MCNC: 145 MG/DL
CHOLEST/HDLC SERPL: 3.7 {RATIO}
CREAT UR-MCNC: 160.4 MG/DL (ref 50–200)
EST. AVERAGE GLUCOSE BLD GHB EST-MCNC: 154 MG/DL
HBA1C MFR BLD: 7 %
HDLC SERPL-MCNC: 39 MG/DL (ref 23–92)
LDLC SERPL CALC-MCNC: 70 MG/DL
MICROALBUMIN UR-MCNC: 48.2 MG/DL
NONHDLC SERPL-MCNC: 106 MG/DL
TRIGL SERPL-MCNC: 179 MG/DL

## 2024-08-02 ENCOUNTER — TELEPHONE (OUTPATIENT)
Dept: FAMILY MEDICINE CLINIC | Facility: CLINIC | Age: 79
End: 2024-08-02

## 2024-08-02 NOTE — TELEPHONE ENCOUNTER
Call re labs A1c 7.0 indicating diabetes is well-controlled.  There is a small amount of protein in the urine related to diabetes.  Treatment good diabetes control.  He is on a medication called valsartan.  This has a protective effect on the kidney.  Cholesterol 145 normal less than 200.  Mildly elevated triglycerides at 179 normal less than 150.  Watch diet such as cakes, cookies, baked goods, ice cream and  fried food/fast food.    Recent Results (from the past 672 hour(s))   Lipid panel    Collection Time: 07/29/24  6:57 AM   Result Value Ref Range    Cholesterol, Total 145 <200 mg/dL    Triglycerides 179 (H) <150 mg/dL    HDL Cholesterol 39 23 - 92 mg/dL    Non HDL Chol. (LDL+VLDL) 106 <160 mg/dL    LDL Calculated 70 <130 mg/dL    Chol HDLC Ratio 3.7    Hemoglobin A1C    Collection Time: 07/29/24  6:57 AM   Result Value Ref Range    Hemoglobin A1C 7.0 (H) <5.7 %    Estimated Average Glucose 154 mg/dL   Albumin / creatinine urine ratio    Collection Time: 07/29/24  6:57 AM   Result Value Ref Range    Creatinine(Crt),U 160.4 50.0 - 200.0 mg/dL    Albumin,U,Random 48.2 (H) <3.0 mg/dL    Microalb/Creat Ratio 300.5 (H) <30.0 mg/gm CREA       Current Outpatient Medications:     aspirin 81 MG tablet, Take 81 mg by mouth daily., Disp: , Rfl:     Fluticasone Furoate-Vilanterol (Breo Ellipta) 100-25 mcg/actuation inhaler, Inhale 1 puff daily, Disp: 180 each, Rfl: 3    metFORMIN (GLUCOPHAGE) 1000 MG tablet, TAKE 1 TABLET BY MOUTH TWICE  DAILY WITH MEALS, Disp: 180 tablet, Rfl: 3    metoprolol succinate (TOPROL-XL) 25 mg 24 hr tablet, Take 1 tablet (25 mg total) by mouth daily, Disp: 90 tablet, Rfl: 3    nitroglycerin (NITROSTAT) 0.4 mg SL tablet, , Disp: , Rfl:     sacubitril-valsartan (Entresto) 49-51 MG TABS, Take 1 tablet by mouth 2 (two) times a day, Disp: , Rfl:     simvastatin (ZOCOR) 40 mg tablet, Take 1 tablet (40 mg total) by mouth daily at bedtime for 90 days, Disp: 90 tablet, Rfl: 3    spironolactone  (ALDACTONE) 25 mg tablet, Take 0.5 tablets (12.5 mg total) by mouth daily, Disp: 45 tablet, Rfl: 3

## 2024-08-06 NOTE — TELEPHONE ENCOUNTER
Patient called to go over lab results again and inquire what the foods to avoid. RN reviewed with info, patient stated he will continue to eat what he wants. RN advised and provided education on high cholesterol and side effect when uncontrolled. Patient will monitor and follow up.

## 2024-08-19 ENCOUNTER — VBI (OUTPATIENT)
Dept: ADMINISTRATIVE | Facility: OTHER | Age: 79
End: 2024-08-19

## 2024-08-19 NOTE — TELEPHONE ENCOUNTER
08/19/24 2:15 PM     Chart reviewed for Microalbumin Creatinine Urine Ratio OR Albumin Creatinine Urine Ratio  was/were submitted to the patient's insurance.   R  Melisa Joel   [facility]

## 2024-09-21 DIAGNOSIS — R80.9 TYPE 2 DIABETES MELLITUS WITH MICROALBUMINURIA, WITHOUT LONG-TERM CURRENT USE OF INSULIN (HCC): ICD-10-CM

## 2024-09-21 DIAGNOSIS — E11.29 TYPE 2 DIABETES MELLITUS WITH MICROALBUMINURIA, WITHOUT LONG-TERM CURRENT USE OF INSULIN (HCC): ICD-10-CM

## 2024-12-10 ENCOUNTER — RA CDI HCC (OUTPATIENT)
Dept: OTHER | Facility: HOSPITAL | Age: 79
End: 2024-12-10

## 2024-12-17 ENCOUNTER — OFFICE VISIT (OUTPATIENT)
Dept: FAMILY MEDICINE CLINIC | Facility: CLINIC | Age: 79
End: 2024-12-17
Payer: COMMERCIAL

## 2024-12-17 VITALS
HEART RATE: 71 BPM | DIASTOLIC BLOOD PRESSURE: 78 MMHG | OXYGEN SATURATION: 100 % | BODY MASS INDEX: 26.67 KG/M2 | SYSTOLIC BLOOD PRESSURE: 114 MMHG | HEIGHT: 68 IN | RESPIRATION RATE: 18 BRPM | WEIGHT: 176 LBS | TEMPERATURE: 97.4 F

## 2024-12-17 DIAGNOSIS — I50.22 CHF (CONGESTIVE HEART FAILURE), NYHA CLASS I, CHRONIC, SYSTOLIC (HCC): ICD-10-CM

## 2024-12-17 DIAGNOSIS — E11.29 TYPE 2 DIABETES MELLITUS WITH MICROALBUMINURIA, WITHOUT LONG-TERM CURRENT USE OF INSULIN (HCC): ICD-10-CM

## 2024-12-17 DIAGNOSIS — K55.1 SUPERIOR MESENTERIC ARTERY STENOSIS (HCC): ICD-10-CM

## 2024-12-17 DIAGNOSIS — J44.9 CHRONIC OBSTRUCTIVE PULMONARY DISEASE, UNSPECIFIED COPD TYPE (HCC): ICD-10-CM

## 2024-12-17 DIAGNOSIS — N18.31 STAGE 3A CHRONIC KIDNEY DISEASE (HCC): ICD-10-CM

## 2024-12-17 DIAGNOSIS — I77.4 CELIAC ARTERY STENOSIS (HCC): ICD-10-CM

## 2024-12-17 DIAGNOSIS — C34.12 MALIGNANT NEOPLASM OF UPPER LOBE OF LEFT LUNG (HCC): ICD-10-CM

## 2024-12-17 DIAGNOSIS — E78.2 MIXED HYPERLIPIDEMIA: ICD-10-CM

## 2024-12-17 DIAGNOSIS — R80.9 TYPE 2 DIABETES MELLITUS WITH MICROALBUMINURIA, WITHOUT LONG-TERM CURRENT USE OF INSULIN (HCC): ICD-10-CM

## 2024-12-17 DIAGNOSIS — I47.10 PAROXYSMAL SUPRAVENTRICULAR TACHYCARDIA (HCC): ICD-10-CM

## 2024-12-17 DIAGNOSIS — I10 PRIMARY HYPERTENSION: Primary | ICD-10-CM

## 2024-12-17 PROBLEM — I71.21 ANEURYSM OF ASCENDING AORTA WITHOUT RUPTURE (HCC): Status: RESOLVED | Noted: 2022-12-06 | Resolved: 2024-12-17

## 2024-12-17 PROBLEM — I25.10 CORONARY ARTERY DISEASE INVOLVING NATIVE CORONARY ARTERY OF NATIVE HEART WITHOUT ANGINA PECTORIS: Status: RESOLVED | Noted: 2021-05-06 | Resolved: 2024-12-17

## 2024-12-17 LAB — SL AMB POCT HEMOGLOBIN AIC: 6.7 (ref ?–6.5)

## 2024-12-17 PROCEDURE — 83036 HEMOGLOBIN GLYCOSYLATED A1C: CPT | Performed by: FAMILY MEDICINE

## 2024-12-17 PROCEDURE — 99214 OFFICE O/P EST MOD 30 MIN: CPT | Performed by: FAMILY MEDICINE

## 2024-12-17 NOTE — ASSESSMENT & PLAN NOTE
Lab Results   Component Value Date    EGFR 60 09/09/2024    EGFR 51 (L) 08/27/2024    EGFR 40 (L) 08/12/2024    CREATININE 1.23 09/09/2024    CREATININE 1.41 (H) 08/27/2024    CREATININE 1.72 (H) 08/12/2024

## 2024-12-17 NOTE — ASSESSMENT & PLAN NOTE
Ex-smoker. COPD on Breo daily.  Exertional dyspnea no changes.      07/2024 CT scan chest Stable linear opacity of left upper lobe with traction bronchiectasis and volume loss. Stable 3 mm right lower lobe nodule Emphysematous changes of lungs. There is no tracheal or endobronchial lesion: Stable mild pleural thickening along the anterior left upper lobe

## 2024-12-17 NOTE — ASSESSMENT & PLAN NOTE
07/2021 nuclear stress test consistent with severe nonischemic cardiomyopathy with severely dilated left ventricle.  EF 27%.  Severe global hypokinesis.  No reversible defects to suggest ischemia

## 2024-12-17 NOTE — ASSESSMENT & PLAN NOTE
Hyperlipidemia on Simvastatin 40 mg daily.     11/2023 Lipid profile cholesterol  137. Triglycerides 108. HDL 38. LDL 77. TSH 3.510.

## 2024-12-17 NOTE — ASSESSMENT & PLAN NOTE
Type 2 DM  on Metformin 1,000 mg daily. In office today A1c  6.7. 07/2024 Urine albumin elevated at 48.2 decreased from 120  On ARB.   No hypoglycemic events. No DPN. Current with eye exam     Cardiology started him on Jardiance but stopped after rise in creatinine       Lab Results   Component Value Date    HGBA1C 6.7 (A) 12/17/2024           Orders:    POCT hemoglobin A1c

## 2024-12-17 NOTE — PROGRESS NOTES
Name: Magdaleno Tinoco      : 1945      MRN: [id number]  Encounter Provider: Hill Davalos MD  Encounter Date: 2024   Encounter department: [facility]  :  Assessment & Plan  Type 2 diabetes mellitus with microalbuminuria, without long-term current use of insulin (HCC)    Type 2 DM  on Metformin 1,000 mg daily. In office today A1c  6.7. 2024 Urine albumin elevated at 48.2 decreased from 120  On ARB.   No hypoglycemic events. No DPN. Current with eye exam     Cardiology started him on Jardiance but stopped after rise in creatinine       Lab Results   Component Value Date    HGBA1C 6.7 (A) 2024           Orders:    POCT hemoglobin A1c    Primary hypertension    Hypertension/CKD stage 3 blood pressures have been stable on  Metoprolol ER 25 mg daily and Entreso 49/51 BID. No longer on Aldactone.      2024 creatinine 1.18. GFR 64. Electrolytes normal. Hgb 14.2.     2023 creatinine 1.42. GFR 47.      2022 creatinine 1.33. GFR 51.            Stage 3a chronic kidney disease (HCC)  Lab Results   Component Value Date    EGFR 60 2024    EGFR 51 (L) 2024    EGFR 40 (L) 2024    CREATININE 1.23 2024    CREATININE 1.41 (H) 2024    CREATININE 1.72 (H) 2024            Mixed hyperlipidemia    Hyperlipidemia on Simvastatin 40 mg daily.     2023 Lipid profile cholesterol  137. Triglycerides 108. HDL 38. LDL 77. TSH 3.510.           CHF (congestive heart failure), NYHA class I, chronic, systolic ([facility])  Wt Readings from Last 3 Encounters:   24 80.1 kg (176 lb 9.6 oz)   24 80.1 kg (176 lb 8 oz)   24 78 kg (172 lb)     Nonischemic cardiomyopathy followed by Cardiology.  2023 echocardiogram-left ventricle mildly dilated.  Mild concentric hypertrophy.  Left ventricular systolic function severely reduced with EF 30 to 35%.  Wall motion within normal limits.  Grade 1 mild diastolic dysfunction.  No significant valvular disease.   Aorta normal size.  No pericardial effusion.      01/2022 admission for VVI-ICD.     07/2021 nuclear stress test consistent with severe nonischemic cardiomyopathy with severely dilated left ventricle.  EF 27%.  Severe global hypokinesis.  No reversible defects to suggest ischemia                Paroxysmal supraventricular tachycardia (HCC)         Superior mesenteric artery stenosis (HCC)    2/2021 celiac/mesenteric duplex  aorta is patent and ectatic measuring 2.8cm at its greatest diameter. >70% stenosis in the celiac artery. Velocities decrease with inspiration which may be suggestive of median arcuate ligament syndrome. Splenic and hepatic arteries are patent.  >70% stenosis in the superior mesenteric artery in a fasting state         Celiac artery stenosis (HCC)         Chronic obstructive pulmonary disease, unspecified COPD type (HCC)        Ex-smoker. COPD on Breo daily.  Exertional dyspnea no changes.      07/2024 CT scan chest Stable linear opacity of left upper lobe with traction bronchiectasis and volume loss. Stable 3 mm right lower lobe nodule Emphysematous changes of lungs. There is no tracheal or endobronchial lesion: Stable mild pleural thickening along the anterior left upper lobe          Malignant neoplasm of upper lobe of left lung (HCC)    Adeno CA DEEPALI lung. He opted not to have surgery.  He completed XRT 03/2023.     07/2024 CT scan chest Stable posttreatment changes of left upper lobe without recurrent disease.     09/2023 CT scan chest Consolidative left upper lobe opacity at site of previous mass redemonstrated. Currently, it demonstrates slightly decreased central density/fullness compared to previous PET/CT. This measures about 3.6 x 1.7 cm (2/52). When remeasured in a similar  fashion by myself on the prior PET/CT this measured 4.4 x 1.7 cm. There is some bronchiectasis noted in this area as well as peripheral groundglass density. Overall appearance suggests diminishing tumor mass in a  background of developing radiation  changes. Overlying increased pleural thickening in this area compared to the prior study could reflect reactive posttreatment changes.  Stable tiny left lower lobe nodules. Fusiform ectasia ascending thoracic aorta measuring 40 mm.      06/2023 PET/CT scan Now mild patchy FDG uptake at the site of the previously seen left upper lobe lesion, decreased from the prior exam. Underlying nodular density noted to be slightly larger, overall given decreased activity still likely related to posttreatment  changes. No new findings suspicious for hypermetabolic metastasis.         Continue with current medications. Daily weights. Low Na+ diet   Office visit 6 months.  Follow-up with Cardiology, Radiation oncology.  Up-to-date with flu vaccine.       History of Present Illness       CC Follow up OV for chronic medical problems Medications reviewed. Here with his wife    03/2024 ER visit.Dx pneumonia He was discharged on oral antibiotics-Augmentin. CXR Abnormal parenchymal density in the left midlung field has worsened and may extend towards the hilar region.  No CHF.  Elevated D-dimer resulting in a CTA of chest-no pulmonary embolus.  Patchy left upper lobe airspace opacity with air bronchograms most compatible with infiltrate.  Some asymmetric pleural thickening in the superior left hemithorax anterior laterally.  No pathologically enlarged mediastinal or hilar lymphadenopathy.  No pleural effusion.  Asymmetric pleural thickening along the anterolateral margin of the superior left hemithorax.  Aorta not aneurysmal. COVID-19/influenza/RSV negative. Repeat chest x-ray 5/2024 lung fields remain well aerated.  No infiltrates edema or effusions.  Hilar and mediastinal compartments are without adenopathy.            Review of Systems   Constitutional:  Positive for fatigue. Negative for appetite change, chills, fever and unexpected weight change.   HENT:  Negative for congestion, ear pain,  rhinorrhea, sore throat and trouble swallowing.    Eyes:  Negative for visual disturbance.   Respiratory:  Positive for shortness of breath. Negative for cough and wheezing.    Cardiovascular:  Negative for chest pain, palpitations and leg swelling.   Gastrointestinal:  Negative for abdominal pain, blood in stool, constipation, diarrhea, nausea and vomiting.        OV 11/2021 for weight loss. 12/2021 EGD normal Colonoscopy normal except for 1 polyp. 12/2021 abdominal u/s normal except for bilateral renal cysts. 12/2021 celiac/mesenteric duplex  aorta is patent and ectatic measuring 2.8cm at its greatest diameter. >70% stenosis in the celiac artery. Velocities decrease with inspiration which may be suggestive of median arcuate ligament syndrome. Splenic and hepatic arteries are patent.  >70% stenosis in the superior mesenteric artery in a fasting state. The inferior mesenteric artery is normal and patent.      Endocrine: Negative for polydipsia and polyuria.                                                Genitourinary:  Negative for difficulty urinating.   Musculoskeletal:  Positive for back pain. Negative for arthralgias and myalgias.   Skin:  Negative for rash.         Status post resection BCC left cheek and left neck. 05/2019 s/p resection SCC in situ left lower back.    Allergic/Immunologic: Negative for environmental allergies.   Neurological:  Negative for dizziness and headaches.        02/2022 CT scan head No acute hemorrhage, mass or ischemia identified. Areas of supratentorial white matter low attenuation likely chronic small vessel ischemic disease. Ventricular system, basal cisterns and extra-axial spaces: Prominent compatible   generalized parenchymal volume loss.       Hematological:  Negative for adenopathy. Does not bruise/bleed easily.                      Psychiatric/Behavioral:  Negative for dysphoric mood and sleep disturbance.        Objective     /78 (BP Location: Left arm, Patient  "Position: Sitting, Cuff Size: Large)   Pulse 71   Temp (!) 97.4 °F (36.3 °C) (Temporal)   Resp 18   Ht 5' 8\" (1.727 m)   Wt 79.8 kg (176 lb)   SpO2 100%   BMI 26.76 kg/m²       Wt Readings from Last 3 Encounters:   12/17/24 79.8 kg (176 lb)   07/11/24 80.1 kg (176 lb 9.6 oz)   06/07/24 80.1 kg (176 lb 8 oz)         Physical Exam  Constitutional:       General: He is not in acute distress.  HENT:      Right Ear: Tympanic membrane and ear canal normal.      Left Ear: Tympanic membrane and ear canal normal.   Eyes:      General: No scleral icterus.     Extraocular Movements: Extraocular movements intact.      Conjunctiva/sclera: Conjunctivae normal.      Pupils: Pupils are equal, round, and reactive to light.   Neck:      Vascular: No carotid bruit.   Cardiovascular:      Rate and Rhythm: Normal rate and regular rhythm.      Pulses: no weak pulses.           Dorsalis pedis pulses are 2+ on the right side and 2+ on the left side.        Posterior tibial pulses are 2+ on the right side and 2+ on the left side.      Heart sounds: No murmur heard.     No gallop.   Pulmonary:      Effort: Pulmonary effort is normal.      Breath sounds: Normal breath sounds.   Musculoskeletal:      Right lower leg: No edema.      Left lower leg: No edema.   Feet:      Right foot:      Skin integrity: No ulcer, skin breakdown, erythema, warmth, callus or dry skin.      Left foot:      Skin integrity: No ulcer, skin breakdown, erythema, warmth, callus or dry skin.   Lymphadenopathy:      Cervical: No cervical adenopathy.      Upper Body:      Right upper body: No supraclavicular adenopathy.      Left upper body: No supraclavicular adenopathy.   Skin:     Coloration: Skin is not cyanotic.      Findings: No rash.      Nails: There is no clubbing.   Neurological:      General: No focal deficit present.      Mental Status: He is alert and oriented to person, place, and time.   Psychiatric:         Mood and Affect: Mood normal. "       Patient's shoes and socks removed.    Right Foot/Ankle   Right Foot Inspection  Skin Exam: skin normal and skin intact. No dry skin, no warmth, no callus, no erythema, no maceration, no abnormal color, no pre-ulcer, no ulcer and no callus.     Toe Exam: ROM and strength within normal limits. No swelling, no tenderness, erythema and  no right toe deformity    Sensory   Monofilament testing: diminished    Vascular  Capillary refills: < 3 seconds  The right DP pulse is 2+. The right PT pulse is 2+.     Left Foot/Ankle  Left Foot Inspection  Skin Exam: skin normal and skin intact. No dry skin, no warmth, no erythema, no maceration, normal color, no pre-ulcer, no ulcer and no callus.     Toe Exam: ROM and strength within normal limits. No swelling, no tenderness, no erythema and no left toe deformity.     Sensory   Monofilament testing: diminished    Vascular  Capillary refills: < 3 seconds  The left DP pulse is 2+. The left PT pulse is 2+.     Assign Risk Category  No deformity present  Loss of protective sensation  No weak pulses  Risk: 1

## 2024-12-17 NOTE — ASSESSMENT & PLAN NOTE
Adeno CA DEEPALI lung. He opted not to have surgery.  He completed XRT 03/2023.     07/2024 CT scan chest Stable posttreatment changes of left upper lobe without recurrent disease.     09/2023 CT scan chest Consolidative left upper lobe opacity at site of previous mass redemonstrated. Currently, it demonstrates slightly decreased central density/fullness compared to previous PET/CT. This measures about 3.6 x 1.7 cm (2/52). When remeasured in a similar  fashion by myself on the prior PET/CT this measured 4.4 x 1.7 cm. There is some bronchiectasis noted in this area as well as peripheral groundglass density. Overall appearance suggests diminishing tumor mass in a background of developing radiation  changes. Overlying increased pleural thickening in this area compared to the prior study could reflect reactive posttreatment changes.  Stable tiny left lower lobe nodules. Fusiform ectasia ascending thoracic aorta measuring 40 mm.      06/2023 PET/CT scan Now mild patchy FDG uptake at the site of the previously seen left upper lobe lesion, decreased from the prior exam. Underlying nodular density noted to be slightly larger, overall given decreased activity still likely related to posttreatment  changes. No new findings suspicious for hypermetabolic metastasis.

## 2024-12-17 NOTE — ASSESSMENT & PLAN NOTE
Hypertension/CKD stage 3 blood pressures have been stable on  Metoprolol ER 25 mg daily and Entreso 49/51 BID. No longer on Aldactone.      03/2024 creatinine 1.18. GFR 64. Electrolytes normal. Hgb 14.2.     03/2023 creatinine 1.42. GFR 47.      12/2022 creatinine 1.33. GFR 51.

## 2024-12-17 NOTE — ASSESSMENT & PLAN NOTE
Wt Readings from Last 3 Encounters:   07/11/24 80.1 kg (176 lb 9.6 oz)   06/07/24 80.1 kg (176 lb 8 oz)   04/03/24 78 kg (172 lb)     Nonischemic cardiomyopathy followed by Cardiology.  1    1/2023 echocardiogram-left ventricle mildly dilated.  Mild concentric hypertrophy.  Left ventricular systolic function severely reduced with EF 30 to 35%.  Wall motion within normal limits.  Grade 1 mild diastolic dysfunction.  No significant valvular disease.  Aorta normal size.  No pericardial effusion.      01/2022 admission for VVI-ICD.     07/2021 nuclear stress test consistent with severe nonischemic cardiomyopathy with severely dilated left ventricle.  EF 27%.  Severe global hypokinesis.  No reversible defects to suggest ischemia

## 2024-12-17 NOTE — ASSESSMENT & PLAN NOTE
2/2021 celiac/mesenteric duplex  aorta is patent and ectatic measuring 2.8cm at its greatest diameter. >70% stenosis in the celiac artery. Velocities decrease with inspiration which may be suggestive of median arcuate ligament syndrome. Splenic and hepatic arteries are patent.  >70% stenosis in the superior mesenteric artery in a fasting state

## 2024-12-18 ENCOUNTER — TELEPHONE (OUTPATIENT)
Dept: ADMINISTRATIVE | Facility: OTHER | Age: 79
End: 2024-12-18

## 2024-12-18 NOTE — TELEPHONE ENCOUNTER
----- Message from Jelly MCLAUGHLIN sent at 12/17/2024  2:58 PM EST -----  Regarding: Care Gap Request  12/17/24 2:58 PM    Hello, our patient above has had Diabetic Eye Exam completed/performed. Please assist in updating the patient chart by pulling the document from the Media Tab. The date of service is 12/17/2024.     Thank you,  CARLOS EDUARDO Santillan PG

## 2024-12-18 NOTE — LETTER
Diabetic Eye Exam Form    Date Requested: 24  Patient: Magdaleno Tinoco  Patient : 1945   Referring Provider: Hill Davalos MD      DIABETIC Eye Exam Date ______[id number]_________________________      Type of Exam MUST be documented for Diabetic Eye Exams. Please CHECK ONE.     Retinal Exam       Dilated Retinal Exam       OCT       Optomap-Iris Exam      Fundus Photography       Left Eye - Please check Retinopathy or No Retinopathy        Exam did show retinopathy    Exam did not show retinopathy       Right Eye - Please check Retinopathy or No Retinopathy       Exam did show retinopathy    Exam did not show retinopathy       Comments __________________________________________________________    Practice Providing Exam ______________________________________________    Exam Performed By (print name) _______________________________________      Provider Signature ___________________________________________________      These reports are needed for  compliance.  Please fax this completed form and a copy of the Diabetic Eye Exam report to our office located at [address] [place] [address] as soon as possible via Fax [phone] micky Knight: Phone [phone]  We thank you for your assistance in treating our mutual patient.

## 2024-12-18 NOTE — LETTER
Diabetic Eye Exam Form    Date Requested: 24  Patient: Magdaleno Tinoco  Patient : 1945   Referring Provider: Hill Davalos MD      DIABETIC Eye Exam Date _____[id number]__________________________      Type of Exam MUST be documented for Diabetic Eye Exams. Please CHECK ONE.     Retinal Exam       Dilated Retinal Exam       OCT       Optomap-Iris Exam      Fundus Photography       Left Eye - Please check Retinopathy or No Retinopathy        Exam did show retinopathy    Exam did not show retinopathy       Right Eye - Please check Retinopathy or No Retinopathy       Exam did show retinopathy    Exam did not show retinopathy       Comments __________________________________________________________    Practice Providing Exam ______________________________________________    Exam Performed By (print name) _______________________________________      Provider Signature ___________________________________________________      These reports are needed for  compliance.  Please fax this completed form and a copy of the Diabetic Eye Exam report to our office located at [address] [place] [address] as soon as possible via Fax [phone] micky Knight: Phone [phone]  We thank you for your assistance in treating our mutual patient.

## 2024-12-18 NOTE — TELEPHONE ENCOUNTER
Upon review of the In Basket request we have found/obtained the documentation. After careful review of the document we are unable to complete this request for Diabetic Eye Exam because the documentation does not have the proper verbiage (wording) needed to close the requested care gap(s) and the documentation does not have the result(s) needed to close the requested care gap(s). Media tab [id number] letter sent if comes back, we will update hm.    Any additional questions or concerns should be emailed to the Practice Liaisons via the appropriate education email address, please do not reply via In Basket.    Thank you  Antonia Delaney MA   [facility]

## 2024-12-27 ENCOUNTER — TELEPHONE (OUTPATIENT)
Dept: FAMILY MEDICINE CLINIC | Facility: CLINIC | Age: 79
End: 2024-12-27

## 2024-12-27 NOTE — TELEPHONE ENCOUNTER
Called [employer] and spoke with sherri.asked if she was able to fax over eye report to the office she was able to fax it but stated last time patient was seen was may of 2023.

## 2024-12-27 NOTE — TELEPHONE ENCOUNTER
----- Message from Hill Davalos MD sent at 12/17/2024  8:45 AM EST -----  Looking for last diabetic eye exam from Taping[facility] Felicity GASCA on [address]  JJM

## 2025-01-03 ENCOUNTER — TELEPHONE (OUTPATIENT)
Dept: ADMINISTRATIVE | Facility: OTHER | Age: 80
End: 2025-01-03

## 2025-01-03 NOTE — TELEPHONE ENCOUNTER
----- Message from Richard GRAVES sent at 1/2/2025  2:20 PM EST -----  Regarding: Care Gap Request  01/02/25 2:20 PM    Hello, our patient above has had Diabetic Eye Exam completed/performed. Please assist in updating the patient chart by pulling the document from the Media Tab scanned on 01/02/25. The date of service is 05/27/23.     Thank you,  CARLOS EDUARDO Oliva [facility]

## 2025-01-08 ENCOUNTER — HOSPITAL ENCOUNTER (OUTPATIENT)
Dept: RADIOLOGY | Facility: HOSPITAL | Age: 80
Discharge: HOME | End: 2025-01-08
Attending: RADIOLOGY
Payer: COMMERCIAL

## 2025-01-08 DIAGNOSIS — C34.12 MALIGNANT NEOPLASM OF UPPER LOBE OF LEFT LUNG (HCC): ICD-10-CM

## 2025-01-08 PROCEDURE — 71250 CT THORAX DX C-: CPT

## 2025-01-16 ENCOUNTER — OFFICE VISIT (OUTPATIENT)
Dept: RADIATION ONCOLOGY | Facility: CLINIC | Age: 80
End: 2025-01-16
Attending: RADIOLOGY
Payer: COMMERCIAL

## 2025-01-16 VITALS
OXYGEN SATURATION: 98 % | SYSTOLIC BLOOD PRESSURE: 116 MMHG | DIASTOLIC BLOOD PRESSURE: 80 MMHG | TEMPERATURE: 98 F | HEART RATE: 78 BPM | BODY MASS INDEX: 26.79 KG/M2 | WEIGHT: 176.2 LBS

## 2025-01-16 DIAGNOSIS — C34.12 MALIGNANT NEOPLASM OF UPPER LOBE OF LEFT LUNG (HCC): Primary | ICD-10-CM

## 2025-01-16 PROCEDURE — 99211 OFF/OP EST MAY X REQ PHY/QHP: CPT | Performed by: RADIOLOGY

## 2025-01-16 PROCEDURE — 99213 OFFICE O/P EST LOW 20 MIN: CPT | Performed by: RADIOLOGY

## 2025-01-16 NOTE — ASSESSMENT & PLAN NOTE
Orders:    CT chest wo contrast; Future  Magdaleno Tinoco is a 79 y.o. 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  He had a CAT scan of the chest without contrast on September 20, 2022 that revealed a 2 cm spiculated cavitary nodule in the left upper lobe.  He had a PET/CT study November 1, 2022 that confirmed a 2.3 cm hypermetabolic spiculated left upper lobe nodule that could represent malignancy.  There was no evidence of any mediastinal lymphadenopathy.  Left upper lobe lung biopsy December 19, 2022 confirmed adenocarcinoma compatible with lung primary.  He was seen by Dr. Baxter from thoracic surgery on December 28, 2022 and offered surgical resection versus radiation therapy using stereotactic body radiotherapy.  He was reviewed on January 9, 2023 at thoracic oncology multidisciplinary case review.  Recommendations were made for cardiopulmonary exercise stress test that was scheduled for January 13, 2023 and then follow-up with Dr. Baxter to discuss potential surgery.  He notified thoracic surgery on January 17, 2022 he did not wish to pursue surgical resection. He was seen for consultation to pursue definitive radiation therapy using stereotactic body radiotherapy.  We discussed that this can be just as effective as surgery and avoid the risks of surgery. We reviewed his imaging studies including CAT scan of the chest and PET/CT with the patient, his wife and daughter. His left upper lobe lung primary was below the region of his ICD located in the left upper chest.  He received stereotactic body radiotherapy without any dose to his ICD.  He completed treatments in the [facility] on March 3, 2023.     He is seen for follow-up today.  He is doing well from a respiratory standpoint.  He had a repeat CT of the chest without contrast September 13, 2023 that reveals some posttreatment changes with decrease in central density indicating  further response to treatment which is in a background of radiation changes.  There were some stable tiny left lower lobe nodules and no new nodules.  His chest CT January 11, 2024 revealed no evidence of recurrent disease and no new malignancy in the chest.  There was some mild improvement of the left upper lobe posttreatment changes.  He had a CTA study March 27, 2024 that revealed no pulmonary embolus.  There was some posttreatment changes noted in the left upper lobe region.  His chest CT July 8, 2024 revealed stable posttreatment changes in the left upper lobe without any recurrent disease and there were no other suspicious nor new nodules.  His most recent chest CT performed January 8, 2025 revealed a slight increase in soft tissue in the left upper lobe at the site of his previous radiation fibrosis.  The fibrosis also has a slightly different shape/configuration when I reviewed the images.  This most likely represents progression of radiation fibrosis.  There was a slight increase in groundglass opacity in the peripheral right lower lobe that is inflammatory in nature.  He is doing well and remains without any evidence of disease.  CT results were discussed with him and his daughter today.  He will return in 6 months for follow-up with a repeat chest CT without contrast.  He also has follow-up with Dr. Davalos on September 9, 2025.

## 2025-01-16 NOTE — PROGRESS NOTES
Magdaleno Tinoco 1945 is a 79 y.o. male With h/o malignant neoplasm of DEEPALI,stage IA3. He completed SBRT to the DEEPALI on 3/3/23. He was last seen in Radiation Oncology  on 7/11/24. He returns today for a follow up visit.    1/8/25  CT chest wo contrast  IMPRESSION:  Increase soft tissue in the left upper lobe at the site of radiation fibrosis. While this could be due to progression of radiation fibrosis, tumor recurrence not excluded.     Slightly increased groundglass opacity in the peripheral right lower lobe, likely inflammatory.       Follow up visit     Oncology History   Malignant neoplasm of upper lobe of left lung (HCC)   12/19/2022 Biopsy    A.  Lung, left upper lobe nodule, biopsy:     - Adenocarcinoma compatible with a lung primary.     12/28/2022 Initial Diagnosis    Malignant neoplasm of upper lobe of left lung (HCC)     1/24/2023 -  Cancer Staged    Staging form: Lung, AJCC 8th Edition  - Clinical stage from 1/24/2023: Stage IA3 (cT1c, cN0, cM0) - Signed by Anthony Gorman MD on 1/24/2023  Histopathologic type: Adenocarcinoma, NOS  Stage prefix: Initial diagnosis  Laterality: Left  Tumor size (mm): 23  Stage used in treatment planning: Yes  National guidelines used in treatment planning: Yes       2/21/2023 - 3/3/2023 Radiation    Course: C1 SBRT    Plan ID Energy Fractions Dose per Fraction (cGy) Dose Correction (cGy) Total Dose Delivered (cGy) Elapsed Days   SBRT DEEPALI 6X 5 / 5 1,000 0 5,000 10      Treatment dates:  C1 SBRT: 2/21/2023 - 3/3/2023          Surgery           Review of Systems:  Review of Systems   Constitutional:  Positive for fatigue (feels tired all the time) and fever (states he occasionally gets fever.  Last had 3 weeks ago).   HENT:  Positive for voice change.         Frequent URI    Eyes: Negative.    Respiratory:  Positive for cough (non productive) and shortness of breath. Negative for wheezing.    Cardiovascular: Negative.    Gastrointestinal:  Positive for diarrhea  (states frequent diarrhea).   Genitourinary: Negative.    Musculoskeletal:  Positive for back pain (chronic back pain).   Skin: Negative.    Allergic/Immunologic: Negative.    Neurological:  Positive for light-headedness (states he frequently feels dizzy) and numbness (right foot).   Psychiatric/Behavioral:  Negative for sleep disturbance.         Declined SS referral       Clinical Trial: no    Teaching completed    Health Maintenance   Topic Date Due    Hepatitis A Vaccine (1 of 2 - Risk 2-dose series) Never done    COVID-19 Vaccine (3 - Pfizer risk series) 09/28/2021    BMI: Followup Plan  06/06/2024    Depression Screening  07/11/2025    Hepatitis C Screening  06/07/2025 (Originally 1945)    RSV Vaccine Age 60+ Years (1 - 1-dose 75+ series) 06/07/2025 (Originally 8/6/2022)    Zoster Vaccine (1 of 2) 06/07/2025 (Originally 8/6/1964)    DM Eye Exam  05/27/2025    Fall Risk  06/07/2025    Medicare Annual Wellness Visit (AWV)  06/07/2025    HEMOGLOBIN A1C  06/17/2025    Kidney Health Evaluation: Albumin/Creatinine Ratio  07/29/2025    Kidney Health Evaluation: GFR  09/09/2025    BMI: Adult  12/17/2025    Diabetic Foot Exam  12/17/2025    Pneumococcal Vaccine: 65+ Years  Completed    Influenza Vaccine  Completed    Meningococcal B Vaccine  Aged Out    RSV Vaccine age 0-20 Months  Aged Out    HIB Vaccine  Aged Out    IPV Vaccine  Aged Out    Meningococcal ACWY Vaccine  Aged Out    HPV Vaccine  Aged Out    Lung Cancer Screening  Discontinued    Colorectal Cancer Screening  Discontinued     Patient Active Problem List   Diagnosis    Cardiomyopathy (HCC)    CHF (congestive heart failure), NYHA class I, chronic, systolic (HCC)    COPD (chronic obstructive pulmonary disease) (HCC)    Hyperlipidemia    Hypertension    Old myocardial infarction    Paroxysmal supraventricular tachycardia (HCC)    Hyperuricemia    Lumbar degenerative disc disease    Type 2 diabetes mellitus with microalbuminuria, without long-term current  "use of insulin (HCC)    Bilateral renal cysts    Superior mesenteric artery stenosis (HCC)    Celiac artery stenosis (HCC)    Automatic implantable cardiac defibrillator in situ    IVCD (intraventricular conduction defect)    Cigarette nicotine dependence in remission    Malignant neoplasm of upper lobe of left lung (HCC)    Stage 3a chronic kidney disease (HCC)     Past Medical History:   Diagnosis Date    Angina pectoris (HCC)     \"in the past\"    Basal cell carcinoma     Resolved 8/25/2017     Colitis 11/22/2017    COPD (chronic obstructive pulmonary disease) (HCC)     Diabetes mellitus (HCC)     NIDDM    Hyperlipidemia     Hypertension     Lung cancer (HCC)     Malignant melanoma (HCC) 02/24/2020     Past Surgical History:   Procedure Laterality Date    CARDIAC SURGERY  2014    cardiac cath    COLONOSCOPY      HAND SURGERY Left     IR BIOPSY LUNG  12/19/2022    KNEE ARTHROSCOPY Right     NECK SURGERY      bone graft and plates    WA EXCISION MALIGNANT LESION F/E/E/N/L >4.0 CM N/A 04/29/2016    Procedure: LEFT CHEEK BCC EXCISION; FROZEN SECTION; BILATERAL LOWER EYELIDS SUSPICIOUS LESION EXCISION; LEFT CHEEK EXTRACTION OF COMEDONES X 2;  Surgeon: Declan Paulino MD;  Location: [facility];  Service: Plastics    WA EXCISION MALIGNANT LESION TRUNK/ARM/LEG > 4.0 CM Left 05/30/2019    Procedure: EXCISION WIDE LESION TRUNK/ABDOMEN/BACK;  Surgeon: Ramon Ayala MD;  Location: [facility];  Service: General     Family History   Problem Relation Age of Onset    Stomach cancer Mother     Colon cancer Sister      Social History     Socioeconomic History    Marital status: /Civil Union     Spouse name: Not on file    Number of children: Not on file    Years of education: Not on file    Highest education level: Not on file   Occupational History    Not on file   Tobacco Use    Smoking status: Former     Current packs/day: 0.00     Average packs/day: 2.0 packs/day for 45.8 years (91.5 ttl pk-yrs)     Types: Cigarettes     " Start date:      Quit date: 10/4/2017     Years since quittin.2    Smokeless tobacco: Never   Vaping Use    Vaping status: Never Used   Substance and Sexual Activity    Alcohol use: Not Currently     Alcohol/week: 1.0 standard drink of alcohol     Types: 1 Cans of beer per week     Comment: 1 beer a week    Drug use: Never    Sexual activity: Not Currently   Other Topics Concern    Not on file   Social History Narrative    Former smoker - As per [employer]      Social Drivers of Health     Financial Resource Strain: Low Risk  (2023)    Overall Financial Resource Strain (CARDIA)     Difficulty of Paying Living Expenses: Not hard at all   Food Insecurity: No Food Insecurity (2024)    Nursing - Inadequate Food Risk Classification     Worried About Running Out of Food in the Last Year: Never true     Ran Out of Food in the Last Year: Never true     Ran Out of Food in the Last Year: Not on file   Transportation Needs: No Transportation Needs (2024)    PRAPARE - Transportation     Lack of Transportation (Medical): No     Lack of Transportation (Non-Medical): No   Physical Activity: Not on file   Stress: Not on file   Social Connections: Not on file   Intimate Partner Violence: Not on file   Housing Stability: Low Risk  (2024)    Housing Stability Vital Sign     Unable to Pay for Housing in the Last Year: No     Number of Times Moved in the Last Year: 0     Homeless in the Last Year: No       Current Outpatient Medications:     aspirin 81 MG tablet, Take 81 mg by mouth daily., Disp: , Rfl:     Fluticasone Furoate-Vilanterol (Breo Ellipta) 100-25 mcg/actuation inhaler, Inhale 1 puff daily, Disp: 180 each, Rfl: 3    metFORMIN (GLUCOPHAGE) 1000 MG tablet, TAKE 1 TABLET BY MOUTH TWICE  DAILY WITH MEALS, Disp: 180 tablet, Rfl: 1    metoprolol succinate (TOPROL-XL) 25 mg 24 hr tablet, Take 1 tablet (25 mg total) by mouth daily, Disp: 90 tablet, Rfl: 3    nitroglycerin (NITROSTAT) 0.4 mg SL tablet, , Disp:  , Rfl:     sacubitril-valsartan (Entresto) 49-51 MG TABS, Take 1 tablet by mouth 2 (two) times a day, Disp: , Rfl:     simvastatin (ZOCOR) 40 mg tablet, Take 1 tablet (40 mg total) by mouth daily at bedtime for 90 days, Disp: 90 tablet, Rfl: 3    spironolactone (ALDACTONE) 25 mg tablet, Take 0.5 tablets (12.5 mg total) by mouth daily, Disp: 45 tablet, Rfl: 3  No Known Allergies  There were no vitals filed for this visit.

## 2025-01-16 NOTE — PROGRESS NOTES
Follow-up Visit   Name: Magdaleno Tinoco      : 1945      MRN: [id number]  Encounter Provider: Anthony Gorman MD  Encounter Date: 2025   Encounter department: [facility] RADIATION ONCOLOGY  :  Assessment & Plan  Malignant neoplasm of upper lobe of left lung (HCC)    Orders:    CT chest wo contrast; Future  Magdaleno Tinoco is a 79 y.o. 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  He had a CAT scan of the chest without contrast on 2022 that revealed a 2 cm spiculated cavitary nodule in the left upper lobe.  He had a PET/CT study 2022 that confirmed a 2.3 cm hypermetabolic spiculated left upper lobe nodule that could represent malignancy.  There was no evidence of any mediastinal lymphadenopathy.  Left upper lobe lung biopsy 2022 confirmed adenocarcinoma compatible with lung primary.  He was seen by Dr. Baxter from thoracic surgery on 2022 and offered surgical resection versus radiation therapy using stereotactic body radiotherapy.  He was reviewed on 2023 at thoracic oncology multidisciplinary case review.  Recommendations were made for cardiopulmonary exercise stress test that was scheduled for 2023 and then follow-up with Dr. Baxter to discuss potential surgery.  He notified thoracic surgery on 2022 he did not wish to pursue surgical resection. He was seen for consultation to pursue definitive radiation therapy using stereotactic body radiotherapy.  We discussed that this can be just as effective as surgery and avoid the risks of surgery. We reviewed his imaging studies including CAT scan of the chest and PET/CT with the patient, his wife and daughter. His left upper lobe lung primary was below the region of his ICD located in the left upper chest.  He received stereotactic body radiotherapy without any dose to his ICD.  He completed  treatments in the [facility] on March 3, 2023.     He is seen for follow-up today.  He is doing well from a respiratory standpoint.  He had a repeat CT of the chest without contrast September 13, 2023 that reveals some posttreatment changes with decrease in central density indicating further response to treatment which is in a background of radiation changes.  There were some stable tiny left lower lobe nodules and no new nodules.  His chest CT January 11, 2024 revealed no evidence of recurrent disease and no new malignancy in the chest.  There was some mild improvement of the left upper lobe posttreatment changes.  He had a CTA study March 27, 2024 that revealed no pulmonary embolus.  There was some posttreatment changes noted in the left upper lobe region.  His chest CT July 8, 2024 revealed stable posttreatment changes in the left upper lobe without any recurrent disease and there were no other suspicious nor new nodules.  His most recent chest CT performed January 8, 2025 revealed a slight increase in soft tissue in the left upper lobe at the site of his previous radiation fibrosis.  The fibrosis also has a slightly different shape/configuration when I reviewed the images.  This most likely represents progression of radiation fibrosis.  There was a slight increase in groundglass opacity in the peripheral right lower lobe that is inflammatory in nature.  He is doing well and remains without any evidence of disease.  CT results were discussed with him and his daughter today.  He will return in 6 months for follow-up with a repeat chest CT without contrast.  He also has follow-up with Dr. Davalos on September 9, 2025.    History of Present Illness   Chief Complaint   Patient presents with    Follow-up   Pertinent Medical History     Magdaleno Tinoco 1945 is a 79 y.o. male With h/o malignant neoplasm of DEEPALI,stage IA3. He completed SBRT to the DEEPALI on 3/3/23. He was last seen in Radiation  Oncology  on 24. He returns today for a follow up visit.     25  CT chest wo contrast  IMPRESSION:  Increase soft tissue in the left upper lobe at the site of radiation fibrosis. While this could be due to progression of radiation fibrosis, tumor recurrence not excluded.     Slightly increased groundglass opacity in the peripheral right lower lobe, likely inflammatory.    He is seen for follow-up today with his daughter.  He reports he has stable shortness of breath on exertion and none at rest. He also has a stable mild cough and often clears his throat. He has no sputum production.  He denies any hemoptysis.  He has a good appetite with no dysphagia.  He does not smoke any tobacco since quitting in 2017.  He has a previous history of skin cancers with melanoma and basal cell carcinomas that have been excised which have not recurred.  He denies any new or suspicious skin lesions.     Upcomin25 Dr. Davalos PCP    Oncology History   Cancer Staging   Malignant neoplasm of upper lobe of left lung (HCC)  Staging form: Lung, AJCC 8th Edition  - Clinical stage from 2023: Stage IA3 (cT1c, cN0, cM0) - Signed by Anthony Gorman MD on 2023  Histopathologic type: Adenocarcinoma, NOS  Stage prefix: Initial diagnosis  Laterality: Left  Tumor size (mm): 23  Stage used in treatment planning: Yes  National guidelines used in treatment planning: Yes  Oncology History   Malignant neoplasm of upper lobe of left lung (HCC)   2022 Biopsy    A.  Lung, left upper lobe nodule, biopsy:     - Adenocarcinoma compatible with a lung primary.     2022 Initial Diagnosis    Malignant neoplasm of upper lobe of left lung (HCC)     2023 -  Cancer Staged    Staging form: Lung, AJCC 8th Edition  - Clinical stage from 2023: Stage IA3 (cT1c, cN0, cM0) - Signed by Anthony Gorman MD on 2023  Histopathologic type: Adenocarcinoma, NOS  Stage prefix: Initial diagnosis  Laterality:  Left  Tumor size (mm): 23  Stage used in treatment planning: Yes  National guidelines used in treatment planning: Yes       2023 - 3/3/2023 Radiation    Course: C1 SBRT    Plan ID Energy Fractions Dose per Fraction (cGy) Dose Correction (cGy) Total Dose Delivered (cGy) Elapsed Days   SBRT DEEPALI 6X 5 / 5 1,000 0 5,000 10      Treatment dates:  C1 SBRT: 2023 - 3/3/2023          Surgery          Review of Systems Refer to nursing note.    Current Outpatient Medications on File Prior to Visit   Medication Sig Dispense Refill    aspirin 81 MG tablet Take 81 mg by mouth daily.      Fluticasone Furoate-Vilanterol (Breo Ellipta) 100-25 mcg/actuation inhaler Inhale 1 puff daily 180 each 3    metFORMIN (GLUCOPHAGE) 1000 MG tablet TAKE 1 TABLET BY MOUTH TWICE  DAILY WITH MEALS 180 tablet 1    metoprolol succinate (TOPROL-XL) 25 mg 24 hr tablet Take 1 tablet (25 mg total) by mouth daily 90 tablet 3    sacubitril-valsartan (Entresto) 49-51 MG TABS Take 1 tablet by mouth 2 (two) times a day      simvastatin (ZOCOR) 40 mg tablet Take 1 tablet (40 mg total) by mouth daily at bedtime for 90 days 90 tablet 3    spironolactone (ALDACTONE) 25 mg tablet Take 0.5 tablets (12.5 mg total) by mouth daily 45 tablet 3    nitroglycerin (NITROSTAT) 0.4 mg SL tablet  (Patient not taking: Reported on 2023)       No current facility-administered medications on file prior to visit.      Social History     Tobacco Use    Smoking status: Former     Current packs/day: 0.00     Average packs/day: 2.0 packs/day for 45.8 years (91.5 ttl pk-yrs)     Types: Cigarettes     Start date:      Quit date: 10/4/2017     Years since quittin.2    Smokeless tobacco: Never   Vaping Use    Vaping status: Never Used   Substance and Sexual Activity    Alcohol use: Not Currently     Alcohol/week: 1.0 standard drink of alcohol     Types: 1 Cans of beer per week     Comment: 1 beer a week    Drug use: Never    Sexual activity: Not Currently          Objective   /80   Pulse 78   Temp 98 °F (36.7 °C)   Wt 79.9 kg (176 lb 3.2 oz)   SpO2 98%   BMI 26.79 kg/m²     Pain Screening:  Pain Score: 0-No pain  ECOG    Physical Exam   Vitals and nursing note reviewed.   Constitutional:       General: He is not in acute distress.     Appearance: He is well-developed. He is not diaphoretic.   HENT:      Head: Normocephalic and atraumatic.      Mouth/Throat:      Pharynx: No oropharyngeal exudate.   Eyes:      General: No scleral icterus.     Conjunctiva/sclera: Conjunctivae normal.      Pupils: Pupils are equal, round, and reactive to light.   Neck:      Thyroid: No thyromegaly.      Trachea: No tracheal deviation.   Cardiovascular:      Rate and Rhythm: Normal rate and regular rhythm.      Heart sounds: Normal heart sounds.   Pulmonary:      Effort: Pulmonary effort is normal. No respiratory distress.      Breath sounds: Normal breath sounds. No stridor. No wheezing, rhonchi or rales.   Chest:      Chest wall: No tenderness.   Abdominal:      General: Bowel sounds are normal. There is no distension.      Palpations: Abdomen is soft. There is no mass.      Tenderness: There is no abdominal tenderness.   Musculoskeletal:         General: No swelling or tenderness. Normal range of motion.      Cervical back: Normal range of motion and neck supple.   Lymphadenopathy:      Cervical: No cervical adenopathy.      Upper Body:      Right upper body: No supraclavicular adenopathy.      Left upper body: No supraclavicular adenopathy.   Skin:     General: Skin is warm and dry.      Coloration: Skin is not jaundiced or pale.      Findings: No erythema or rash.   Neurological:      General: No focal deficit present.      Mental Status: He is alert and oriented to person, place, and time.      Cranial Nerves: No cranial nerve deficit.      Coordination: Coordination normal.   Psychiatric:         Mood and Affect: Mood normal.         Behavior: Behavior normal.         Thought  "Content: Thought content normal.         Judgment: Judgment normal.      Administrative Statements   I have spent a total time of 20 minutes in caring for this patient on the day of the visit/encounter including Diagnostic results, Prognosis, Impressions, Counseling / Coordination of care, Documenting in the medical record, Reviewing / ordering tests, medicine, procedures  , and Obtaining or reviewing history  .   Portions of the record may have been created with voice recognition software.  Occasional wrong word or \"sound a like\" substitutions may have occurred due to the inherent limitations of voice recognition software.  Read the chart carefully and recognize, using context, where substitutions have occurred.  "

## 2025-02-21 DIAGNOSIS — E11.29 TYPE 2 DIABETES MELLITUS WITH MICROALBUMINURIA, WITHOUT LONG-TERM CURRENT USE OF INSULIN (HCC): ICD-10-CM

## 2025-02-21 DIAGNOSIS — R80.9 TYPE 2 DIABETES MELLITUS WITH MICROALBUMINURIA, WITHOUT LONG-TERM CURRENT USE OF INSULIN (HCC): ICD-10-CM

## 2025-05-17 DIAGNOSIS — J44.9 CHRONIC OBSTRUCTIVE PULMONARY DISEASE, UNSPECIFIED COPD TYPE (HCC): ICD-10-CM

## 2025-05-18 RX ORDER — FLUTICASONE FUROATE AND VILANTEROL TRIFENATATE 100; 25 UG/1; UG/1
1 POWDER RESPIRATORY (INHALATION) DAILY
Qty: 180 EACH | Refills: 1 | Status: SHIPPED | OUTPATIENT
Start: 2025-05-18

## 2025-05-23 ENCOUNTER — TELEPHONE (OUTPATIENT)
Age: 80
End: 2025-05-23

## 2025-06-23 ENCOUNTER — TELEPHONE (OUTPATIENT)
Dept: RADIATION ONCOLOGY | Facility: CLINIC | Age: 80
End: 2025-06-23

## 2025-07-08 ENCOUNTER — TELEPHONE (OUTPATIENT)
Dept: RADIATION ONCOLOGY | Facility: CLINIC | Age: 80
End: 2025-07-08

## 2025-07-08 NOTE — TELEPHONE ENCOUNTER
Pt's wife called noting that [facility]AR never arrived for his 8am CT Scan.  Upon discussion with central scheduling and pt's wife, CT Scan was rescheduled for 7/14/25 at 9:15am at [facility], and [facility] was set up and F/U apt with provider as well as STAR was maintained for 7/17/25 at 9am apt.

## 2025-07-14 ENCOUNTER — HOSPITAL ENCOUNTER (OUTPATIENT)
Dept: RADIOLOGY | Age: 80
Discharge: HOME | End: 2025-07-14
Attending: RADIOLOGY
Payer: COMMERCIAL

## 2025-07-14 DIAGNOSIS — C34.12 MALIGNANT NEOPLASM OF UPPER LOBE OF LEFT LUNG (HCC): ICD-10-CM

## 2025-07-14 PROCEDURE — 71250 CT THORAX DX C-: CPT

## 2025-07-17 ENCOUNTER — OFFICE VISIT (OUTPATIENT)
Dept: RADIATION ONCOLOGY | Facility: CLINIC | Age: 80
End: 2025-07-17
Attending: RADIOLOGY
Payer: COMMERCIAL

## 2025-07-17 VITALS
OXYGEN SATURATION: 99 % | SYSTOLIC BLOOD PRESSURE: 140 MMHG | BODY MASS INDEX: 25.58 KG/M2 | RESPIRATION RATE: 17 BRPM | HEART RATE: 75 BPM | WEIGHT: 168.8 LBS | DIASTOLIC BLOOD PRESSURE: 90 MMHG | HEIGHT: 68 IN | TEMPERATURE: 96.7 F

## 2025-07-17 DIAGNOSIS — C34.12 MALIGNANT NEOPLASM OF UPPER LOBE OF LEFT LUNG (HCC): Primary | ICD-10-CM

## 2025-07-17 PROCEDURE — 99211 OFF/OP EST MAY X REQ PHY/QHP: CPT | Performed by: RADIOLOGY

## 2025-07-17 PROCEDURE — 99213 OFFICE O/P EST LOW 20 MIN: CPT | Performed by: RADIOLOGY

## 2025-07-17 NOTE — ASSESSMENT & PLAN NOTE
Orders:  •  CT chest wo contrast; Future  Magdaleno Tinoco is a 79 y.o. 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  He had a CAT scan of the chest without contrast on September 20, 2022 that revealed a 2 cm spiculated cavitary nodule in the left upper lobe.  He had a PET/CT study November 1, 2022 that confirmed a 2.3 cm hypermetabolic spiculated left upper lobe nodule that could represent malignancy.  There was no evidence of any mediastinal lymphadenopathy.  Left upper lobe lung biopsy December 19, 2022 confirmed adenocarcinoma compatible with lung primary.  He was seen by Dr. Baxter from thoracic surgery on December 28, 2022 and offered surgical resection versus radiation therapy using stereotactic body radiotherapy.  He was reviewed on January 9, 2023 at thoracic oncology multidisciplinary case review.  Recommendations were made for cardiopulmonary exercise stress test that was scheduled for January 13, 2023 and then follow-up with Dr. Baxter to discuss potential surgery.  He notified thoracic surgery on January 17, 2022 he did not wish to pursue surgical resection. He was seen for consultation to pursue definitive radiation therapy using stereotactic body radiotherapy.  We discussed that this can be just as effective as surgery and avoid the risks of surgery. We reviewed his imaging studies including CAT scan of the chest and PET/CT with the patient, his wife and daughter. His left upper lobe lung primary was below the region of his ICD located in the left upper chest.  He received stereotactic body radiotherapy without any dose to his ICD.  He [facility] in [facility] on March 3, 2023.     He is seen for follow-up today.  He is doing well from a respiratory standpoint.  He had a repeat CT of the chest without contrast September 13, 2023 that reveals some posttreatment changes with decrease in central density indicating  further response to treatment which is in a background of radiation changes.  There were some stable tiny left lower lobe nodules and no new nodules.  His chest CT January 11, 2024 revealed no evidence of recurrent disease and no new malignancy in the chest.  There was some mild improvement of the left upper lobe posttreatment changes.  He had a CTA study March 27, 2024 that revealed no pulmonary embolus.  There was some posttreatment changes noted in the left upper lobe region.  His chest CT July 8, 2024 revealed stable posttreatment changes in the left upper lobe without any recurrent disease and there were no other suspicious nor new nodules.  His chest CT performed January 8, 2025 revealed a slight increase in soft tissue in the left upper lobe at the site of his previous radiation fibrosis.  The fibrosis also has a slightly different shape/configuration when I reviewed the images.  This most likely represents progression of radiation fibrosis.  There was a slight increase in groundglass opacity in the peripheral right lower lobe that is inflammatory in nature.  His most recent Chest CT from July 14, 2025 is stable and without any recurrent disease. He is doing well and remains without any evidence of disease.  CT results were discussed with him today.  He will return in 6 months for follow-up with a repeat chest CT without contrast.  He also has follow-up with Dr. Davalos on September 9, 2025.

## 2025-07-17 NOTE — PROGRESS NOTES
Follow-up Visit   Name: Magdaleno Tinoco      : 1945      MRN: [id number]  Encounter Provider: Anthony Gorman MD  Encounter Date: 2025   Encounter department: [facility] RADIATION ONCOLOGY  :  Assessment & Plan  Malignant neoplasm of upper lobe of left lung (HCC)    Orders:  •  CT chest wo contrast; Future  Magdaleno Tinoco is a 79 y.o. 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  He had a CAT scan of the chest without contrast on 2022 that revealed a 2 cm spiculated cavitary nodule in the left upper lobe.  He had a PET/CT study 2022 that confirmed a 2.3 cm hypermetabolic spiculated left upper lobe nodule that could represent malignancy.  There was no evidence of any mediastinal lymphadenopathy.  Left upper lobe lung biopsy 2022 confirmed adenocarcinoma compatible with lung primary.  He was seen by Dr. Baxter from thoracic surgery on 2022 and offered surgical resection versus radiation therapy using stereotactic body radiotherapy.  He was reviewed on 2023 at thoracic oncology multidisciplinary case review.  Recommendations were made for cardiopulmonary exercise stress test that was scheduled for 2023 and then follow-up with Dr. Baxter to discuss potential surgery.  He notified thoracic surgery on 2022 he did not wish to pursue surgical resection. He was seen for consultation to pursue definitive radiation therapy using stereotactic body radiotherapy.  We discussed that this can be just as effective as surgery and avoid the risks of surgery. We reviewed his imaging studies including CAT scan of the chest and PET/CT with the patient, his wife and daughter. His left upper lobe lung primary was below the region of his ICD located in the left upper chest.  He received stereotactic body radiotherapy without any dose to his ICD.  He completed  treatments in the [facility] on March 3, 2023.     He is seen for follow-up today.  He is doing well from a respiratory standpoint.  He had a repeat CT of the chest without contrast September 13, 2023 that reveals some posttreatment changes with decrease in central density indicating further response to treatment which is in a background of radiation changes.  There were some stable tiny left lower lobe nodules and no new nodules.  His chest CT January 11, 2024 revealed no evidence of recurrent disease and no new malignancy in the chest.  There was some mild improvement of the left upper lobe posttreatment changes.  He had a CTA study March 27, 2024 that revealed no pulmonary embolus.  There was some posttreatment changes noted in the left upper lobe region.  His chest CT July 8, 2024 revealed stable posttreatment changes in the left upper lobe without any recurrent disease and there were no other suspicious nor new nodules.  His chest CT performed January 8, 2025 revealed a slight increase in soft tissue in the left upper lobe at the site of his previous radiation fibrosis.  The fibrosis also has a slightly different shape/configuration when I reviewed the images.  This most likely represents progression of radiation fibrosis.  There was a slight increase in groundglass opacity in the peripheral right lower lobe that is inflammatory in nature.  His most recent Chest CT from July 14, 2025 is stable and without any recurrent disease. He is doing well and remains without any evidence of disease.  CT results were discussed with him today.  He will return in 6 months for follow-up with a repeat chest CT without contrast.  He also has follow-up with Dr. Davalos on September 9, 2025.           History of Present Illness   Chief Complaint   Patient presents with   • Follow-up     Pertinent Medical History   Magdaleno Tinoco 1945 is a 79 y.o. male With h/o malignant neoplasm of DEEPALI,stage IA3. He  completed SBRT to the DEEPALI on 3/3/23. He was last seen in Radiation Oncology on 1/16/25 . He returns today for a follow up visit.      7/14/25 CT chest wo contrast  IMPRESSION:  1. Stable left upper lobe post radiation changes without findings concerning for recurrent or metastatic disease.  2. Redemonstrated fusiform ectasia of the ascending thoracic aorta measuring up to 40 mm. Recommendation is for follow-up low radiation dose chest CT in one year.    He is seen for follow-up today alone.  He reports he has stable shortness of breath on exertion and none at rest. He also has a stable mild cough and often clears his throat. He has no sputum production.  He denies any hemoptysis.  He has a good appetite with no dysphagia.  He does not smoke any tobacco since quitting in 2017.  He has a previous history of skin cancers with melanoma and basal cell carcinomas that have been excised which have not recurred.  He denies any new or suspicious skin lesions.     Upcoming appointments:  9/9/25 Dr. Davalos  12/4/025 Cardiology    Oncology History   Cancer Staging   Malignant neoplasm of upper lobe of left lung (HCC)  Staging form: Lung, AJCC 8th Edition  - Clinical stage from 1/24/2023: Stage IA3 (cT1c, cN0, cM0) - Signed by Anthony Gorman MD on 1/24/2023  Histopathologic type: Adenocarcinoma, NOS  Stage prefix: Initial diagnosis  Laterality: Left  Tumor size (mm): 23  Stage used in treatment planning: Yes  National guidelines used in treatment planning: Yes  Oncology History   Malignant neoplasm of upper lobe of left lung (HCC)   12/19/2022 Biopsy    A.  Lung, left upper lobe nodule, biopsy:     - Adenocarcinoma compatible with a lung primary.     12/28/2022 Initial Diagnosis    Malignant neoplasm of upper lobe of left lung (HCC)     1/24/2023 -  Cancer Staged    Staging form: Lung, AJCC 8th Edition  - Clinical stage from 1/24/2023: Stage IA3 (cT1c, cN0, cM0) - Signed by Anthony Gorman MD on  "1/24/2023  Histopathologic type: Adenocarcinoma, NOS  Stage prefix: Initial diagnosis  Laterality: Left  Tumor size (mm): 23  Stage used in treatment planning: Yes  National guidelines used in treatment planning: Yes       2/21/2023 - 3/3/2023 Radiation    Course: C1 SBRT    Plan ID Energy Fractions Dose per Fraction (cGy) Dose Correction (cGy) Total Dose Delivered (cGy) Elapsed Days   SBRT DEEPALI 6X 5 / 5 1,000 0 5,000 10      Treatment dates:  C1 SBRT: 2/21/2023 - 3/3/2023          Surgery          Review of Systems Refer to nursing note.    Medications Ordered Prior to Encounter[1]   Social History[1]      Objective   /90 (BP Location: Left arm)   Pulse 75   Temp (!) 96.7 °F (35.9 °C) (Temporal)   Resp 17   Ht 5' 8\" (1.727 m)   Wt 76.6 kg (168 lb 12.8 oz)   SpO2 99%   BMI 25.67 kg/m²     Pain Screening:  Pain Score:   7  ECOG ECOG Performance Status: 1 - Restricted in physically strenuous activity but ambulatory and able to carry out work of a light or sedentary nature, e.g., light house work, office work  Physical Exam   Vitals and nursing note reviewed.   Constitutional:       General: He is not in acute distress.     Appearance: He is well-developed. He is not diaphoretic.   HENT:      Head: Normocephalic and atraumatic.      Mouth/Throat:      Pharynx: No oropharyngeal exudate.   Eyes:      General: No scleral icterus.     Conjunctiva/sclera: Conjunctivae normal.      Pupils: Pupils are equal, round, and reactive to light.   Neck:      Thyroid: No thyromegaly.      Trachea: No tracheal deviation.   Cardiovascular:      Rate and Rhythm: Normal rate and regular rhythm.      Heart sounds: Normal heart sounds.   Pulmonary:      Effort: Pulmonary effort is normal. No respiratory distress.      Breath sounds: Normal breath sounds. No stridor. No wheezing, rhonchi or rales.   Chest:      Chest wall: No tenderness.   Abdominal:      General: Bowel sounds are normal. There is no distension.      Palpations: " "Abdomen is soft. There is no mass.      Tenderness: There is no abdominal tenderness.   Musculoskeletal:         General: No swelling or tenderness. Normal range of motion.      Cervical back: Normal range of motion and neck supple.   Lymphadenopathy:      Cervical: No cervical adenopathy.      Upper Body:      Right upper body: No supraclavicular adenopathy.      Left upper body: No supraclavicular adenopathy.   Skin:     General: Skin is warm and dry.      Coloration: Skin is not jaundiced or pale.      Findings: No erythema or rash.   Neurological:      General: No focal deficit present.      Mental Status: He is alert and oriented to person, place, and time.      Cranial Nerves: No cranial nerve deficit.      Coordination: Coordination normal.   Psychiatric:         Mood and Affect: Mood normal.         Behavior: Behavior normal.         Thought Content: Thought content normal.         Judgment: Judgment normal.        Administrative Statements   I have spent a total time of 20 minutes in caring for this patient on the day of the visit/encounter including Diagnostic results, Prognosis, Risks and benefits of tx options, Instructions for management, Patient and family education, Importance of tx compliance, Risk factor reductions, Impressions, Counseling / Coordination of care, Documenting in the medical record, Reviewing/placing orders in the medical record (including tests, medications, and/or procedures), Obtaining or reviewing history  , and Communicating with other healthcare professionals .  Portions of the record may have been created with voice recognition software.  Occasional wrong word or \"sound a like\" substitutions may have occurred due to the inherent limitations of voice recognition software.  Read the chart carefully and recognize, using context, where substitutions have occurred.         [1]  Current Outpatient Medications on File Prior to Visit   Medication Sig Dispense Refill   • aspirin 81 MG " tablet Take 81 mg by mouth in the morning.     • Fluticasone Furoate-Vilanterol (Breo Ellipta) 100-25 mcg/actuation inhaler USE 1 INHALATION BY MOUTH DAILY 180 each 1   • metFORMIN (GLUCOPHAGE) 1000 MG tablet TAKE 1 TABLET BY MOUTH TWICE  DAILY WITH MEALS 180 tablet 1   • metoprolol succinate (TOPROL-XL) 25 mg 24 hr tablet Take 1 tablet (25 mg total) by mouth daily 90 tablet 3   • sacubitril-valsartan (Entresto) 49-51 MG TABS Take 1 tablet by mouth in the morning and 1 tablet in the evening.     • simvastatin (ZOCOR) 40 mg tablet Take 1 tablet (40 mg total) by mouth daily at bedtime for 90 days 90 tablet 3   • spironolactone (ALDACTONE) 25 mg tablet Take 0.5 tablets (12.5 mg total) by mouth daily 45 tablet 3   • nitroglycerin (NITROSTAT) 0.4 mg SL tablet  (Patient not taking: Reported on 2023)       No current facility-administered medications on file prior to visit.   [1]  Social History  Tobacco Use   • Smoking status: Former     Current packs/day: 0.00     Average packs/day: 2.0 packs/day for 45.8 years (91.5 ttl pk-yrs)     Types: Cigarettes     Start date:      Quit date: 10/4/2017     Years since quittin.8   • Smokeless tobacco: Never   Vaping Use   • Vaping status: Never Used   Substance and Sexual Activity   • Alcohol use: Not Currently     Alcohol/week: 1.0 standard drink of alcohol     Types: 1 Cans of beer per week     Comment: 1 beer a week   • Drug use: Never   • Sexual activity: Not Currently

## 2025-07-17 NOTE — PROGRESS NOTES
Magdaleno Tinoco 1945 is a 79 y.o. male With h/o malignant neoplasm of DEEPALI,stage IA3. He completed SBRT to the DEEPALI on 3/3/23. He was last seen in Radiation Oncology on 1/16/25 . He returns today for a follow up visit.     7/14/25 CT chest wo contrast  IMPRESSION:  1. Stable left upper lobe post radiation changes without findings concerning for recurrent or metastatic disease.  2. Redemonstrated fusiform ectasia of the ascending thoracic aorta measuring up to 40 mm. Recommendation is for follow-up low radiation dose chest CT in one year.     No upcoming appointments in system    Follow up visit     Oncology History Overview Note   With h/o malignant neoplasm of DEEPALI,stage IA3. He completed SBRT to the DEEPALI on 3/3/23. He was last seen in Radiation Oncology on 1/16/25 . He returns today for a follow up visit.     7/14/25 CT chest wo contrast  IMPRESSION:  1. Stable left upper lobe post radiation changes without findings concerning for recurrent or metastatic disease.  2. Redemonstrated fusiform ectasia of the ascending thoracic aorta measuring up to 40 mm. Recommendation is for follow-up low radiation dose chest CT in one year.     No upcoming appointments in system     Malignant neoplasm of upper lobe of left lung (HCC)   12/19/2022 Biopsy    A.  Lung, left upper lobe nodule, biopsy:     - Adenocarcinoma compatible with a lung primary.     12/28/2022 Initial Diagnosis    Malignant neoplasm of upper lobe of left lung (HCC)     1/24/2023 -  Cancer Staged    Staging form: Lung, AJCC 8th Edition  - Clinical stage from 1/24/2023: Stage IA3 (cT1c, cN0, cM0) - Signed by Anthony Gorman MD on 1/24/2023  Histopathologic type: Adenocarcinoma, NOS  Stage prefix: Initial diagnosis  Laterality: Left  Tumor size (mm): 23  Stage used in treatment planning: Yes  National guidelines used in treatment planning: Yes       2/21/2023 - 3/3/2023 Radiation    Course: C1 SBRT    Plan ID Energy Fractions Dose per Fraction (cGy)  Dose Correction (cGy) Total Dose Delivered (cGy) Elapsed Days   SBRT DEEPALI 6X 5 / 5 1,000 0 5,000 10      Treatment dates:  C1 SBRT: 2/21/2023 - 3/3/2023          Surgery           Review of Systems:  Review of Systems   Constitutional: Negative.    HENT: Negative.     Eyes: Negative.    Respiratory:  Positive for cough, shortness of breath (Report's since pace marker placed, he report's not worsened.) and wheezing (with activity).    Cardiovascular:  Positive for chest pain (chronic, has not worsened since we saw him in january). Negative for palpitations.   Endocrine: Negative.    Genitourinary: Negative.    Musculoskeletal: Negative.    Skin: Negative.    Neurological:  Positive for dizziness (with position changes).   Hematological: Negative.    Psychiatric/Behavioral: Negative.         Clinical Trial: no    Teaching completed     Health Maintenance   Topic Date Due    Hepatitis C Screening  Never done    Depression Follow-up Plan  Never done    Zoster Vaccine (1 of 2) Never done    COVID-19 Vaccine (3 - Pfizer risk series) 09/28/2021    RSV Vaccine for Pregnant Patients and Patients Age 60+ Years (1 - 1-dose 75+ series) Never done    BMI: Followup Plan  06/06/2024    Colonoscopy  12/19/2024    Diabetic Eye Exam  05/27/2025    Fall Risk  06/07/2025    Medicare Annual Wellness Visit (AWV)  06/07/2025    HEMOGLOBIN A1C  06/17/2025    Kidney Health Evaluation: Albumin/Creatinine Ratio  07/29/2025    Influenza Vaccine (1) 09/01/2025    Kidney Health Evaluation: GFR  09/09/2025    Depression Screening  01/16/2026    BMI: Adult  01/16/2026    Diabetic Foot Exam  12/17/2025    Pneumococcal Vaccine: 50+ Years  Completed    Meningococcal B Vaccine  Aged Out    RSV Vaccine age 0-20 Months  Aged Out    HIB Vaccine  Aged Out    IPV Vaccine  Aged Out    Hepatitis A Vaccine  Aged Out    Meningococcal ACWY Vaccine  Aged Out    HPV Vaccine  Aged Out    Lung Cancer Screening  Discontinued     Problem List[1]  Past Medical  History[2]  Past Surgical History[3]  Family History[4]  Social History     Socioeconomic History    Marital status: /Civil Union     Spouse name: Not on file    Number of children: Not on file    Years of education: Not on file    Highest education level: Not on file   Occupational History    Not on file   Tobacco Use    Smoking status: Former     Current packs/day: 0.00     Average packs/day: 2.0 packs/day for 45.8 years (91.5 ttl pk-yrs)     Types: Cigarettes     Start date:      Quit date: 10/4/2017     Years since quittin.7    Smokeless tobacco: Never   Vaping Use    Vaping status: Never Used   Substance and Sexual Activity    Alcohol use: Not Currently     Alcohol/week: 1.0 standard drink of alcohol     Types: 1 Cans of beer per week     Comment: 1 beer a week    Drug use: Never    Sexual activity: Not Currently   Other Topics Concern    Not on file   Social History Narrative    [facility]      Social Drivers of Health     Financial Resource Strain: Low Risk  (2023)    Overall Financial Resource Strain (CARDIA)     Difficulty of Paying Living Expenses: Not hard at all   Food Insecurity: No Food Insecurity (2024)    Nursing - Inadequate Food Risk Classification     Worried About Running Out of Food in the Last Year: Never true     Ran Out of Food in the Last Year: Never true     Ran Out of Food in the Last Year: Not on file   Transportation Needs: No Transportation Needs (2024)    PRAPARE - Transportation     Lack of Transportation (Medical): No     Lack of Transportation (Non-Medical): No   Physical Activity: Not on file   Stress: Not on file   Social Connections: Not on file   Intimate Partner Violence: Not on file   Housing Stability: Low Risk  (2024)    Housing Stability Vital Sign     Unable to Pay for Housing in the Last Year: No     Number of Times Moved in the Last Year: 0     Homeless in the Last Year: No     Current Medications[5]  No Known  "Allergies  There were no vitals filed for this visit.           [1]   Patient Active Problem List  Diagnosis    Cardiomyopathy (HCC)    CHF (congestive heart failure), NYHA class I, chronic, systolic (HCC)    COPD (chronic obstructive pulmonary disease) (HCC)    Hyperlipidemia    Hypertension    Old myocardial infarction    Paroxysmal supraventricular tachycardia (HCC)    Hyperuricemia    Lumbar degenerative disc disease    Type 2 diabetes mellitus with microalbuminuria, without long-term current use of insulin (HCC)    Bilateral renal cysts    Superior mesenteric artery stenosis (HCC)    Celiac artery stenosis (HCC)    Automatic implantable cardiac defibrillator in situ    IVCD (intraventricular conduction defect)    Cigarette nicotine dependence in remission    Malignant neoplasm of upper lobe of left lung (HCC)    Stage 3a chronic kidney disease (HCC)   [2]   Past Medical History:  Diagnosis Date    Angina pectoris (HCC)     \"in the past\"    Basal cell carcinoma     Resolved 8/25/2017     Colitis 11/22/2017    COPD (chronic obstructive pulmonary disease) (HCC)     Diabetes mellitus (HCC)     NIDDM    Hyperlipidemia     Hypertension     Lung cancer (HCC)     Malignant melanoma (HCC) 02/24/2020   [3]   Past Surgical History:  Procedure Laterality Date    CARDIAC SURGERY  2014    cardiac cath    COLONOSCOPY      HAND SURGERY Left     IR BIOPSY LUNG  12/19/2022    KNEE ARTHROSCOPY Right     NECK SURGERY      bone graft and plates    NV EXCISION MALIGNANT LESION F/E/E/N/L >4.0 CM N/A 04/29/2016    Procedure: LEFT CHEEK BCC EXCISION; FROZEN SECTION; BILATERAL LOWER EYELIDS SUSPICIOUS LESION EXCISION; LEFT CHEEK EXTRACTION OF COMEDONES X 2;  Surgeon: Declan Paulino MD;  Location: [facility];  Service: Plastics    NV EXCISION MALIGNANT LESION TRUNK/ARM/LEG > 4.0 CM Left 05/30/2019    Procedure: EXCISION WIDE LESION TRUNK/ABDOMEN/BACK;  Surgeon: Ramon Ayala MD;  Location: [facility];  Service: General   [4]   Family " History  Problem Relation Name Age of Onset    Stomach cancer Mother      Colon cancer Sister     [5]   Current Outpatient Medications:     aspirin 81 MG tablet, Take 81 mg by mouth daily., Disp: , Rfl:     Fluticasone Furoate-Vilanterol (Breo Ellipta) 100-25 mcg/actuation inhaler, USE 1 INHALATION BY MOUTH DAILY, Disp: 180 each, Rfl: 1    metFORMIN (GLUCOPHAGE) 1000 MG tablet, TAKE 1 TABLET BY MOUTH TWICE  DAILY WITH MEALS, Disp: 180 tablet, Rfl: 1    metoprolol succinate (TOPROL-XL) 25 mg 24 hr tablet, Take 1 tablet (25 mg total) by mouth daily, Disp: 90 tablet, Rfl: 3    nitroglycerin (NITROSTAT) 0.4 mg SL tablet, , Disp: , Rfl:     sacubitril-valsartan (Entresto) 49-51 MG TABS, Take 1 tablet by mouth 2 (two) times a day, Disp: , Rfl:     simvastatin (ZOCOR) 40 mg tablet, Take 1 tablet (40 mg total) by mouth daily at bedtime for 90 days, Disp: 90 tablet, Rfl: 3    spironolactone (ALDACTONE) 25 mg tablet, Take 0.5 tablets (12.5 mg total) by mouth daily, Disp: 45 tablet, Rfl: 3

## 2025-07-22 DIAGNOSIS — E11.29 TYPE 2 DIABETES MELLITUS WITH MICROALBUMINURIA, WITHOUT LONG-TERM CURRENT USE OF INSULIN (HCC): ICD-10-CM

## 2025-07-22 DIAGNOSIS — R80.9 TYPE 2 DIABETES MELLITUS WITH MICROALBUMINURIA, WITHOUT LONG-TERM CURRENT USE OF INSULIN (HCC): ICD-10-CM

## (undated) DEVICE — CHLORAPREP HI-LITE 26ML ORANGE

## (undated) DEVICE — DRAPE EQUIPMENT RF WAND

## (undated) DEVICE — PLUMEPEN PRO 10FT

## (undated) DEVICE — BETHLEHEM UNIVERSAL MINOR GEN: Brand: CARDINAL HEALTH

## (undated) DEVICE — VIAL DECANTER

## (undated) DEVICE — GLOVE SRG BIOGEL ECLIPSE 7

## (undated) DEVICE — INTENDED FOR TISSUE SEPARATION, AND OTHER PROCEDURES THAT REQUIRE A SHARP SURGICAL BLADE TO PUNCTURE OR CUT.: Brand: BARD-PARKER SAFETY BLADES SIZE 15, STERILE

## (undated) DEVICE — ADHESIVE SKN CLSR HISTOACRYL FLEX 0.5ML LF

## (undated) DEVICE — LIGHT HANDLE COVER SLEEVE DISP BLUE STELLAR